# Patient Record
Sex: FEMALE | Race: WHITE | NOT HISPANIC OR LATINO
[De-identification: names, ages, dates, MRNs, and addresses within clinical notes are randomized per-mention and may not be internally consistent; named-entity substitution may affect disease eponyms.]

---

## 2018-02-28 ENCOUNTER — APPOINTMENT (OUTPATIENT)
Dept: CARDIOLOGY | Facility: CLINIC | Age: 53
End: 2018-02-28
Payer: COMMERCIAL

## 2018-02-28 ENCOUNTER — NON-APPOINTMENT (OUTPATIENT)
Age: 53
End: 2018-02-28

## 2018-02-28 VITALS
HEIGHT: 63 IN | OXYGEN SATURATION: 94 % | SYSTOLIC BLOOD PRESSURE: 131 MMHG | HEART RATE: 89 BPM | BODY MASS INDEX: 30.3 KG/M2 | WEIGHT: 171 LBS | DIASTOLIC BLOOD PRESSURE: 85 MMHG

## 2018-02-28 DIAGNOSIS — Z82.49 FAMILY HISTORY OF ISCHEMIC HEART DISEASE AND OTHER DISEASES OF THE CIRCULATORY SYSTEM: ICD-10-CM

## 2018-02-28 DIAGNOSIS — Z87.891 PERSONAL HISTORY OF NICOTINE DEPENDENCE: ICD-10-CM

## 2018-02-28 PROCEDURE — 99205 OFFICE O/P NEW HI 60 MIN: CPT

## 2018-02-28 PROCEDURE — 93000 ELECTROCARDIOGRAM COMPLETE: CPT

## 2018-03-19 ENCOUNTER — APPOINTMENT (OUTPATIENT)
Dept: CARDIOLOGY | Facility: CLINIC | Age: 53
End: 2018-03-19
Payer: COMMERCIAL

## 2018-03-19 PROCEDURE — 93306 TTE W/DOPPLER COMPLETE: CPT

## 2018-05-10 ENCOUNTER — APPOINTMENT (OUTPATIENT)
Dept: CARDIOLOGY | Facility: CLINIC | Age: 53
End: 2018-05-10
Payer: COMMERCIAL

## 2018-05-10 PROCEDURE — 93015 CV STRESS TEST SUPVJ I&R: CPT

## 2019-01-25 ENCOUNTER — APPOINTMENT (OUTPATIENT)
Dept: FAMILY MEDICINE | Facility: CLINIC | Age: 54
End: 2019-01-25
Payer: COMMERCIAL

## 2019-01-25 VITALS
DIASTOLIC BLOOD PRESSURE: 90 MMHG | OXYGEN SATURATION: 97 % | SYSTOLIC BLOOD PRESSURE: 140 MMHG | WEIGHT: 163 LBS | RESPIRATION RATE: 16 BRPM | TEMPERATURE: 97.7 F | HEIGHT: 63 IN | BODY MASS INDEX: 28.88 KG/M2 | HEART RATE: 79 BPM

## 2019-01-25 DIAGNOSIS — R07.89 OTHER CHEST PAIN: ICD-10-CM

## 2019-01-25 DIAGNOSIS — R09.89 OTHER SPECIFIED SYMPTOMS AND SIGNS INVOLVING THE CIRCULATORY AND RESPIRATORY SYSTEMS: ICD-10-CM

## 2019-01-25 DIAGNOSIS — Z80.0 FAMILY HISTORY OF MALIGNANT NEOPLASM OF DIGESTIVE ORGANS: ICD-10-CM

## 2019-01-25 DIAGNOSIS — Z82.3 FAMILY HISTORY OF STROKE: ICD-10-CM

## 2019-01-25 DIAGNOSIS — K21.9 GASTRO-ESOPHAGEAL REFLUX DISEASE W/OUT ESOPHAGITIS: ICD-10-CM

## 2019-01-25 DIAGNOSIS — Z80.3 FAMILY HISTORY OF MALIGNANT NEOPLASM OF BREAST: ICD-10-CM

## 2019-01-25 DIAGNOSIS — Z12.31 ENCOUNTER FOR SCREENING MAMMOGRAM FOR MALIGNANT NEOPLASM OF BREAST: ICD-10-CM

## 2019-01-25 PROCEDURE — 99204 OFFICE O/P NEW MOD 45 MIN: CPT

## 2019-01-25 RX ORDER — ASPIRIN 81 MG
81 TABLET, DELAYED RELEASE (ENTERIC COATED) ORAL
Refills: 0 | Status: DISCONTINUED | COMMUNITY
End: 2019-01-25

## 2019-01-27 PROBLEM — R09.89 OTH SYMPTOMS AND SIGNS INVOLVING THE CIRC AND RESP SYSTEMS: Status: RESOLVED | Noted: 2018-05-14 | Resolved: 2019-01-27

## 2019-01-27 PROBLEM — R07.89 CHEST PRESSURE: Status: RESOLVED | Noted: 2018-02-28 | Resolved: 2019-01-27

## 2019-01-27 PROBLEM — Z12.31 OTHER SCREENING MAMMOGRAM: Status: ACTIVE | Noted: 2019-01-25

## 2019-01-27 PROBLEM — K21.9 ACID REFLUX: Status: ACTIVE | Noted: 2019-01-25

## 2019-01-27 LAB — CYTOLOGY CVX/VAG DOC THIN PREP: NORMAL

## 2019-01-27 NOTE — HISTORY OF PRESENT ILLNESS
[FreeTextEntry1] : establish care [de-identified] : Patient is here today to establish care.  She has multiple issues to discuss and has brought some imaging records with her for review.\par \par About one year ago she had lab testing done which showed she had elevated liver function testing.  She states at the time she had hepatitis screening which was negative. She had an abdominal US that showed fatty liver with lesions likely consistent with hemangiomas and liver enlargement.  She had a CT scan done which showed evidence of cirrhosis and portal hypertension with mild spleen enlargement.  These scans were done in April.  She did see a GI doctor but was not happy there and never followed up.  She made an appointment with a new doctor which is in 2 weeks.  She will be seeing Dr. López.  She knows that this is a real problem and that she has been neglecting it.  She doesn't have any symptoms other than abdominal bloating.  She also admits that she does drink daily and is abusing alcohol.  She admits to drinking about 3 drinks per day of rum and coke, but does admit that she makes these drinks stronger than the average drink so likely more like 4-6 drinks daily.  She plans to stop her drinking.  \par \par She also mentions that she has very high cholesterol on her last labs which she is worried about but she can't take statin medication due to her liver function.  \par \par The third thing she wanted to mention was that she also has a very large lipoma on her upper back which bothers her greatly.  She was seeing a plastic surgeon who was willing to remove it but she never went through with having the presurgical testing done because she had too much going on in her life to do the surgery.  She is interested in getting that removed.

## 2019-01-27 NOTE — REVIEW OF SYSTEMS
[Heartburn] : heartburn [Anxiety] : anxiety [Negative] : Neurological [Abdominal Pain] : no abdominal pain [Nausea] : no nausea [Vomiting] : no vomiting [Suicidal] : not suicidal [FreeTextEntry7] : bloating [de-identified] : stress

## 2019-01-27 NOTE — HEALTH RISK ASSESSMENT
[Fair] :  ~his/her~ mood as fair [No falls in past year] : Patient reported no falls in the past year [0] : 1) Little interest or pleasure doing things: Not at all (0) [2] : 2) Feeling down, depressed, or hopeless for more than half of the days (2) [Patient reported mammogram was normal] : Patient reported mammogram was normal [Patient reported PAP Smear was normal] : Patient reported PAP Smear was normal [Patient reported colonoscopy was normal] : Patient reported colonoscopy was normal [HIV Test offered] : HIV Test offered [Hepatitis C test offered] : Hepatitis C test offered [None] : None [With Family] : lives with family [] :  [Sexually Active] : sexually active [Feels Safe at Home] : Feels safe at home [Fully functional (bathing, dressing, toileting, transferring, walking, feeding)] : Fully functional (bathing, dressing, toileting, transferring, walking, feeding) [Fully functional (using the telephone, shopping, preparing meals, housekeeping, doing laundry, using] : Fully functional and needs no help or supervision to perform IADLs (using the telephone, shopping, preparing meals, housekeeping, doing laundry, using transportation, managing medications and managing finances) [Smoke Detector] : smoke detector [Carbon Monoxide Detector] : carbon monoxide detector [Seat Belt] :  uses seat belt [Patient declined discussion] : Patient declined discussion [] : No [de-identified] : 3-5 drinks of rum daily  [[id number]] : 2 [Change in mental status noted] : No change in mental status noted [Language] : denies difficulty with language [Behavior] : denies difficulty with behavior [Reasoning] : denies difficulty with reasoning [High Risk Behavior] : no high risk behavior [Reports changes in hearing] : Reports no changes in hearing [Reports changes in vision] : Reports no changes in vision [Reports changes in dental health] : Reports no changes in dental health [MammogramDate] : 1/2016 [PapSmearDate] : 1/2016 [ColonoscopyDate] : 1/2015

## 2019-01-27 NOTE — ASSESSMENT
[FreeTextEntry1] : Elevated LFTS/ cirrhosis \par - will repeat labs today\par - imaging studies from April reviewed indicating signs of cirrhosis and portal hypertension\par - patient has an appointment for evaluation with GI in 2 weeks\par - discussed at length regarding alcohol use and need to quit\par - patient plans to wean off alcohol starting today\par - follow up in 2-4 weeks\par \par Alcohol abuse\par - patient drinking daily - 4-6 drinks\par - advised patient that she needs to stop drinking \par - alcohol services offered \par - follow up after GI visit \par \par Hyperlipidemia\par - last year cholesterol total of 300\par - unable to start statins due to liver disease\par - will check labs today\par - consider repatha \par \par Acid reflux\par - work on diet modification\par - needs to stop using alcohol\par - antacids as needed\par \par Lipoma of upper back\par - very large lipoma center of upper back just below neck\par - plans to go back to plastic surgeon\par - advised patient that we need to take care of her liver before pursuing any surgeries\par \par Screening\par - EKG and cardiac testing up to date\par - check labs \par - due for pap\par - script given for mammo\par - due for colonoscopy this year, last one 2015\par

## 2019-01-27 NOTE — PHYSICAL EXAM
[No Acute Distress] : no acute distress [Well Nourished] : well nourished [Well Developed] : well developed [PERRL] : pupils equal round and reactive to light [EOMI] : extraocular movements intact [Normal Outer Ear/Nose] : the outer ears and nose were normal in appearance [Normal Oropharynx] : the oropharynx was normal [Normal TMs] : both tympanic membranes were normal [Supple] : supple [No Lymphadenopathy] : no lymphadenopathy [No Respiratory Distress] : no respiratory distress  [Clear to Auscultation] : lungs were clear to auscultation bilaterally [No Accessory Muscle Use] : no accessory muscle use [Normal Rate] : normal rate  [Regular Rhythm] : with a regular rhythm [Normal S1, S2] : normal S1 and S2 [No Murmur] : no murmur heard [Pedal Pulses Present] : the pedal pulses are present [No Edema] : there was no peripheral edema [Soft] : abdomen soft [Non Tender] : non-tender [Non-distended] : non-distended [Normal Bowel Sounds] : normal bowel sounds [Normal Anterior Cervical Nodes] : no anterior cervical lymphadenopathy [No Spinal Tenderness] : no spinal tenderness [Grossly Normal Strength/Tone] : grossly normal strength/tone [No Rash] : no rash [Normal Gait] : normal gait [No Focal Deficits] : no focal deficits [Normal Affect] : the affect was normal [Alert and Oriented x3] : oriented to person, place, and time [Normal Insight/Judgement] : insight and judgment were intact [de-identified] : mild yellowing of sclera  [de-identified] : enlarged liver

## 2019-01-27 NOTE — COUNSELING
[Healthy eating counseling provided] : healthy eating [Activity counseling provided] : activity [Needs reinforcement, provided] : Patient needs reinforcement on understanding lifestyle changes and  the steps needed to achieve self management goals and reinforcement was provided 13-Nov-2018

## 2019-03-01 ENCOUNTER — LABORATORY RESULT (OUTPATIENT)
Age: 54
End: 2019-03-01

## 2019-03-24 LAB
APPEARANCE: ABNORMAL
BACTERIA UR CULT: ABNORMAL
BILIRUBIN URINE: NEGATIVE
BLOOD URINE: NORMAL
COLOR: YELLOW
GLUCOSE QUALITATIVE U: NEGATIVE
KETONES URINE: NEGATIVE
LEUKOCYTE ESTERASE URINE: ABNORMAL
NITRITE URINE: POSITIVE
PH URINE: 6
PROTEIN URINE: ABNORMAL
SPECIFIC GRAVITY URINE: 1.03
UROBILINOGEN URINE: NORMAL

## 2019-03-26 ENCOUNTER — OUTPATIENT (OUTPATIENT)
Dept: OUTPATIENT SERVICES | Facility: HOSPITAL | Age: 54
LOS: 1 days | Discharge: HOME | End: 2019-03-26
Payer: COMMERCIAL

## 2019-03-26 VITALS
DIASTOLIC BLOOD PRESSURE: 74 MMHG | WEIGHT: 167.99 LBS | RESPIRATION RATE: 16 BRPM | SYSTOLIC BLOOD PRESSURE: 145 MMHG | OXYGEN SATURATION: 99 % | HEART RATE: 92 BPM | TEMPERATURE: 99 F | HEIGHT: 63 IN

## 2019-03-26 DIAGNOSIS — K74.60 UNSPECIFIED CIRRHOSIS OF LIVER: ICD-10-CM

## 2019-03-26 DIAGNOSIS — Z98.890 OTHER SPECIFIED POSTPROCEDURAL STATES: Chronic | ICD-10-CM

## 2019-03-26 DIAGNOSIS — D12.2 BENIGN NEOPLASM OF ASCENDING COLON: ICD-10-CM

## 2019-03-26 DIAGNOSIS — K57.30 DIVERTICULOSIS OF LARGE INTESTINE WITHOUT PERFORATION OR ABSCESS WITHOUT BLEEDING: ICD-10-CM

## 2019-03-26 DIAGNOSIS — K29.50 UNSPECIFIED CHRONIC GASTRITIS WITHOUT BLEEDING: ICD-10-CM

## 2019-03-26 DIAGNOSIS — Z12.11 ENCOUNTER FOR SCREENING FOR MALIGNANT NEOPLASM OF COLON: ICD-10-CM

## 2019-03-26 DIAGNOSIS — K64.8 OTHER HEMORRHOIDS: ICD-10-CM

## 2019-03-26 DIAGNOSIS — K21.9 GASTRO-ESOPHAGEAL REFLUX DISEASE WITHOUT ESOPHAGITIS: ICD-10-CM

## 2019-03-26 DIAGNOSIS — Z01.818 ENCOUNTER FOR OTHER PREPROCEDURAL EXAMINATION: ICD-10-CM

## 2019-03-26 LAB
ALBUMIN SERPL ELPH-MCNC: 3.6 G/DL — SIGNIFICANT CHANGE UP (ref 3.3–5)
ALP SERPL-CCNC: 128 U/L — HIGH (ref 40–120)
ALT FLD-CCNC: 57 U/L — SIGNIFICANT CHANGE UP (ref 12–78)
ANION GAP SERPL CALC-SCNC: 11 MMOL/L — SIGNIFICANT CHANGE UP (ref 5–17)
AST SERPL-CCNC: 73 U/L — HIGH (ref 15–37)
BASOPHILS # BLD AUTO: 0.05 K/UL — SIGNIFICANT CHANGE UP (ref 0–0.2)
BASOPHILS NFR BLD AUTO: 0.6 % — SIGNIFICANT CHANGE UP (ref 0–2)
BILIRUB SERPL-MCNC: 1.8 MG/DL — HIGH (ref 0.2–1.2)
BUN SERPL-MCNC: 7 MG/DL — SIGNIFICANT CHANGE UP (ref 7–23)
CALCIUM SERPL-MCNC: 9 MG/DL — SIGNIFICANT CHANGE UP (ref 8.5–10.1)
CHLORIDE SERPL-SCNC: 101 MMOL/L — SIGNIFICANT CHANGE UP (ref 96–108)
CO2 SERPL-SCNC: 28 MMOL/L — SIGNIFICANT CHANGE UP (ref 22–31)
CREAT SERPL-MCNC: 0.86 MG/DL — SIGNIFICANT CHANGE UP (ref 0.5–1.3)
EOSINOPHIL # BLD AUTO: 0.05 K/UL — SIGNIFICANT CHANGE UP (ref 0–0.5)
EOSINOPHIL NFR BLD AUTO: 0.6 % — SIGNIFICANT CHANGE UP (ref 0–6)
GLUCOSE SERPL-MCNC: 122 MG/DL — HIGH (ref 70–99)
HCT VFR BLD CALC: 38.5 % — SIGNIFICANT CHANGE UP (ref 34.5–45)
HGB BLD-MCNC: 13.3 G/DL — SIGNIFICANT CHANGE UP (ref 11.5–15.5)
IMM GRANULOCYTES NFR BLD AUTO: 0.1 % — SIGNIFICANT CHANGE UP (ref 0–1.5)
LYMPHOCYTES # BLD AUTO: 2.09 K/UL — SIGNIFICANT CHANGE UP (ref 1–3.3)
LYMPHOCYTES # BLD AUTO: 25.3 % — SIGNIFICANT CHANGE UP (ref 13–44)
MCHC RBC-ENTMCNC: 34.5 GM/DL — SIGNIFICANT CHANGE UP (ref 32–36)
MCHC RBC-ENTMCNC: 35.7 PG — HIGH (ref 27–34)
MCV RBC AUTO: 103.2 FL — HIGH (ref 80–100)
MONOCYTES # BLD AUTO: 0.63 K/UL — SIGNIFICANT CHANGE UP (ref 0–0.9)
MONOCYTES NFR BLD AUTO: 7.6 % — SIGNIFICANT CHANGE UP (ref 2–14)
NEUTROPHILS # BLD AUTO: 5.42 K/UL — SIGNIFICANT CHANGE UP (ref 1.8–7.4)
NEUTROPHILS NFR BLD AUTO: 65.8 % — SIGNIFICANT CHANGE UP (ref 43–77)
NRBC # BLD: 0 /100 WBCS — SIGNIFICANT CHANGE UP (ref 0–0)
PLATELET # BLD AUTO: 132 K/UL — LOW (ref 150–400)
POTASSIUM SERPL-MCNC: 3.9 MMOL/L — SIGNIFICANT CHANGE UP (ref 3.5–5.3)
POTASSIUM SERPL-SCNC: 3.9 MMOL/L — SIGNIFICANT CHANGE UP (ref 3.5–5.3)
PROT SERPL-MCNC: 8.4 GM/DL — HIGH (ref 6–8.3)
RBC # BLD: 3.73 M/UL — LOW (ref 3.8–5.2)
RBC # FLD: 12.6 % — SIGNIFICANT CHANGE UP (ref 10.3–14.5)
SODIUM SERPL-SCNC: 140 MMOL/L — SIGNIFICANT CHANGE UP (ref 135–145)
WBC # BLD: 8.25 K/UL — SIGNIFICANT CHANGE UP (ref 3.8–10.5)
WBC # FLD AUTO: 8.25 K/UL — SIGNIFICANT CHANGE UP (ref 3.8–10.5)

## 2019-03-26 PROCEDURE — 93010 ELECTROCARDIOGRAM REPORT: CPT

## 2019-03-26 NOTE — H&P PST ADULT - NSICDXPASTMEDICALHX_GEN_ALL_CORE_FT
PAST MEDICAL HISTORY:  Anxiety     Claustrophobia     Constipation     ETOH abuse     Fatty liver     GERD (gastroesophageal reflux disease)     HLD (hyperlipidemia)     IBS (irritable bowel syndrome)     Liver cirrhosis PAST MEDICAL HISTORY:  Anxiety     Claustrophobia     Constipation     ETOH abuse     Fatty liver     GERD (gastroesophageal reflux disease)     HLD (hyperlipidemia)     IBS (irritable bowel syndrome)     Lipoma     Liver cirrhosis

## 2019-03-26 NOTE — H&P PST ADULT - ASSESSMENT
53 year old female presents to [facility] for upper endoscopy and colonoscopy    1.  Dr López  office  will instruct patient regarding bowel prep and  medication regimen on day of procedure.  2. Endoscopy booking will call patient the day before surgery for arrival instructions

## 2019-03-26 NOTE — H&P PST ADULT - NSICDXFAMILYHX_GEN_ALL_CORE_FT
FAMILY HISTORY:  Family history of Parkinsonism, father  Family history of stroke, father  Family hx of colon cancer, father  FH: CAD (coronary artery disease), father  FHx: breast cancer, mother

## 2019-03-26 NOTE — H&P PST ADULT - HISTORY OF PRESENT ILLNESS
53 year old female with GERD, liver cirrhosis and family history of colon cancer c/o bloating after meals occasional vomiting ; she presents to [facility] for upper endoscopy and routine colonoscopy

## 2019-04-03 RX ORDER — SULFAMETHOXAZOLE AND TRIMETHOPRIM 800; 160 MG/1; MG/1
800-160 TABLET ORAL TWICE DAILY
Qty: 6 | Refills: 0 | Status: COMPLETED | COMMUNITY
Start: 2019-03-11 | End: 2019-04-03

## 2019-04-04 VITALS — HEIGHT: 63 IN | WEIGHT: 169.98 LBS

## 2019-04-05 ENCOUNTER — OUTPATIENT (OUTPATIENT)
Dept: OUTPATIENT SERVICES | Facility: HOSPITAL | Age: 54
LOS: 1 days | Discharge: HOME | End: 2019-04-05
Payer: COMMERCIAL

## 2019-04-05 ENCOUNTER — RESULT REVIEW (OUTPATIENT)
Age: 54
End: 2019-04-05

## 2019-04-05 VITALS
RESPIRATION RATE: 18 BRPM | TEMPERATURE: 99 F | HEIGHT: 63 IN | OXYGEN SATURATION: 97 % | SYSTOLIC BLOOD PRESSURE: 143 MMHG | HEART RATE: 78 BPM | DIASTOLIC BLOOD PRESSURE: 87 MMHG | WEIGHT: 166.89 LBS

## 2019-04-05 DIAGNOSIS — Z98.890 OTHER SPECIFIED POSTPROCEDURAL STATES: Chronic | ICD-10-CM

## 2019-04-05 PROCEDURE — 88312 SPECIAL STAINS GROUP 1: CPT | Mod: 26

## 2019-04-05 PROCEDURE — 88305 TISSUE EXAM BY PATHOLOGIST: CPT | Mod: 26

## 2019-04-05 NOTE — ASU PATIENT PROFILE, ADULT - PMH
Anxiety    Claustrophobia    Constipation    ETOH abuse    Fatty liver    GERD (gastroesophageal reflux disease)    HLD (hyperlipidemia)    IBS (irritable bowel syndrome)    Lipoma    Liver cirrhosis

## 2019-04-09 LAB — SURGICAL PATHOLOGY STUDY: SIGNIFICANT CHANGE UP

## 2019-04-11 DIAGNOSIS — Z80.0 FAMILY HISTORY OF MALIGNANT NEOPLASM OF DIGESTIVE ORGANS: ICD-10-CM

## 2019-04-11 DIAGNOSIS — E78.5 HYPERLIPIDEMIA, UNSPECIFIED: ICD-10-CM

## 2019-04-11 DIAGNOSIS — K57.30 DIVERTICULOSIS OF LARGE INTESTINE WITHOUT PERFORATION OR ABSCESS WITHOUT BLEEDING: ICD-10-CM

## 2019-04-11 DIAGNOSIS — K74.60 UNSPECIFIED CIRRHOSIS OF LIVER: ICD-10-CM

## 2019-04-11 DIAGNOSIS — D12.2 BENIGN NEOPLASM OF ASCENDING COLON: ICD-10-CM

## 2019-04-11 DIAGNOSIS — K64.8 OTHER HEMORRHOIDS: ICD-10-CM

## 2019-04-11 DIAGNOSIS — K58.9 IRRITABLE BOWEL SYNDROME WITHOUT DIARRHEA: ICD-10-CM

## 2019-04-11 DIAGNOSIS — K21.9 GASTRO-ESOPHAGEAL REFLUX DISEASE WITHOUT ESOPHAGITIS: ICD-10-CM

## 2019-04-11 DIAGNOSIS — K29.50 UNSPECIFIED CHRONIC GASTRITIS WITHOUT BLEEDING: ICD-10-CM

## 2019-04-11 DIAGNOSIS — Z12.11 ENCOUNTER FOR SCREENING FOR MALIGNANT NEOPLASM OF COLON: ICD-10-CM

## 2019-08-05 ENCOUNTER — APPOINTMENT (OUTPATIENT)
Dept: FAMILY MEDICINE | Facility: CLINIC | Age: 54
End: 2019-08-05
Payer: COMMERCIAL

## 2019-08-05 VITALS
BODY MASS INDEX: 29.59 KG/M2 | HEART RATE: 94 BPM | SYSTOLIC BLOOD PRESSURE: 140 MMHG | RESPIRATION RATE: 16 BRPM | DIASTOLIC BLOOD PRESSURE: 90 MMHG | OXYGEN SATURATION: 97 % | HEIGHT: 63 IN | WEIGHT: 167 LBS

## 2019-08-05 DIAGNOSIS — N95.0 POSTMENOPAUSAL BLEEDING: ICD-10-CM

## 2019-08-05 DIAGNOSIS — N39.0 URINARY TRACT INFECTION, SITE NOT SPECIFIED: ICD-10-CM

## 2019-08-05 DIAGNOSIS — I10 ESSENTIAL (PRIMARY) HYPERTENSION: ICD-10-CM

## 2019-08-05 PROBLEM — K58.9 IRRITABLE BOWEL SYNDROME WITHOUT DIARRHEA: Chronic | Status: ACTIVE | Noted: 2019-03-26

## 2019-08-05 PROBLEM — K76.0 FATTY (CHANGE OF) LIVER, NOT ELSEWHERE CLASSIFIED: Chronic | Status: ACTIVE | Noted: 2019-03-26

## 2019-08-05 PROBLEM — F10.10 ALCOHOL ABUSE, UNCOMPLICATED: Chronic | Status: ACTIVE | Noted: 2019-03-26

## 2019-08-05 PROBLEM — F40.240 CLAUSTROPHOBIA: Chronic | Status: ACTIVE | Noted: 2019-03-26

## 2019-08-05 PROBLEM — F41.9 ANXIETY DISORDER, UNSPECIFIED: Chronic | Status: ACTIVE | Noted: 2019-03-26

## 2019-08-05 PROBLEM — K59.00 CONSTIPATION, UNSPECIFIED: Chronic | Status: ACTIVE | Noted: 2019-03-26

## 2019-08-05 PROBLEM — D17.9 BENIGN LIPOMATOUS NEOPLASM, UNSPECIFIED: Chronic | Status: ACTIVE | Noted: 2019-03-26

## 2019-08-05 PROBLEM — K21.9 GASTRO-ESOPHAGEAL REFLUX DISEASE WITHOUT ESOPHAGITIS: Chronic | Status: ACTIVE | Noted: 2019-03-26

## 2019-08-05 PROBLEM — K74.60 UNSPECIFIED CIRRHOSIS OF LIVER: Chronic | Status: ACTIVE | Noted: 2019-03-26

## 2019-08-05 PROBLEM — E78.5 HYPERLIPIDEMIA, UNSPECIFIED: Chronic | Status: ACTIVE | Noted: 2019-03-26

## 2019-08-05 PROCEDURE — 99214 OFFICE O/P EST MOD 30 MIN: CPT

## 2019-08-05 RX ORDER — CIPROFLOXACIN HYDROCHLORIDE 500 MG/1
500 TABLET, FILM COATED ORAL
Qty: 3 | Refills: 0 | Status: DISCONTINUED | COMMUNITY
Start: 2019-04-03 | End: 2019-08-05

## 2019-08-05 RX ORDER — NITROFURANTOIN (MONOHYDRATE/MACROCRYSTALS) 25; 75 MG/1; MG/1
100 CAPSULE ORAL
Qty: 14 | Refills: 0 | Status: COMPLETED | COMMUNITY
Start: 2019-05-06

## 2019-08-05 RX ORDER — ERGOCALCIFEROL 1.25 MG/1
1.25 MG CAPSULE, LIQUID FILLED ORAL
Qty: 12 | Refills: 1 | Status: COMPLETED | COMMUNITY
Start: 2019-03-01 | End: 2019-08-05

## 2019-08-05 RX ORDER — ATORVASTATIN CALCIUM 20 MG/1
20 TABLET, FILM COATED ORAL
Qty: 90 | Refills: 0 | Status: COMPLETED | COMMUNITY
Start: 2019-03-01 | End: 2019-08-05

## 2019-08-05 RX ORDER — SODIUM SULFATE, POTASSIUM SULFATE, MAGNESIUM SULFATE 17.5; 3.13; 1.6 G/ML; G/ML; G/ML
17.5-3.13-1.6 SOLUTION, CONCENTRATE ORAL
Qty: 354 | Refills: 0 | Status: COMPLETED | COMMUNITY
Start: 2019-02-14

## 2019-08-05 RX ORDER — ALPRAZOLAM 0.25 MG/1
0.25 TABLET ORAL
Qty: 15 | Refills: 0 | Status: COMPLETED | COMMUNITY
Start: 2019-02-28

## 2019-08-05 RX ORDER — POLYETHYLENE GLYCOL-3350, SODIUM CHLORIDE, POTASSIUM CHLORIDE AND SODIUM BICARBONATE 420; 11.2; 5.72; 1.48 G/438.4G; G/438.4G; G/438.4G; G/438.4G
420 POWDER, FOR SOLUTION ORAL
Qty: 4000 | Refills: 0 | Status: COMPLETED | COMMUNITY
Start: 2019-02-22 | End: 2019-08-05

## 2019-08-05 RX ORDER — SODIUM FLUORIDE 0.1 MG/ML
RINSE ORAL
Refills: 0 | Status: ACTIVE | COMMUNITY

## 2019-08-05 RX ORDER — ALPRAZOLAM 0.5 MG/1
0.5 TABLET ORAL
Qty: 1 | Refills: 0 | Status: COMPLETED | COMMUNITY
Start: 2019-02-18

## 2019-08-05 NOTE — REVIEW OF SYSTEMS
[Abdominal Pain] : abdominal pain [Negative] : Integumentary [FreeTextEntry7] : pain around belly button, hardened area/ protrusion [FreeTextEntry8] : vaginal bleeding

## 2019-08-05 NOTE — ASSESSMENT
[FreeTextEntry1] : Periumbilical pain\par - check abdominal US\par - rule out hernia\par - follow up with GI\par \par Postmenopausal bleeding\par - follow up with GYN\par - has script for US and has not gone\par - advised she should go right away for transvaginal US\par - will likely need endometrial biopsy\par \par Hypertension\par - BP elevated\par - start HCTZ\par - follow up in 3 months\par \par Hyperlipidemia\par - stopped taking statin\par - will restart on simvastatin as patient said she did not feel well when taking atorvastatin\par - check labs in 3 months\par \par

## 2019-08-05 NOTE — HISTORY OF PRESENT ILLNESS
[FreeTextEntry8] : Patient is here today for an acute visit. \par She has been having pain in her upper abdomen, intermittently for a long time. \par She has been having nausea and bloating. \par She feels like there is a protrusion around her belly button that started the weekend. \par When she was touching the area if felt hard underneath. \par She is not sure if it might be a hernia.  \par \par She also mentions that she has been having some vaginal bleeding over the past month. \par She has not talked to her GYN regarding this situation yet.  \par She did have a script for an internal sonogram but has not gone yet.

## 2019-08-05 NOTE — PHYSICAL EXAM
[No Acute Distress] : no acute distress [Well Nourished] : well nourished [Well Developed] : well developed [PERRL] : pupils equal round and reactive to light [Normal Sclera/Conjunctiva] : normal sclera/conjunctiva [Normal Outer Ear/Nose] : the outer ears and nose were normal in appearance [Normal Oropharynx] : the oropharynx was normal [Normal TMs] : both tympanic membranes were normal [No Lymphadenopathy] : no lymphadenopathy [Supple] : supple [No Respiratory Distress] : no respiratory distress  [No Accessory Muscle Use] : no accessory muscle use [Clear to Auscultation] : lungs were clear to auscultation bilaterally [Normal Rate] : normal rate  [Regular Rhythm] : with a regular rhythm [Normal S1, S2] : normal S1 and S2 [No Edema] : there was no peripheral edema [Non-distended] : non-distended [Soft] : abdomen soft [Normal Bowel Sounds] : normal bowel sounds [Normal Posterior Cervical Nodes] : no posterior cervical lymphadenopathy [Normal Anterior Cervical Nodes] : no anterior cervical lymphadenopathy [No Spinal Tenderness] : no spinal tenderness [Grossly Normal Strength/Tone] : grossly normal strength/tone [No Rash] : no rash [No Focal Deficits] : no focal deficits [Normal Gait] : normal gait [Normal Affect] : the affect was normal [Normal Insight/Judgement] : insight and judgment were intact [de-identified] : hardened area around belly button, pain with palpation

## 2019-08-12 ENCOUNTER — CLINICAL ADVICE (OUTPATIENT)
Age: 54
End: 2019-08-12

## 2019-10-07 ENCOUNTER — APPOINTMENT (OUTPATIENT)
Dept: SURGERY | Facility: CLINIC | Age: 54
End: 2019-10-07
Payer: COMMERCIAL

## 2019-10-07 VITALS
DIASTOLIC BLOOD PRESSURE: 76 MMHG | WEIGHT: 169 LBS | BODY MASS INDEX: 29.95 KG/M2 | TEMPERATURE: 98.3 F | OXYGEN SATURATION: 95 % | SYSTOLIC BLOOD PRESSURE: 116 MMHG | HEIGHT: 63 IN | HEART RATE: 99 BPM

## 2019-10-07 DIAGNOSIS — E65 LOCALIZED ADIPOSITY: ICD-10-CM

## 2019-10-07 DIAGNOSIS — L72.0 EPIDERMAL CYST: ICD-10-CM

## 2019-10-07 PROCEDURE — 99204 OFFICE O/P NEW MOD 45 MIN: CPT

## 2019-10-07 NOTE — PHYSICAL EXAM
[JVD] : no jugular venous distention  [No Rash or Lesion] : No rash or lesion [Purpura] : no purpura  [Petechiae] : no petechiae [Skin Ulcer] : no ulcer [Skin Induration] : no induration [Alert] : alert [Oriented to Person] : oriented to person [Oriented to Place] : oriented to place [Oriented to Time] : oriented to time [Anxious] : anxious [de-identified] : non toxic, in no acute distress  [de-identified] : NC/AT PERRL EOMI no scleral icterus  [de-identified] : trachea midline, no gross mass  [de-identified] : no audible wheezing or stridor  [de-identified] : obese soft, non tender, no guarding, no rebound, no masses  [de-identified] : FROM of all extremities with no gross deformity or angulation, there is no abdominal wall hernias  [de-identified] : there is a large buffalo hump of the upper back, no clear lipoma is palpated, there is  a small cyst palpable above the umbilicus as seen on ultrasound [de-identified] : mood is mildly anxious

## 2019-10-07 NOTE — ASSESSMENT
[FreeTextEntry1] : The patient is a 54 year old female with a history of alcoholic cirrhosis who comes with a buffalo hump of the upper back and a cyst of the anterior abdominal wall. She has been advised that there is no clear discernible lipoma of the upper back, it feels more compatible with lipodystrophy of the upper back.  I have recommended she seek consultation with a plastic surgeon for possible liposuction as this may afford her a better result with a least invasive approach.  Regarding the anterior abdominal wall cyst we will take a conservative approach. She will follow up as needed from this point forward.

## 2019-10-07 NOTE — DATA REVIEWED
[FreeTextEntry1] : Independent review of the ultrasound reveals a 7 mm fluid filled cyst in the periumbilical area.  There is cirrhotic change to the liver parenchyma

## 2019-10-07 NOTE — CONSULT LETTER
[Dear  ___] : Dear  [unfilled], [Consult Letter:] : I had the pleasure of evaluating your patient, [unfilled]. [( Thank you for referring [unfilled] for consultation for _____ )] : Thank you for referring [unfilled] for consultation for [unfilled] [Please see my note below.] : Please see my note below. [Consult Closing:] : Thank you very much for allowing me to participate in the care of this patient.  If you have any questions, please do not hesitate to contact me. [Sincerely,] : Sincerely, [FreeTextEntry3] : Chalino Ellis MD, FACS\par  of Surgery\par [facility]\par

## 2019-10-07 NOTE — HISTORY OF PRESENT ILLNESS
[de-identified] : The patient comes to the office in consultation by Dr. Yamilet Sutton for evaluation of large lump of the upper back and a smaller cyst of the abdominal wall.  The patient is quite distressed by the presence of a large fatty lump of the upper back and lower neck.  She reports that she has been experiencing upper back pain that she attributes to the presence of the mass. She states has seen a surgeon in the past that recommended liposuction, but she declined at that time.  The patient also reports that she has a smaller lump near her belly button that on imaging is consistent with a cyst.

## 2019-11-05 ENCOUNTER — APPOINTMENT (OUTPATIENT)
Dept: PLASTIC SURGERY | Facility: CLINIC | Age: 54
End: 2019-11-05

## 2019-11-06 ENCOUNTER — RX RENEWAL (OUTPATIENT)
Age: 54
End: 2019-11-06

## 2019-11-20 ENCOUNTER — MEDICATION RENEWAL (OUTPATIENT)
Age: 54
End: 2019-11-20

## 2019-11-26 ENCOUNTER — APPOINTMENT (OUTPATIENT)
Dept: FAMILY MEDICINE | Facility: CLINIC | Age: 54
End: 2019-11-26
Payer: COMMERCIAL

## 2019-11-26 VITALS
WEIGHT: 172 LBS | DIASTOLIC BLOOD PRESSURE: 70 MMHG | HEIGHT: 63 IN | RESPIRATION RATE: 16 BRPM | HEART RATE: 90 BPM | BODY MASS INDEX: 30.48 KG/M2 | SYSTOLIC BLOOD PRESSURE: 140 MMHG | OXYGEN SATURATION: 97 %

## 2019-11-26 LAB — CYTOLOGY CVX/VAG DOC THIN PREP: NORMAL

## 2019-11-26 PROCEDURE — 99214 OFFICE O/P EST MOD 30 MIN: CPT | Mod: 25

## 2019-11-26 PROCEDURE — 36415 COLL VENOUS BLD VENIPUNCTURE: CPT

## 2019-11-27 LAB — POTASSIUM SERPL-SCNC: 5.1 MMOL/L

## 2019-11-27 RX ORDER — POTASSIUM CHLORIDE 600 MG/1
8 CAPSULE, EXTENDED RELEASE ORAL
Qty: 90 | Refills: 0 | Status: COMPLETED | COMMUNITY
Start: 2019-11-20 | End: 2019-11-27

## 2019-11-27 NOTE — ASSESSMENT
[As per surgery] : as per surgery [Patient Optimized for Surgery] : Patient optimized for surgery [FreeTextEntry4] : Patient is here today for clearance for lipoma removal.\par Presurgical labs - LFTS stable, patient has cirrhosis, Critically low potassium - taking supplement and repeat shows normal at 5.1\par EKG - sinus rhythm, no acute changes from previous, had recent stress and ECHO in 2018 and cleared by cardiology\par No contraindication to planned procedure

## 2019-11-27 NOTE — PHYSICAL EXAM
[Well Nourished] : well nourished [Well Developed] : well developed [No Acute Distress] : no acute distress [PERRL] : pupils equal round and reactive to light [Normal Oropharynx] : the oropharynx was normal [Normal Sclera/Conjunctiva] : normal sclera/conjunctiva [No Lymphadenopathy] : no lymphadenopathy [Supple] : supple [Normal TMs] : both tympanic membranes were normal [Clear to Auscultation] : lungs were clear to auscultation bilaterally [No Accessory Muscle Use] : no accessory muscle use [No Respiratory Distress] : no respiratory distress  [Normal Rate] : normal rate  [Regular Rhythm] : with a regular rhythm [Normal S1, S2] : normal S1 and S2 [No Edema] : there was no peripheral edema [Soft] : abdomen soft [Non Tender] : non-tender [Normal Bowel Sounds] : normal bowel sounds [Non-distended] : non-distended [Normal Anterior Cervical Nodes] : no anterior cervical lymphadenopathy [Normal Posterior Cervical Nodes] : no posterior cervical lymphadenopathy [Grossly Normal Strength/Tone] : grossly normal strength/tone [No Spinal Tenderness] : no spinal tenderness [No Rash] : no rash [No Focal Deficits] : no focal deficits [Normal Gait] : normal gait [Normal Insight/Judgement] : insight and judgment were intact [Normal Affect] : the affect was normal [de-identified] : large lipoma

## 2019-11-27 NOTE — PLAN
[FreeTextEntry1] : Hypokalemia\par - took potassium supplement\par - repeat labs - \par \par Cirrhosis\par - LFTs stable\par - following with GI\par - has follow up in December\par \par Anxiety\par - abut procedure\par - small amount of xanax prescribed to use as needed prior to and after procedure

## 2019-11-27 NOTE — HISTORY OF PRESENT ILLNESS
[No Pertinent Cardiac History] : no history of aortic stenosis, atrial fibrillation, coronary artery disease, recent myocardial infarction, or implantable device/pacemaker [No Adverse Anesthesia Reaction] : no adverse anesthesia reaction in self or family member [Good (7-10 METs)] : Good (7-10 METs) [(Patient denies any chest pain, claudication, dyspnea on exertion, orthopnea, palpitations or syncope)] : Patient denies any chest pain, claudication, dyspnea on exertion, orthopnea, palpitations or syncope [Aortic Stenosis] : no aortic stenosis [Atrial Fibrillation] : no atrial fibrillation [Coronary Artery Disease] : no coronary artery disease [Recent Myocardial Infarction] : no recent myocardial infarction [Implantable Device/Pacemaker] : no implantable device/pacemaker [Asthma] : no asthma [COPD] : no COPD [Sleep Apnea] : no sleep apnea [Smoker] : not a smoker [Chronic Anticoagulation] : no chronic anticoagulation [Chronic Kidney Disease] : no chronic kidney disease [Diabetes] : no diabetes [FreeTextEntry1] : lipoma removal  [FreeTextEntry2] : 12/4/2019 [FreeTextEntry3] : Dr. Belkys Buenrostro [FreeTextEntry4] : She will be having lipoma removal at [facility]

## 2019-12-16 ENCOUNTER — RX RENEWAL (OUTPATIENT)
Age: 54
End: 2019-12-16

## 2019-12-19 ENCOUNTER — APPOINTMENT (OUTPATIENT)
Dept: FAMILY MEDICINE | Facility: CLINIC | Age: 54
End: 2019-12-19
Payer: COMMERCIAL

## 2019-12-19 VITALS
RESPIRATION RATE: 15 BRPM | WEIGHT: 162 LBS | BODY MASS INDEX: 28.7 KG/M2 | HEART RATE: 92 BPM | DIASTOLIC BLOOD PRESSURE: 76 MMHG | HEIGHT: 63 IN | OXYGEN SATURATION: 98 % | SYSTOLIC BLOOD PRESSURE: 120 MMHG

## 2019-12-19 LAB
FLUAV SPEC QL CULT: NEGATIVE
FLUBV AG SPEC QL IA: NEGATIVE

## 2019-12-19 PROCEDURE — 99214 OFFICE O/P EST MOD 30 MIN: CPT | Mod: 25

## 2019-12-19 PROCEDURE — 87804 INFLUENZA ASSAY W/OPTIC: CPT | Mod: QW

## 2019-12-19 NOTE — PHYSICAL EXAM
[No Acute Distress] : no acute distress [Well Nourished] : well nourished [Well Developed] : well developed [Normal Sclera/Conjunctiva] : normal sclera/conjunctiva [PERRL] : pupils equal round and reactive to light [Normal Outer Ear/Nose] : the outer ears and nose were normal in appearance [Normal Oropharynx] : the oropharynx was normal [Normal TMs] : both tympanic membranes were normal [No Lymphadenopathy] : no lymphadenopathy [No Respiratory Distress] : no respiratory distress  [Clear to Auscultation] : lungs were clear to auscultation bilaterally [No Accessory Muscle Use] : no accessory muscle use [Normal Rate] : normal rate  [Normal S1, S2] : normal S1 and S2 [Regular Rhythm] : with a regular rhythm [Soft] : abdomen soft [Non-distended] : non-distended [Normal Bowel Sounds] : normal bowel sounds [Grossly Normal Strength/Tone] : grossly normal strength/tone [No Rash] : no rash [Normal Gait] : normal gait [No Focal Deficits] : no focal deficits [Normal Insight/Judgement] : insight and judgment were intact [Normal Affect] : the affect was normal [de-identified] : diffuse tenderness

## 2019-12-19 NOTE — HISTORY OF PRESENT ILLNESS
[FreeTextEntry8] : Patient is here today for an acute visit. \par She has been feeling unwell for the past 4-5 days. \par She has shakes, muscle pain. \par She has no appetite. \par She has nausea and diarrhea.  She notices her stool is very dark color, floating.  She has diarrhea constantly and today is the first day that she is starting to turn the corner. \par She tried immodium once but did not take it again. \par She does not have fevers. \par She can't sleep and she has palpitations. \par \par

## 2019-12-19 NOTE — ASSESSMENT
[FreeTextEntry1] : Viral illness\par Nausea\par Diarrhea\par - flu negative\par - start zofran for nausea\par - increase fluids\par - BRAT diet\par - call GI regarding dark stools - has follow up 1/6\par - if not feeling better in next 3-5 days call office, consider stool studies\par - AVOID ALCOHOL\par \par Anxiety\par - continue with xanax as needed\par - will consider starting maintenance medication once viral illness resolved

## 2019-12-19 NOTE — REVIEW OF SYSTEMS
[Abdominal Pain] : abdominal pain [Nausea] : nausea [Diarrhea] : diarrhea [Melena] : mellolita [Muscle Pain] : muscle pain [Negative] : Integumentary [Anxiety] : anxiety

## 2019-12-23 ENCOUNTER — RX RENEWAL (OUTPATIENT)
Age: 54
End: 2019-12-23

## 2020-01-01 ENCOUNTER — OUTPATIENT (OUTPATIENT)
Dept: OUTPATIENT SERVICES | Facility: HOSPITAL | Age: 55
LOS: 1 days | End: 2020-01-01
Payer: COMMERCIAL

## 2020-01-01 ENCOUNTER — APPOINTMENT (OUTPATIENT)
Dept: FAMILY MEDICINE | Facility: CLINIC | Age: 55
End: 2020-01-01
Payer: COMMERCIAL

## 2020-01-01 ENCOUNTER — RX RENEWAL (OUTPATIENT)
Age: 55
End: 2020-01-01

## 2020-01-01 ENCOUNTER — RESULT REVIEW (OUTPATIENT)
Age: 55
End: 2020-01-01

## 2020-01-01 ENCOUNTER — OUTPATIENT (OUTPATIENT)
Dept: INPATIENT UNIT | Facility: HOSPITAL | Age: 55
LOS: 1 days | Discharge: HOME | End: 2020-01-01
Payer: COMMERCIAL

## 2020-01-01 VITALS
SYSTOLIC BLOOD PRESSURE: 126 MMHG | BODY MASS INDEX: 31.01 KG/M2 | WEIGHT: 175 LBS | DIASTOLIC BLOOD PRESSURE: 72 MMHG | TEMPERATURE: 97.4 F | HEIGHT: 63 IN | RESPIRATION RATE: 16 BRPM | HEART RATE: 79 BPM | OXYGEN SATURATION: 97 %

## 2020-01-01 VITALS
RESPIRATION RATE: 18 BRPM | SYSTOLIC BLOOD PRESSURE: 114 MMHG | DIASTOLIC BLOOD PRESSURE: 57 MMHG | OXYGEN SATURATION: 99 % | HEART RATE: 79 BPM | TEMPERATURE: 98 F

## 2020-01-01 VITALS
WEIGHT: 169.09 LBS | OXYGEN SATURATION: 100 % | RESPIRATION RATE: 18 BRPM | HEART RATE: 72 BPM | SYSTOLIC BLOOD PRESSURE: 117 MMHG | DIASTOLIC BLOOD PRESSURE: 62 MMHG | HEIGHT: 63 IN | TEMPERATURE: 98 F

## 2020-01-01 DIAGNOSIS — I10 ESSENTIAL (PRIMARY) HYPERTENSION: ICD-10-CM

## 2020-01-01 DIAGNOSIS — K70.31 ALCOHOLIC CIRRHOSIS OF LIVER WITH ASCITES: ICD-10-CM

## 2020-01-01 DIAGNOSIS — F10.10 ALCOHOL ABUSE, UNCOMPLICATED: ICD-10-CM

## 2020-01-01 DIAGNOSIS — D17.1 BENIGN LIPOMATOUS NEOPLASM OF SKIN AND SUBCUTANEOUS TISSUE OF TRUNK: Chronic | ICD-10-CM

## 2020-01-01 DIAGNOSIS — Z11.59 ENCOUNTER FOR SCREENING FOR OTHER VIRAL DISEASES: ICD-10-CM

## 2020-01-01 DIAGNOSIS — F10.21 ALCOHOL DEPENDENCE, IN REMISSION: ICD-10-CM

## 2020-01-01 DIAGNOSIS — Z98.890 OTHER SPECIFIED POSTPROCEDURAL STATES: Chronic | ICD-10-CM

## 2020-01-01 DIAGNOSIS — K21.9 GASTRO-ESOPHAGEAL REFLUX DISEASE WITHOUT ESOPHAGITIS: ICD-10-CM

## 2020-01-01 DIAGNOSIS — F41.9 ANXIETY DISORDER, UNSPECIFIED: ICD-10-CM

## 2020-01-01 DIAGNOSIS — D64.9 ANEMIA, UNSPECIFIED: ICD-10-CM

## 2020-01-01 DIAGNOSIS — E78.5 HYPERLIPIDEMIA, UNSPECIFIED: ICD-10-CM

## 2020-01-01 DIAGNOSIS — Z79.1 LONG TERM (CURRENT) USE OF NON-STEROIDAL ANTI-INFLAMMATORIES (NSAID): ICD-10-CM

## 2020-01-01 LAB
ALBUMIN FLD-MCNC: 1.1 G/DL — SIGNIFICANT CHANGE UP
B PERT IGG+IGM PNL SER: ABNORMAL
COLOR FLD: YELLOW — SIGNIFICANT CHANGE UP
CULTURE RESULTS: SIGNIFICANT CHANGE UP
CULTURE RESULTS: SIGNIFICANT CHANGE UP
EOSINOPHIL # FLD: 0 % — SIGNIFICANT CHANGE UP
FLUID INTAKE SUBSTANCE CLASS: SIGNIFICANT CHANGE UP
FLUID SEGMENTED GRANULOCYTES: 3 % — SIGNIFICANT CHANGE UP
FOLATE+VIT B12 SERBLD-IMP: 0 % — SIGNIFICANT CHANGE UP
GLUCOSE FLD-MCNC: 117 MG/DL — SIGNIFICANT CHANGE UP
GRAM STN FLD: SIGNIFICANT CHANGE UP
LDH SERPL L TO P-CCNC: 54 U/L — SIGNIFICANT CHANGE UP
LYMPHOCYTES # FLD: 11 % — SIGNIFICANT CHANGE UP
MESOTHL CELL # FLD: 4 % — SIGNIFICANT CHANGE UP
MONOS+MACROS # FLD: 82 % — SIGNIFICANT CHANGE UP
NRBC # FLD: 0 — SIGNIFICANT CHANGE UP
OTHER CELLS FLD MANUAL: 0 % — SIGNIFICANT CHANGE UP
PROT FLD-MCNC: 2.5 G/DL — SIGNIFICANT CHANGE UP
RCV VOL RI: 1000 /UL — HIGH (ref 0–0)
SARS-COV-2 RNA SPEC QL NAA+PROBE: SIGNIFICANT CHANGE UP
SPECIMEN SOURCE: SIGNIFICANT CHANGE UP
TOTAL NUCLEATED CELL COUNT, BODY FLUID: 319 /UL — SIGNIFICANT CHANGE UP
TUBE TYPE: SIGNIFICANT CHANGE UP

## 2020-01-01 PROCEDURE — 49083 ABD PARACENTESIS W/IMAGING: CPT

## 2020-01-01 PROCEDURE — 83615 LACTATE (LD) (LDH) ENZYME: CPT

## 2020-01-01 PROCEDURE — 84157 ASSAY OF PROTEIN OTHER: CPT

## 2020-01-01 PROCEDURE — 99072 ADDL SUPL MATRL&STAF TM PHE: CPT

## 2020-01-01 PROCEDURE — 88108 CYTOPATH CONCENTRATE TECH: CPT | Mod: 26

## 2020-01-01 PROCEDURE — 87102 FUNGUS ISOLATION CULTURE: CPT

## 2020-01-01 PROCEDURE — U0003: CPT

## 2020-01-01 PROCEDURE — 88108 CYTOPATH CONCENTRATE TECH: CPT

## 2020-01-01 PROCEDURE — 87075 CULTR BACTERIA EXCEPT BLOOD: CPT

## 2020-01-01 PROCEDURE — 99214 OFFICE O/P EST MOD 30 MIN: CPT | Mod: 25

## 2020-01-01 PROCEDURE — 82042 OTHER SOURCE ALBUMIN QUAN EA: CPT

## 2020-01-01 PROCEDURE — 89051 BODY FLUID CELL COUNT: CPT

## 2020-01-01 PROCEDURE — 82945 GLUCOSE OTHER FLUID: CPT

## 2020-01-01 PROCEDURE — 87070 CULTURE OTHR SPECIMN AEROBIC: CPT

## 2020-01-01 RX ORDER — ALPRAZOLAM 0.25 MG
0 TABLET ORAL
Qty: 0 | Refills: 0 | DISCHARGE

## 2020-01-01 RX ORDER — SIMVASTATIN 10 MG/1
10 TABLET, FILM COATED ORAL
Qty: 90 | Refills: 0 | Status: DISCONTINUED | COMMUNITY
Start: 2019-08-05 | End: 2020-01-01

## 2020-01-03 ENCOUNTER — APPOINTMENT (OUTPATIENT)
Dept: FAMILY MEDICINE | Facility: CLINIC | Age: 55
End: 2020-01-03
Payer: COMMERCIAL

## 2020-01-03 VITALS
SYSTOLIC BLOOD PRESSURE: 126 MMHG | HEART RATE: 93 BPM | WEIGHT: 162 LBS | BODY MASS INDEX: 28.7 KG/M2 | HEIGHT: 63 IN | DIASTOLIC BLOOD PRESSURE: 84 MMHG | OXYGEN SATURATION: 98 % | RESPIRATION RATE: 15 BRPM | TEMPERATURE: 98.2 F

## 2020-01-03 PROCEDURE — 99214 OFFICE O/P EST MOD 30 MIN: CPT

## 2020-01-03 NOTE — REVIEW OF SYSTEMS
[Hoarseness] : hoarseness [Nasal Discharge] : nasal discharge [Sore Throat] : sore throat [Cough] : cough [Headache] : headache [Negative] : Integumentary

## 2020-01-03 NOTE — HISTORY OF PRESENT ILLNESS
[FreeTextEntry8] : Patient is here today for an acute visit.\par She started feeling unwell yesterday. \par She developed a headache and cough. \par She has nasal congestion, yellow mucus. \par She lost her voice. \par She also had an elevated temperature yesterday 99.8.  \par She took advil yesterday which helped. \par She used afrin nasal spray today which helped.

## 2020-01-03 NOTE — PHYSICAL EXAM
[No Acute Distress] : no acute distress [Well Nourished] : well nourished [Well Developed] : well developed [Normal Sclera/Conjunctiva] : normal sclera/conjunctiva [PERRL] : pupils equal round and reactive to light [Normal Outer Ear/Nose] : the outer ears and nose were normal in appearance [No Lymphadenopathy] : no lymphadenopathy [Normal TMs] : both tympanic membranes were normal [No Accessory Muscle Use] : no accessory muscle use [No Respiratory Distress] : no respiratory distress  [Supple] : supple [Regular Rhythm] : with a regular rhythm [Normal Rate] : normal rate  [No Edema] : there was no peripheral edema [Normal S1, S2] : normal S1 and S2 [Non-distended] : non-distended [Non Tender] : non-tender [Soft] : abdomen soft [Grossly Normal Strength/Tone] : grossly normal strength/tone [Normal Bowel Sounds] : normal bowel sounds [No Rash] : no rash [Normal Affect] : the affect was normal [Normal Gait] : normal gait [No Focal Deficits] : no focal deficits [de-identified] : wheeze bilateral [Normal Insight/Judgement] : insight and judgment were intact [de-identified] : erythema of pharynx, hoarse voice

## 2020-01-03 NOTE — ASSESSMENT
[FreeTextEntry1] : Laryngitis\par Bronchitis\par - recommend OTC treatment\par - gargle with warm salt water\par - cough suppressant/ mucinex\par - start medrol pack \par - if not improving start z pack

## 2020-01-06 ENCOUNTER — RX RENEWAL (OUTPATIENT)
Age: 55
End: 2020-01-06

## 2020-01-06 ENCOUNTER — RX CHANGE (OUTPATIENT)
Age: 55
End: 2020-01-06

## 2020-01-28 ENCOUNTER — RX RENEWAL (OUTPATIENT)
Age: 55
End: 2020-01-28

## 2020-02-07 ENCOUNTER — APPOINTMENT (OUTPATIENT)
Dept: FAMILY MEDICINE | Facility: CLINIC | Age: 55
End: 2020-02-07
Payer: COMMERCIAL

## 2020-02-07 VITALS
HEART RATE: 75 BPM | RESPIRATION RATE: 15 BRPM | DIASTOLIC BLOOD PRESSURE: 86 MMHG | OXYGEN SATURATION: 98 % | WEIGHT: 162 LBS | HEIGHT: 63 IN | BODY MASS INDEX: 28.7 KG/M2 | SYSTOLIC BLOOD PRESSURE: 140 MMHG

## 2020-02-07 DIAGNOSIS — R10.33 PERIUMBILICAL PAIN: ICD-10-CM

## 2020-02-07 DIAGNOSIS — Z87.898 PERSONAL HISTORY OF OTHER SPECIFIED CONDITIONS: ICD-10-CM

## 2020-02-07 DIAGNOSIS — Z87.09 PERSONAL HISTORY OF OTHER DISEASES OF THE RESPIRATORY SYSTEM: ICD-10-CM

## 2020-02-07 DIAGNOSIS — R82.90 UNSPECIFIED ABNORMAL FINDINGS IN URINE: ICD-10-CM

## 2020-02-07 DIAGNOSIS — Z86.39 PERSONAL HISTORY OF OTHER ENDOCRINE, NUTRITIONAL AND METABOLIC DISEASE: ICD-10-CM

## 2020-02-07 DIAGNOSIS — Z86.19 PERSONAL HISTORY OF OTHER INFECTIOUS AND PARASITIC DISEASES: ICD-10-CM

## 2020-02-07 DIAGNOSIS — M25.512 PAIN IN LEFT SHOULDER: ICD-10-CM

## 2020-02-07 PROCEDURE — 99214 OFFICE O/P EST MOD 30 MIN: CPT

## 2020-02-07 RX ORDER — ONDANSETRON 4 MG/1
4 TABLET, ORALLY DISINTEGRATING ORAL EVERY 6 HOURS
Qty: 28 | Refills: 0 | Status: COMPLETED | COMMUNITY
Start: 2019-12-19 | End: 2020-02-07

## 2020-02-07 RX ORDER — AZITHROMYCIN 250 MG/1
250 TABLET, FILM COATED ORAL
Qty: 1 | Refills: 0 | Status: COMPLETED | COMMUNITY
Start: 2020-01-03 | End: 2020-02-07

## 2020-02-07 RX ORDER — METHYLPREDNISOLONE 4 MG/1
4 TABLET ORAL
Qty: 1 | Refills: 0 | Status: COMPLETED | COMMUNITY
Start: 2020-01-03 | End: 2020-02-07

## 2020-02-07 NOTE — HISTORY OF PRESENT ILLNESS
[FreeTextEntry8] : Patient is here today for an acute visit. \par About one month ago she slipped down the stairs and she banged her knees on the stairs. \par She put her left arm out to stop her fall. \par She feels pain in her left neck and shoulder. \par She has difficulty moving her arm due to pain. \par She is limited in all range of motion directions. \par She gets shooting pain down her arm. \par She has trouble sleeping because she usually sleeps on the left side. \par \par She also has been having consistent anxiety. \par She was using the xanax as needed but finds she needs to take two tablets for it to be effective. \par \par \par

## 2020-02-07 NOTE — REVIEW OF SYSTEMS
[Muscle Pain] : muscle pain [Joint Pain] : joint pain [Anxiety] : anxiety [Negative] : Gastrointestinal [FreeTextEntry9] : neck and left shoulder pain radiating down arm

## 2020-02-07 NOTE — ASSESSMENT
[FreeTextEntry1] : Neck pain\par Left shoulder pain\par - s/p fall on stairs\par - s/p large lipoma removal in area\par - heating pad and meloxicam for inflammation\par - check cervical and shoulder xray\par \par Anxiety\par - will increase dose of xanax to 0.5mg daily as needed \par - will start on maintenance medication - start lexapro 10mg\par - can increase to 20mg after two weeks if not feeling less anxious

## 2020-02-07 NOTE — PHYSICAL EXAM
[No Acute Distress] : no acute distress [Well Nourished] : well nourished [Well Developed] : well developed [Normal Sclera/Conjunctiva] : normal sclera/conjunctiva [PERRL] : pupils equal round and reactive to light [EOMI] : extraocular movements intact [Normal Oropharynx] : the oropharynx was normal [No Lymphadenopathy] : no lymphadenopathy [Normal TMs] : both tympanic membranes were normal [Supple] : supple [No Respiratory Distress] : no respiratory distress  [No Accessory Muscle Use] : no accessory muscle use [Clear to Auscultation] : lungs were clear to auscultation bilaterally [Normal Rate] : normal rate  [Normal S1, S2] : normal S1 and S2 [Regular Rhythm] : with a regular rhythm [Soft] : abdomen soft [Non Tender] : non-tender [Normal Bowel Sounds] : normal bowel sounds [No Rash] : no rash [Non-distended] : non-distended [Normal Affect] : the affect was normal [No Focal Deficits] : no focal deficits [Normal Gait] : normal gait [de-identified] : hypertonic neck and trap muscles, decreased ROM in all planes for left shoulder [Normal Insight/Judgement] : insight and judgment were intact

## 2020-02-10 DIAGNOSIS — R20.2 ANESTHESIA OF SKIN: ICD-10-CM

## 2020-02-10 DIAGNOSIS — R20.0 ANESTHESIA OF SKIN: ICD-10-CM

## 2020-02-12 ENCOUNTER — RX RENEWAL (OUTPATIENT)
Age: 55
End: 2020-02-12

## 2020-02-18 ENCOUNTER — RX RENEWAL (OUTPATIENT)
Age: 55
End: 2020-02-18

## 2020-02-28 ENCOUNTER — APPOINTMENT (OUTPATIENT)
Dept: FAMILY MEDICINE | Facility: CLINIC | Age: 55
End: 2020-02-28

## 2020-04-29 ENCOUNTER — RX RENEWAL (OUTPATIENT)
Age: 55
End: 2020-04-29

## 2020-04-30 ENCOUNTER — APPOINTMENT (OUTPATIENT)
Dept: FAMILY MEDICINE | Facility: CLINIC | Age: 55
End: 2020-04-30

## 2020-05-14 ENCOUNTER — APPOINTMENT (OUTPATIENT)
Dept: FAMILY MEDICINE | Facility: CLINIC | Age: 55
End: 2020-05-14
Payer: COMMERCIAL

## 2020-05-14 DIAGNOSIS — D17.1 BENIGN LIPOMATOUS NEOPLASM OF SKIN AND SUBCUTANEOUS TISSUE OF TRUNK: ICD-10-CM

## 2020-05-14 DIAGNOSIS — M54.9 DORSALGIA, UNSPECIFIED: ICD-10-CM

## 2020-05-14 DIAGNOSIS — F41.8 OTHER SPECIFIED ANXIETY DISORDERS: ICD-10-CM

## 2020-05-14 DIAGNOSIS — G89.29 DORSALGIA, UNSPECIFIED: ICD-10-CM

## 2020-05-14 PROCEDURE — 99213 OFFICE O/P EST LOW 20 MIN: CPT | Mod: 95

## 2020-05-14 NOTE — PLAN
[FreeTextEntry1] : Anxiety: advised to take Lexapro daily\par Xanax prn, advised infreq use\par BAck pain/ Neuropathy: renewed gabapentin\par reports has to use in daytime sometimes taking 3 a day instead of 2.\par

## 2020-05-14 NOTE — PHYSICAL EXAM
[Normal] : no acute distress, well nourished, well developed and well-appearing [Normal Sclera/Conjunctiva] : normal sclera/conjunctiva

## 2020-08-04 ENCOUNTER — RX RENEWAL (OUTPATIENT)
Age: 55
End: 2020-08-04

## 2020-08-06 ENCOUNTER — RX RENEWAL (OUTPATIENT)
Age: 55
End: 2020-08-06

## 2020-10-28 PROBLEM — F10.10 ALCOHOL ABUSE: Status: RESOLVED | Noted: 2019-01-27 | Resolved: 2020-01-01

## 2020-10-29 NOTE — ASSESSMENT
[FreeTextEntry1] : Cirrhosis/ Ascites\par Called Dr López to get consult and labs\par CT waiting for auth. quit drinking 3 months ago.\par Getting Ascites drained today.\par Had labs Anemia.\par \par Anxiety, stopped taking Lexapro because of nausea. Willing to try again\par \par Flu vaccine 9/20

## 2020-10-29 NOTE — HISTORY OF PRESENT ILLNESS
[FreeTextEntry1] : follow up [de-identified] : Ms. HIEN DANIEL is a 55 year old female presenting for a follow up\par Seen by Dr López recently. CT waiting for auth. quit drinking 3 months ago.\par Getting Ascites drained today.\par Had labs Anemia.\par Anxiety, stopped taking Lexapro because of nausea. Willing to try again

## 2020-10-29 NOTE — PHYSICAL EXAM
[Regular Rhythm] : with a regular rhythm [Normal S1, S2] : normal S1 and S2 [Soft] : abdomen soft [Non Tender] : non-tender [Normal Bowel Sounds] : normal bowel sounds [Normal Gait] : normal gait [Normal] : affect was normal and insight and judgment were intact [de-identified] : yellow sclera [de-identified] : ascites, distended

## 2021-01-01 ENCOUNTER — NON-APPOINTMENT (OUTPATIENT)
Age: 56
End: 2021-01-01

## 2021-01-01 ENCOUNTER — RX RENEWAL (OUTPATIENT)
Age: 56
End: 2021-01-01

## 2021-01-01 ENCOUNTER — INPATIENT (INPATIENT)
Facility: HOSPITAL | Age: 56
LOS: 0 days | Discharge: OTHER INSTITUTION | DRG: 441 | End: 2021-08-29
Attending: INTERNAL MEDICINE | Admitting: INTERNAL MEDICINE
Payer: COMMERCIAL

## 2021-01-01 ENCOUNTER — APPOINTMENT (OUTPATIENT)
Dept: FAMILY MEDICINE | Facility: CLINIC | Age: 56
End: 2021-01-01
Payer: COMMERCIAL

## 2021-01-01 ENCOUNTER — TRANSCRIPTION ENCOUNTER (OUTPATIENT)
Age: 56
End: 2021-01-01

## 2021-01-01 ENCOUNTER — APPOINTMENT (OUTPATIENT)
Dept: FAMILY MEDICINE | Facility: CLINIC | Age: 56
End: 2021-01-01

## 2021-01-01 ENCOUNTER — INPATIENT (INPATIENT)
Facility: HOSPITAL | Age: 56
LOS: 9 days | DRG: 441 | End: 2021-09-08
Attending: INTERNAL MEDICINE | Admitting: INTERNAL MEDICINE
Payer: COMMERCIAL

## 2021-01-01 VITALS
BODY MASS INDEX: 29.95 KG/M2 | HEIGHT: 63 IN | TEMPERATURE: 97.5 F | SYSTOLIC BLOOD PRESSURE: 112 MMHG | WEIGHT: 169 LBS | HEART RATE: 87 BPM | DIASTOLIC BLOOD PRESSURE: 74 MMHG | OXYGEN SATURATION: 97 %

## 2021-01-01 VITALS
HEIGHT: 63 IN | TEMPERATURE: 99 F | RESPIRATION RATE: 19 BRPM | DIASTOLIC BLOOD PRESSURE: 55 MMHG | WEIGHT: 178.57 LBS | OXYGEN SATURATION: 99 % | SYSTOLIC BLOOD PRESSURE: 104 MMHG | HEART RATE: 70 BPM

## 2021-01-01 VITALS
OXYGEN SATURATION: 99 % | DIASTOLIC BLOOD PRESSURE: 72 MMHG | HEIGHT: 63 IN | BODY MASS INDEX: 28.7 KG/M2 | SYSTOLIC BLOOD PRESSURE: 119 MMHG | WEIGHT: 162 LBS | HEART RATE: 82 BPM | TEMPERATURE: 97.4 F

## 2021-01-01 VITALS — WEIGHT: 175.05 LBS | HEIGHT: 63 IN

## 2021-01-01 VITALS — RESPIRATION RATE: 25 BRPM | HEART RATE: 91 BPM | OXYGEN SATURATION: 95 %

## 2021-01-01 VITALS
OXYGEN SATURATION: 99 % | HEIGHT: 63 IN | DIASTOLIC BLOOD PRESSURE: 72 MMHG | BODY MASS INDEX: 26.58 KG/M2 | HEART RATE: 59 BPM | SYSTOLIC BLOOD PRESSURE: 112 MMHG | TEMPERATURE: 96.7 F | WEIGHT: 150 LBS

## 2021-01-01 VITALS
RESPIRATION RATE: 15 BRPM | HEART RATE: 72 BPM | DIASTOLIC BLOOD PRESSURE: 84 MMHG | OXYGEN SATURATION: 98 % | SYSTOLIC BLOOD PRESSURE: 142 MMHG

## 2021-01-01 DIAGNOSIS — K74.60 UNSPECIFIED CIRRHOSIS OF LIVER: ICD-10-CM

## 2021-01-01 DIAGNOSIS — M25.561 PAIN IN RIGHT KNEE: ICD-10-CM

## 2021-01-01 DIAGNOSIS — Z01.818 ENCOUNTER FOR OTHER PREPROCEDURAL EXAMINATION: ICD-10-CM

## 2021-01-01 DIAGNOSIS — Z00.00 ENCOUNTER FOR GENERAL ADULT MEDICAL EXAMINATION W/OUT ABNORMAL FINDINGS: ICD-10-CM

## 2021-01-01 DIAGNOSIS — F41.9 ANXIETY DISORDER, UNSPECIFIED: ICD-10-CM

## 2021-01-01 DIAGNOSIS — Z13.31 ENCOUNTER FOR SCREENING FOR DEPRESSION: ICD-10-CM

## 2021-01-01 DIAGNOSIS — N17.9 ACUTE KIDNEY FAILURE, UNSPECIFIED: ICD-10-CM

## 2021-01-01 DIAGNOSIS — M25.562 PAIN IN RIGHT KNEE: ICD-10-CM

## 2021-01-01 DIAGNOSIS — D64.9 ANEMIA, UNSPECIFIED: ICD-10-CM

## 2021-01-01 DIAGNOSIS — Z12.39 ENCOUNTER FOR OTHER SCREENING FOR MALIGNANT NEOPLASM OF BREAST: ICD-10-CM

## 2021-01-01 DIAGNOSIS — D17.1 BENIGN LIPOMATOUS NEOPLASM OF SKIN AND SUBCUTANEOUS TISSUE OF TRUNK: Chronic | ICD-10-CM

## 2021-01-01 DIAGNOSIS — Z98.890 OTHER SPECIFIED POSTPROCEDURAL STATES: Chronic | ICD-10-CM

## 2021-01-01 DIAGNOSIS — H26.9 UNSPECIFIED CATARACT: ICD-10-CM

## 2021-01-01 DIAGNOSIS — E87.5 HYPERKALEMIA: ICD-10-CM

## 2021-01-01 DIAGNOSIS — E87.1 HYPO-OSMOLALITY AND HYPONATREMIA: ICD-10-CM

## 2021-01-01 DIAGNOSIS — K21.9 GASTRO-ESOPHAGEAL REFLUX DISEASE WITHOUT ESOPHAGITIS: ICD-10-CM

## 2021-01-01 DIAGNOSIS — M54.2 CERVICALGIA: ICD-10-CM

## 2021-01-01 DIAGNOSIS — N17.0 ACUTE KIDNEY FAILURE WITH TUBULAR NECROSIS: ICD-10-CM

## 2021-01-01 DIAGNOSIS — K70.30 ALCOHOLIC CIRRHOSIS OF LIVER WITHOUT ASCITES: ICD-10-CM

## 2021-01-01 DIAGNOSIS — F10.10 ALCOHOL ABUSE, UNCOMPLICATED: ICD-10-CM

## 2021-01-01 DIAGNOSIS — K72.00 ACUTE AND SUBACUTE HEPATIC FAILURE WITHOUT COMA: ICD-10-CM

## 2021-01-01 DIAGNOSIS — E78.5 HYPERLIPIDEMIA, UNSPECIFIED: ICD-10-CM

## 2021-01-01 DIAGNOSIS — E80.6 OTHER DISORDERS OF BILIRUBIN METABOLISM: ICD-10-CM

## 2021-01-01 DIAGNOSIS — R29.898 OTHER SYMPTOMS AND SIGNS INVOLVING THE MUSCULOSKELETAL SYSTEM: ICD-10-CM

## 2021-01-01 DIAGNOSIS — Z13.820 ENCOUNTER FOR SCREENING FOR OSTEOPOROSIS: ICD-10-CM

## 2021-01-01 DIAGNOSIS — K76.0 FATTY (CHANGE OF) LIVER, NOT ELSEWHERE CLASSIFIED: ICD-10-CM

## 2021-01-01 DIAGNOSIS — E87.2 ACIDOSIS: ICD-10-CM

## 2021-01-01 DIAGNOSIS — R79.89 OTHER SPECIFIED ABNORMAL FINDINGS OF BLOOD CHEMISTRY: ICD-10-CM

## 2021-01-01 LAB
-  AMIKACIN: SIGNIFICANT CHANGE UP
-  AMOXICILLIN/CLAVULANIC ACID: SIGNIFICANT CHANGE UP
-  AMOXICILLIN/CLAVULANIC ACID: SIGNIFICANT CHANGE UP
-  AMPICILLIN/SULBACTAM: SIGNIFICANT CHANGE UP
-  AMPICILLIN: SIGNIFICANT CHANGE UP
-  AZTREONAM: SIGNIFICANT CHANGE UP
-  CEFAZOLIN: SIGNIFICANT CHANGE UP
-  CEFEPIME: SIGNIFICANT CHANGE UP
-  CEFOXITIN: SIGNIFICANT CHANGE UP
-  CEFTRIAXONE: SIGNIFICANT CHANGE UP
-  CIPROFLOXACIN: SIGNIFICANT CHANGE UP
-  ERTAPENEM: SIGNIFICANT CHANGE UP
-  GENTAMICIN: SIGNIFICANT CHANGE UP
-  IMIPENEM: SIGNIFICANT CHANGE UP
-  LEVOFLOXACIN: SIGNIFICANT CHANGE UP
-  MEROPENEM: SIGNIFICANT CHANGE UP
-  NITROFURANTOIN: SIGNIFICANT CHANGE UP
-  NITROFURANTOIN: SIGNIFICANT CHANGE UP
-  PIPERACILLIN/TAZOBACTAM: SIGNIFICANT CHANGE UP
-  TIGECYCLINE: SIGNIFICANT CHANGE UP
-  TIGECYCLINE: SIGNIFICANT CHANGE UP
-  TOBRAMYCIN: SIGNIFICANT CHANGE UP
-  TRIMETHOPRIM/SULFAMETHOXAZOLE: SIGNIFICANT CHANGE UP
A1AT SERPL-MCNC: 159 MG/DL — SIGNIFICANT CHANGE UP (ref 90–200)
A1C WITH ESTIMATED AVERAGE GLUCOSE RESULT: 5 % — SIGNIFICANT CHANGE UP (ref 4–5.6)
ALBUMIN SERPL ELPH-MCNC: 2.4 G/DL — LOW (ref 3.3–5)
ALBUMIN SERPL ELPH-MCNC: 2.5 G/DL — LOW (ref 3.3–5)
ALBUMIN SERPL ELPH-MCNC: 3 G/DL — LOW (ref 3.3–5)
ALBUMIN SERPL ELPH-MCNC: 3 G/DL — LOW (ref 3.3–5)
ALBUMIN SERPL ELPH-MCNC: 3.2 G/DL — LOW (ref 3.3–5)
ALBUMIN SERPL ELPH-MCNC: 3.3 G/DL — SIGNIFICANT CHANGE UP (ref 3.3–5)
ALBUMIN SERPL ELPH-MCNC: 3.4 G/DL — SIGNIFICANT CHANGE UP (ref 3.3–5)
ALBUMIN SERPL ELPH-MCNC: 3.5 G/DL — SIGNIFICANT CHANGE UP (ref 3.3–5)
ALBUMIN SERPL ELPH-MCNC: 3.5 G/DL — SIGNIFICANT CHANGE UP (ref 3.3–5)
ALBUMIN SERPL ELPH-MCNC: 3.6 G/DL — SIGNIFICANT CHANGE UP (ref 3.3–5)
ALBUMIN SERPL ELPH-MCNC: 3.7 G/DL — SIGNIFICANT CHANGE UP (ref 3.3–5)
ALBUMIN SERPL ELPH-MCNC: 3.9 G/DL — SIGNIFICANT CHANGE UP (ref 3.3–5)
ALP SERPL-CCNC: 173 U/L — HIGH (ref 40–120)
ALP SERPL-CCNC: 184 U/L — HIGH (ref 40–120)
ALP SERPL-CCNC: 185 U/L — HIGH (ref 40–120)
ALP SERPL-CCNC: 189 U/L — HIGH (ref 40–120)
ALP SERPL-CCNC: 193 U/L — HIGH (ref 40–120)
ALP SERPL-CCNC: 200 U/L — HIGH (ref 40–120)
ALP SERPL-CCNC: 201 U/L — HIGH (ref 40–120)
ALP SERPL-CCNC: 204 U/L — HIGH (ref 40–120)
ALP SERPL-CCNC: 209 U/L — HIGH (ref 40–120)
ALP SERPL-CCNC: 217 U/L — HIGH (ref 40–120)
ALP SERPL-CCNC: 248 U/L — HIGH (ref 40–120)
ALP SERPL-CCNC: 251 U/L — HIGH (ref 40–120)
ALP SERPL-CCNC: 290 U/L — HIGH (ref 40–120)
ALP SERPL-CCNC: 306 U/L — HIGH (ref 40–120)
ALT FLD-CCNC: 110 U/L — HIGH (ref 12–78)
ALT FLD-CCNC: 113 U/L — HIGH (ref 12–78)
ALT FLD-CCNC: 61 U/L — HIGH (ref 10–45)
ALT FLD-CCNC: 63 U/L — HIGH (ref 10–45)
ALT FLD-CCNC: 64 U/L — HIGH (ref 10–45)
ALT FLD-CCNC: 65 U/L — HIGH (ref 10–45)
ALT FLD-CCNC: 72 U/L — HIGH (ref 10–45)
ALT FLD-CCNC: 74 U/L — HIGH (ref 10–45)
ALT FLD-CCNC: 81 U/L — HIGH (ref 10–45)
ALT FLD-CCNC: 81 U/L — HIGH (ref 10–45)
ALT FLD-CCNC: 84 U/L — HIGH (ref 10–45)
ALT FLD-CCNC: 85 U/L — HIGH (ref 10–45)
ALT FLD-CCNC: 86 U/L — HIGH (ref 10–45)
ALT FLD-CCNC: 87 U/L — HIGH (ref 10–45)
AMMONIA BLD-MCNC: 46 UMOL/L — SIGNIFICANT CHANGE UP (ref 11–55)
AMMONIA BLD-MCNC: 48 UMOL/L — SIGNIFICANT CHANGE UP (ref 11–55)
AMMONIA BLD-MCNC: 65 UMOL/L — HIGH (ref 11–55)
AMMONIA BLD-MCNC: 75 UMOL/L — HIGH (ref 11–32)
AMMONIA BLD-MCNC: 81 UMOL/L — HIGH (ref 11–32)
AMMONIA BLD-MCNC: 82 UMOL/L — HIGH (ref 11–55)
ANA TITR SER: NEGATIVE — SIGNIFICANT CHANGE UP
ANION GAP SERPL CALC-SCNC: 14 MMOL/L — SIGNIFICANT CHANGE UP (ref 5–17)
ANION GAP SERPL CALC-SCNC: 15 MMOL/L — SIGNIFICANT CHANGE UP (ref 5–17)
ANION GAP SERPL CALC-SCNC: 16 MMOL/L — SIGNIFICANT CHANGE UP (ref 5–17)
ANION GAP SERPL CALC-SCNC: 17 MMOL/L — SIGNIFICANT CHANGE UP (ref 5–17)
ANION GAP SERPL CALC-SCNC: 18 MMOL/L — HIGH (ref 5–17)
ANION GAP SERPL CALC-SCNC: 19 MMOL/L — HIGH (ref 5–17)
ANION GAP SERPL CALC-SCNC: 20 MMOL/L — HIGH (ref 5–17)
ANION GAP SERPL CALC-SCNC: 20 MMOL/L — HIGH (ref 5–17)
ANION GAP SERPL CALC-SCNC: 21 MMOL/L — HIGH (ref 5–17)
ANION GAP SERPL CALC-SCNC: 22 MMOL/L — HIGH (ref 5–17)
ANION GAP SERPL CALC-SCNC: 23 MMOL/L — HIGH (ref 5–17)
ANION GAP SERPL CALC-SCNC: 24 MMOL/L — HIGH (ref 5–17)
ANION GAP SERPL CALC-SCNC: 24 MMOL/L — HIGH (ref 5–17)
ANION GAP SERPL CALC-SCNC: 25 MMOL/L — HIGH (ref 5–17)
ANION GAP SERPL CALC-SCNC: 26 MMOL/L — HIGH (ref 5–17)
ANION GAP SERPL CALC-SCNC: 27 MMOL/L — HIGH (ref 5–17)
ANION GAP SERPL CALC-SCNC: 30 MMOL/L — HIGH (ref 5–17)
ANION GAP SERPL CALC-SCNC: 35 MMOL/L — HIGH (ref 5–17)
ANISOCYTOSIS BLD QL: SIGNIFICANT CHANGE UP
APPEARANCE UR: ABNORMAL
APPEARANCE UR: ABNORMAL
APTT BLD: 30.5 SEC — SIGNIFICANT CHANGE UP (ref 27.5–35.5)
APTT BLD: 33.5 SEC — SIGNIFICANT CHANGE UP (ref 27.5–35.5)
APTT BLD: 40.1 SEC — HIGH (ref 27.5–35.5)
APTT BLD: 40.2 SEC — HIGH (ref 27.5–35.5)
APTT BLD: 41.4 SEC — HIGH (ref 27.5–35.5)
APTT BLD: 58.5 SEC — HIGH (ref 27.5–35.5)
APTT BLD: 94.8 SEC — HIGH (ref 27.5–35.5)
APTT BLD: >200 SEC — CRITICAL HIGH (ref 27.5–35.5)
APTT BLD: >200 SEC — CRITICAL HIGH (ref 27.5–35.5)
AST SERPL-CCNC: 114 U/L — HIGH (ref 10–40)
AST SERPL-CCNC: 134 U/L — HIGH (ref 10–40)
AST SERPL-CCNC: 144 U/L — HIGH (ref 10–40)
AST SERPL-CCNC: 146 U/L — HIGH (ref 10–40)
AST SERPL-CCNC: 151 U/L — HIGH (ref 10–40)
AST SERPL-CCNC: 158 U/L — HIGH (ref 10–40)
AST SERPL-CCNC: 173 U/L — HIGH (ref 10–40)
AST SERPL-CCNC: 183 U/L — HIGH (ref 15–37)
AST SERPL-CCNC: 205 U/L — HIGH (ref 15–37)
AST SERPL-CCNC: 84 U/L — HIGH (ref 10–40)
AST SERPL-CCNC: 87 U/L — HIGH (ref 10–40)
AST SERPL-CCNC: 90 U/L — HIGH (ref 10–40)
AST SERPL-CCNC: 95 U/L — HIGH (ref 10–40)
AST SERPL-CCNC: 98 U/L — HIGH (ref 10–40)
BACTERIA # UR AUTO: ABNORMAL
BACTERIA # UR AUTO: NEGATIVE — SIGNIFICANT CHANGE UP
BASE EXCESS BLDV CALC-SCNC: -10 MMOL/L — LOW (ref -2–2)
BASE EXCESS BLDV CALC-SCNC: -10.7 MMOL/L — LOW (ref -2–2)
BASE EXCESS BLDV CALC-SCNC: -3.7 MMOL/L — LOW (ref -2–2)
BASE EXCESS BLDV CALC-SCNC: -7.2 MMOL/L — LOW (ref -2–2)
BASE EXCESS BLDV CALC-SCNC: -7.8 MMOL/L — LOW (ref -2–2)
BASE EXCESS BLDV CALC-SCNC: -8.3 MMOL/L — LOW (ref -2–2)
BASE EXCESS BLDV CALC-SCNC: -8.7 MMOL/L — LOW (ref -2–2)
BASE EXCESS BLDV CALC-SCNC: -9.1 MMOL/L — LOW (ref -2–2)
BASE EXCESS BLDV CALC-SCNC: -9.4 MMOL/L — LOW (ref -2–2)
BASE EXCESS BLDV CALC-SCNC: -9.5 MMOL/L — LOW (ref -2–2)
BASOPHILS # BLD AUTO: 0 K/UL — SIGNIFICANT CHANGE UP (ref 0–0.2)
BASOPHILS # BLD AUTO: 0.02 K/UL — SIGNIFICANT CHANGE UP (ref 0–0.2)
BASOPHILS # BLD AUTO: 0.02 K/UL — SIGNIFICANT CHANGE UP (ref 0–0.2)
BASOPHILS # BLD AUTO: 0.03 K/UL — SIGNIFICANT CHANGE UP (ref 0–0.2)
BASOPHILS NFR BLD AUTO: 0 % — SIGNIFICANT CHANGE UP (ref 0–2)
BASOPHILS NFR BLD AUTO: 0.1 % — SIGNIFICANT CHANGE UP (ref 0–2)
BASOPHILS NFR BLD AUTO: 0.1 % — SIGNIFICANT CHANGE UP (ref 0–2)
BASOPHILS NFR BLD AUTO: 0.2 % — SIGNIFICANT CHANGE UP (ref 0–2)
BILIRUB DIRECT SERPL-MCNC: 23 MG/DL — HIGH (ref 0–0.2)
BILIRUB DIRECT SERPL-MCNC: 23.8 MG/DL — HIGH (ref 0–0.2)
BILIRUB INDIRECT FLD-MCNC: 2 MG/DL — HIGH (ref 0.2–1)
BILIRUB INDIRECT FLD-MCNC: 4.1 MG/DL — HIGH (ref 0.2–1)
BILIRUB SERPL-MCNC: 21 MG/DL — HIGH (ref 0.2–1.2)
BILIRUB SERPL-MCNC: 21.3 MG/DL — HIGH (ref 0.2–1.2)
BILIRUB SERPL-MCNC: 21.4 MG/DL — HIGH (ref 0.2–1.2)
BILIRUB SERPL-MCNC: 22.3 MG/DL — HIGH (ref 0.2–1.2)
BILIRUB SERPL-MCNC: 22.4 MG/DL — HIGH (ref 0.2–1.2)
BILIRUB SERPL-MCNC: 22.8 MG/DL — HIGH (ref 0.2–1.2)
BILIRUB SERPL-MCNC: 23 MG/DL — HIGH (ref 0.2–1.2)
BILIRUB SERPL-MCNC: 23.6 MG/DL — HIGH (ref 0.2–1.2)
BILIRUB SERPL-MCNC: 25.8 MG/DL — HIGH (ref 0.2–1.2)
BILIRUB SERPL-MCNC: 25.8 MG/DL — HIGH (ref 0.2–1.2)
BILIRUB SERPL-MCNC: 26.3 MG/DL — HIGH (ref 0.2–1.2)
BILIRUB SERPL-MCNC: 26.3 MG/DL — HIGH (ref 0.2–1.2)
BILIRUB SERPL-MCNC: 27 MG/DL — HIGH (ref 0.2–1.2)
BILIRUB SERPL-MCNC: 27.1 MG/DL — HIGH (ref 0.2–1.2)
BILIRUB SERPL-MCNC: 27.2 MG/DL — HIGH (ref 0.2–1.2)
BILIRUB UR-MCNC: ABNORMAL
BILIRUB UR-MCNC: ABNORMAL
BLD GP AB SCN SERPL QL: NEGATIVE — SIGNIFICANT CHANGE UP
BUN SERPL-MCNC: 101 MG/DL — HIGH (ref 7–23)
BUN SERPL-MCNC: 102 MG/DL — HIGH (ref 7–23)
BUN SERPL-MCNC: 114 MG/DL — HIGH (ref 7–23)
BUN SERPL-MCNC: 137 MG/DL — HIGH (ref 7–23)
BUN SERPL-MCNC: 14 MG/DL — SIGNIFICANT CHANGE UP (ref 7–23)
BUN SERPL-MCNC: 146 MG/DL — HIGH (ref 7–23)
BUN SERPL-MCNC: 15 MG/DL — SIGNIFICANT CHANGE UP (ref 7–23)
BUN SERPL-MCNC: 150 MG/DL — HIGH (ref 7–23)
BUN SERPL-MCNC: 152 MG/DL — HIGH (ref 7–23)
BUN SERPL-MCNC: 156 MG/DL — HIGH (ref 7–23)
BUN SERPL-MCNC: 157 MG/DL — HIGH (ref 7–23)
BUN SERPL-MCNC: 16 MG/DL — SIGNIFICANT CHANGE UP (ref 7–23)
BUN SERPL-MCNC: 161 MG/DL — HIGH (ref 7–23)
BUN SERPL-MCNC: 17 MG/DL — SIGNIFICANT CHANGE UP (ref 7–23)
BUN SERPL-MCNC: 18 MG/DL — SIGNIFICANT CHANGE UP (ref 7–23)
BUN SERPL-MCNC: 18 MG/DL — SIGNIFICANT CHANGE UP (ref 7–23)
BUN SERPL-MCNC: 20 MG/DL — SIGNIFICANT CHANGE UP (ref 7–23)
BUN SERPL-MCNC: 21 MG/DL — SIGNIFICANT CHANGE UP (ref 7–23)
BUN SERPL-MCNC: 22 MG/DL — SIGNIFICANT CHANGE UP (ref 7–23)
BUN SERPL-MCNC: 22 MG/DL — SIGNIFICANT CHANGE UP (ref 7–23)
BUN SERPL-MCNC: 24 MG/DL — HIGH (ref 7–23)
BUN SERPL-MCNC: 28 MG/DL — HIGH (ref 7–23)
BUN SERPL-MCNC: 33 MG/DL — HIGH (ref 7–23)
BUN SERPL-MCNC: 34 MG/DL — HIGH (ref 7–23)
BUN SERPL-MCNC: 40 MG/DL — HIGH (ref 7–23)
BUN SERPL-MCNC: 41 MG/DL — HIGH (ref 7–23)
BUN SERPL-MCNC: 41 MG/DL — HIGH (ref 7–23)
BUN SERPL-MCNC: 42 MG/DL — HIGH (ref 7–23)
BUN SERPL-MCNC: 47 MG/DL — HIGH (ref 7–23)
BUN SERPL-MCNC: 57 MG/DL — HIGH (ref 7–23)
BUN SERPL-MCNC: 58 MG/DL — HIGH (ref 7–23)
BUN SERPL-MCNC: 71 MG/DL — HIGH (ref 7–23)
BUN SERPL-MCNC: 76 MG/DL — HIGH (ref 7–23)
BUN SERPL-MCNC: 90 MG/DL — HIGH (ref 7–23)
BUN SERPL-MCNC: 91 MG/DL — HIGH (ref 7–23)
BUN SERPL-MCNC: 94 MG/DL — HIGH (ref 7–23)
BUN SERPL-MCNC: 97 MG/DL — HIGH (ref 7–23)
CA-I SERPL-SCNC: 0.67 MMOL/L — CRITICAL LOW (ref 1.15–1.33)
CA-I SERPL-SCNC: 0.67 MMOL/L — CRITICAL LOW (ref 1.15–1.33)
CA-I SERPL-SCNC: 0.81 MMOL/L — LOW (ref 1.15–1.33)
CA-I SERPL-SCNC: 1.03 MMOL/L — LOW (ref 1.15–1.33)
CA-I SERPL-SCNC: 1.16 MMOL/L — SIGNIFICANT CHANGE UP (ref 1.15–1.33)
CA-I SERPL-SCNC: 1.16 MMOL/L — SIGNIFICANT CHANGE UP (ref 1.15–1.33)
CA-I SERPL-SCNC: 1.2 MMOL/L — SIGNIFICANT CHANGE UP (ref 1.15–1.33)
CA-I SERPL-SCNC: 1.22 MMOL/L — SIGNIFICANT CHANGE UP (ref 1.15–1.33)
CALCIUM SERPL-MCNC: 10 MG/DL — SIGNIFICANT CHANGE UP (ref 8.4–10.5)
CALCIUM SERPL-MCNC: 6.1 MG/DL — CRITICAL LOW (ref 8.4–10.5)
CALCIUM SERPL-MCNC: 6.4 MG/DL — CRITICAL LOW (ref 8.5–10.1)
CALCIUM SERPL-MCNC: 6.5 MG/DL — CRITICAL LOW (ref 8.5–10.1)
CALCIUM SERPL-MCNC: 6.5 MG/DL — CRITICAL LOW (ref 8.5–10.1)
CALCIUM SERPL-MCNC: 6.6 MG/DL — LOW (ref 8.5–10.1)
CALCIUM SERPL-MCNC: 6.8 MG/DL — LOW (ref 8.5–10.1)
CALCIUM SERPL-MCNC: 6.9 MG/DL — LOW (ref 8.5–10.1)
CALCIUM SERPL-MCNC: 7.2 MG/DL — LOW (ref 8.4–10.5)
CALCIUM SERPL-MCNC: 7.3 MG/DL — LOW (ref 8.4–10.5)
CALCIUM SERPL-MCNC: 7.3 MG/DL — LOW (ref 8.4–10.5)
CALCIUM SERPL-MCNC: 7.6 MG/DL — LOW (ref 8.4–10.5)
CALCIUM SERPL-MCNC: 7.9 MG/DL — LOW (ref 8.4–10.5)
CALCIUM SERPL-MCNC: 8.2 MG/DL — LOW (ref 8.4–10.5)
CALCIUM SERPL-MCNC: 8.3 MG/DL — LOW (ref 8.4–10.5)
CALCIUM SERPL-MCNC: 8.4 MG/DL — SIGNIFICANT CHANGE UP (ref 8.4–10.5)
CALCIUM SERPL-MCNC: 8.7 MG/DL — SIGNIFICANT CHANGE UP (ref 8.4–10.5)
CALCIUM SERPL-MCNC: 8.8 MG/DL — SIGNIFICANT CHANGE UP (ref 8.4–10.5)
CALCIUM SERPL-MCNC: 8.9 MG/DL — SIGNIFICANT CHANGE UP (ref 8.4–10.5)
CALCIUM SERPL-MCNC: 8.9 MG/DL — SIGNIFICANT CHANGE UP (ref 8.4–10.5)
CALCIUM SERPL-MCNC: 9 MG/DL — SIGNIFICANT CHANGE UP (ref 8.4–10.5)
CALCIUM SERPL-MCNC: 9.1 MG/DL — SIGNIFICANT CHANGE UP (ref 8.4–10.5)
CALCIUM SERPL-MCNC: 9.2 MG/DL — SIGNIFICANT CHANGE UP (ref 8.4–10.5)
CALCIUM SERPL-MCNC: 9.2 MG/DL — SIGNIFICANT CHANGE UP (ref 8.4–10.5)
CALCIUM SERPL-MCNC: 9.3 MG/DL — SIGNIFICANT CHANGE UP (ref 8.4–10.5)
CALCIUM SERPL-MCNC: 9.4 MG/DL — SIGNIFICANT CHANGE UP (ref 8.4–10.5)
CALCIUM SERPL-MCNC: 9.5 MG/DL — SIGNIFICANT CHANGE UP (ref 8.4–10.5)
CALCIUM SERPL-MCNC: 9.6 MG/DL — SIGNIFICANT CHANGE UP (ref 8.4–10.5)
CALCIUM SERPL-MCNC: 9.7 MG/DL — SIGNIFICANT CHANGE UP (ref 8.4–10.5)
CALCIUM SERPL-MCNC: 9.9 MG/DL — SIGNIFICANT CHANGE UP (ref 8.4–10.5)
CERULOPLASMIN SERPL-MCNC: 23 MG/DL — SIGNIFICANT CHANGE UP (ref 16–45)
CHLORIDE BLDV-SCNC: 103 MMOL/L — SIGNIFICANT CHANGE UP (ref 96–108)
CHLORIDE BLDV-SCNC: 103 MMOL/L — SIGNIFICANT CHANGE UP (ref 96–108)
CHLORIDE BLDV-SCNC: 104 MMOL/L — SIGNIFICANT CHANGE UP (ref 96–108)
CHLORIDE BLDV-SCNC: 106 MMOL/L — SIGNIFICANT CHANGE UP (ref 96–108)
CHLORIDE BLDV-SCNC: 78 MMOL/L — LOW (ref 96–108)
CHLORIDE BLDV-SCNC: 78 MMOL/L — LOW (ref 96–108)
CHLORIDE BLDV-SCNC: 90 MMOL/L — LOW (ref 96–108)
CHLORIDE BLDV-SCNC: 99 MMOL/L — SIGNIFICANT CHANGE UP (ref 96–108)
CHLORIDE SERPL-SCNC: 100 MMOL/L — SIGNIFICANT CHANGE UP (ref 96–108)
CHLORIDE SERPL-SCNC: 101 MMOL/L — SIGNIFICANT CHANGE UP (ref 96–108)
CHLORIDE SERPL-SCNC: 102 MMOL/L — SIGNIFICANT CHANGE UP (ref 96–108)
CHLORIDE SERPL-SCNC: 103 MMOL/L — SIGNIFICANT CHANGE UP (ref 96–108)
CHLORIDE SERPL-SCNC: 73 MMOL/L — LOW (ref 96–108)
CHLORIDE SERPL-SCNC: 75 MMOL/L — LOW (ref 96–108)
CHLORIDE SERPL-SCNC: 75 MMOL/L — LOW (ref 96–108)
CHLORIDE SERPL-SCNC: 76 MMOL/L — LOW (ref 96–108)
CHLORIDE SERPL-SCNC: 77 MMOL/L — LOW (ref 96–108)
CHLORIDE SERPL-SCNC: 78 MMOL/L — LOW (ref 96–108)
CHLORIDE SERPL-SCNC: 79 MMOL/L — LOW (ref 96–108)
CHLORIDE SERPL-SCNC: 81 MMOL/L — LOW (ref 96–108)
CHLORIDE SERPL-SCNC: 85 MMOL/L — LOW (ref 96–108)
CHLORIDE SERPL-SCNC: 86 MMOL/L — LOW (ref 96–108)
CHLORIDE SERPL-SCNC: 87 MMOL/L — LOW (ref 96–108)
CHLORIDE SERPL-SCNC: 88 MMOL/L — LOW (ref 96–108)
CHLORIDE SERPL-SCNC: 88 MMOL/L — LOW (ref 96–108)
CHLORIDE SERPL-SCNC: 90 MMOL/L — LOW (ref 96–108)
CHLORIDE SERPL-SCNC: 93 MMOL/L — LOW (ref 96–108)
CHLORIDE SERPL-SCNC: 94 MMOL/L — LOW (ref 96–108)
CHLORIDE SERPL-SCNC: 97 MMOL/L — SIGNIFICANT CHANGE UP (ref 96–108)
CHLORIDE SERPL-SCNC: 97 MMOL/L — SIGNIFICANT CHANGE UP (ref 96–108)
CHLORIDE SERPL-SCNC: 98 MMOL/L — SIGNIFICANT CHANGE UP (ref 96–108)
CHLORIDE SERPL-SCNC: 98 MMOL/L — SIGNIFICANT CHANGE UP (ref 96–108)
CHLORIDE SERPL-SCNC: 99 MMOL/L — SIGNIFICANT CHANGE UP (ref 96–108)
CO2 BLDV-SCNC: 16 MMOL/L — LOW (ref 22–26)
CO2 BLDV-SCNC: 17 MMOL/L — LOW (ref 22–26)
CO2 BLDV-SCNC: 18 MMOL/L — LOW (ref 22–26)
CO2 BLDV-SCNC: 18 MMOL/L — LOW (ref 22–26)
CO2 BLDV-SCNC: 19 MMOL/L — LOW (ref 22–26)
CO2 BLDV-SCNC: 22 MMOL/L — SIGNIFICANT CHANGE UP (ref 22–26)
CO2 SERPL-SCNC: 11 MMOL/L — LOW (ref 22–31)
CO2 SERPL-SCNC: 12 MMOL/L — LOW (ref 22–31)
CO2 SERPL-SCNC: 12 MMOL/L — LOW (ref 22–31)
CO2 SERPL-SCNC: 13 MMOL/L — LOW (ref 22–31)
CO2 SERPL-SCNC: 14 MMOL/L — LOW (ref 22–31)
CO2 SERPL-SCNC: 15 MMOL/L — LOW (ref 22–31)
CO2 SERPL-SCNC: 16 MMOL/L — LOW (ref 22–31)
CO2 SERPL-SCNC: 17 MMOL/L — LOW (ref 22–31)
CO2 SERPL-SCNC: 18 MMOL/L — LOW (ref 22–31)
CO2 SERPL-SCNC: 19 MMOL/L — LOW (ref 22–31)
CO2 SERPL-SCNC: 20 MMOL/L — LOW (ref 22–31)
CO2 SERPL-SCNC: 20 MMOL/L — LOW (ref 22–31)
CO2 SERPL-SCNC: 21 MMOL/L — LOW (ref 22–31)
CO2 SERPL-SCNC: 21 MMOL/L — LOW (ref 22–31)
CO2 SERPL-SCNC: <10 MMOL/L — CRITICAL LOW (ref 22–31)
COLOR SPEC: ABNORMAL
COLOR SPEC: ABNORMAL
COMMENT - URINE: SIGNIFICANT CHANGE UP
COMMENT - URINE: SIGNIFICANT CHANGE UP
COVID-19 SPIKE DOMAIN AB INTERP: POSITIVE
COVID-19 SPIKE DOMAIN ANTIBODY RESULT: 125 U/ML — HIGH
CREAT SERPL-MCNC: 1.03 MG/DL — SIGNIFICANT CHANGE UP (ref 0.5–1.3)
CREAT SERPL-MCNC: 1.04 MG/DL — SIGNIFICANT CHANGE UP (ref 0.5–1.3)
CREAT SERPL-MCNC: 1.07 MG/DL — SIGNIFICANT CHANGE UP (ref 0.5–1.3)
CREAT SERPL-MCNC: 1.14 MG/DL — SIGNIFICANT CHANGE UP (ref 0.5–1.3)
CREAT SERPL-MCNC: 1.17 MG/DL — SIGNIFICANT CHANGE UP (ref 0.5–1.3)
CREAT SERPL-MCNC: 1.18 MG/DL — SIGNIFICANT CHANGE UP (ref 0.5–1.3)
CREAT SERPL-MCNC: 1.2 MG/DL — SIGNIFICANT CHANGE UP (ref 0.5–1.3)
CREAT SERPL-MCNC: 1.22 MG/DL — SIGNIFICANT CHANGE UP (ref 0.5–1.3)
CREAT SERPL-MCNC: 1.25 MG/DL — SIGNIFICANT CHANGE UP (ref 0.5–1.3)
CREAT SERPL-MCNC: 1.26 MG/DL — SIGNIFICANT CHANGE UP (ref 0.5–1.3)
CREAT SERPL-MCNC: 1.35 MG/DL — HIGH (ref 0.5–1.3)
CREAT SERPL-MCNC: 1.37 MG/DL — HIGH (ref 0.5–1.3)
CREAT SERPL-MCNC: 1.39 MG/DL — HIGH (ref 0.5–1.3)
CREAT SERPL-MCNC: 1.42 MG/DL — HIGH (ref 0.5–1.3)
CREAT SERPL-MCNC: 1.49 MG/DL — HIGH (ref 0.5–1.3)
CREAT SERPL-MCNC: 1.55 MG/DL — HIGH (ref 0.5–1.3)
CREAT SERPL-MCNC: 1.58 MG/DL — HIGH (ref 0.5–1.3)
CREAT SERPL-MCNC: 1.76 MG/DL — HIGH (ref 0.5–1.3)
CREAT SERPL-MCNC: 1.82 MG/DL — HIGH (ref 0.5–1.3)
CREAT SERPL-MCNC: 13.95 MG/DL — HIGH (ref 0.5–1.3)
CREAT SERPL-MCNC: 15.7 MG/DL — HIGH (ref 0.5–1.3)
CREAT SERPL-MCNC: 16.2 MG/DL — HIGH (ref 0.5–1.3)
CREAT SERPL-MCNC: 16.2 MG/DL — HIGH (ref 0.5–1.3)
CREAT SERPL-MCNC: 16.3 MG/DL — HIGH (ref 0.5–1.3)
CREAT SERPL-MCNC: 16.5 MG/DL — HIGH (ref 0.5–1.3)
CREAT SERPL-MCNC: 16.6 MG/DL — HIGH (ref 0.5–1.3)
CREAT SERPL-MCNC: 2.22 MG/DL — HIGH (ref 0.5–1.3)
CREAT SERPL-MCNC: 2.37 MG/DL — HIGH (ref 0.5–1.3)
CREAT SERPL-MCNC: 2.52 MG/DL — HIGH (ref 0.5–1.3)
CREAT SERPL-MCNC: 2.8 MG/DL — HIGH (ref 0.5–1.3)
CREAT SERPL-MCNC: 2.8 MG/DL — HIGH (ref 0.5–1.3)
CREAT SERPL-MCNC: 2.83 MG/DL — HIGH (ref 0.5–1.3)
CREAT SERPL-MCNC: 2.86 MG/DL — HIGH (ref 0.5–1.3)
CREAT SERPL-MCNC: 2.94 MG/DL — HIGH (ref 0.5–1.3)
CREAT SERPL-MCNC: 2.95 MG/DL — HIGH (ref 0.5–1.3)
CREAT SERPL-MCNC: 3.33 MG/DL — HIGH (ref 0.5–1.3)
CREAT SERPL-MCNC: 3.36 MG/DL — HIGH (ref 0.5–1.3)
CREAT SERPL-MCNC: 3.9 MG/DL — HIGH (ref 0.5–1.3)
CREAT SERPL-MCNC: 4.74 MG/DL — HIGH (ref 0.5–1.3)
CREAT SERPL-MCNC: 5.46 MG/DL — HIGH (ref 0.5–1.3)
CREAT SERPL-MCNC: 7.38 MG/DL — HIGH (ref 0.5–1.3)
CREAT SERPL-MCNC: 7.77 MG/DL — HIGH (ref 0.5–1.3)
CREAT SERPL-MCNC: 8.21 MG/DL — HIGH (ref 0.5–1.3)
CREAT SERPL-MCNC: 9.87 MG/DL — HIGH (ref 0.5–1.3)
CULTURE RESULTS: SIGNIFICANT CHANGE UP
DIFF PNL FLD: ABNORMAL
DIFF PNL FLD: ABNORMAL
E COLI DNA BLD POS QL NAA+NON-PROBE: SIGNIFICANT CHANGE UP
EOSINOPHIL # BLD AUTO: 0 K/UL — SIGNIFICANT CHANGE UP (ref 0–0.5)
EOSINOPHIL # BLD AUTO: 0.03 K/UL — SIGNIFICANT CHANGE UP (ref 0–0.5)
EOSINOPHIL # BLD AUTO: 0.03 K/UL — SIGNIFICANT CHANGE UP (ref 0–0.5)
EOSINOPHIL # BLD AUTO: 0.38 K/UL — SIGNIFICANT CHANGE UP (ref 0–0.5)
EOSINOPHIL NFR BLD AUTO: 0 % — SIGNIFICANT CHANGE UP (ref 0–6)
EOSINOPHIL NFR BLD AUTO: 0.1 % — SIGNIFICANT CHANGE UP (ref 0–6)
EOSINOPHIL NFR BLD AUTO: 0.3 % — SIGNIFICANT CHANGE UP (ref 0–6)
EOSINOPHIL NFR BLD AUTO: 1.9 % — SIGNIFICANT CHANGE UP (ref 0–6)
EPI CELLS # UR: 1 /HPF — SIGNIFICANT CHANGE UP
EPI CELLS # UR: 1 /HPF — SIGNIFICANT CHANGE UP
ESTIMATED AVERAGE GLUCOSE: 97 MG/DL — SIGNIFICANT CHANGE UP (ref 68–114)
ETHANOL SERPL-MCNC: SIGNIFICANT CHANGE UP MG/DL (ref 0–10)
FERRITIN SERPL-MCNC: 1032 NG/ML — HIGH (ref 15–150)
FERRITIN SERPL-MCNC: 1586 NG/ML — HIGH (ref 15–150)
FIBRINOGEN PPP-MCNC: 282 MG/DL — LOW (ref 290–520)
FOLATE SERPL-MCNC: >20 NG/ML — SIGNIFICANT CHANGE UP
GAS PNL BLDA: SIGNIFICANT CHANGE UP
GAS PNL BLDV: 118 MMOL/L — CRITICAL LOW (ref 136–145)
GAS PNL BLDV: 120 MMOL/L — CRITICAL LOW (ref 136–145)
GAS PNL BLDV: 125 MMOL/L — LOW (ref 136–145)
GAS PNL BLDV: 130 MMOL/L — LOW (ref 136–145)
GAS PNL BLDV: 133 MMOL/L — LOW (ref 136–145)
GAS PNL BLDV: 135 MMOL/L — LOW (ref 136–145)
GAS PNL BLDV: SIGNIFICANT CHANGE UP
GLUCOSE BLDC GLUCOMTR-MCNC: 142 MG/DL — HIGH (ref 70–99)
GLUCOSE BLDC GLUCOMTR-MCNC: 155 MG/DL — HIGH (ref 70–99)
GLUCOSE BLDC GLUCOMTR-MCNC: 164 MG/DL — HIGH (ref 70–99)
GLUCOSE BLDC GLUCOMTR-MCNC: 165 MG/DL — HIGH (ref 70–99)
GLUCOSE BLDC GLUCOMTR-MCNC: 183 MG/DL — HIGH (ref 70–99)
GLUCOSE BLDC GLUCOMTR-MCNC: 199 MG/DL — HIGH (ref 70–99)
GLUCOSE BLDC GLUCOMTR-MCNC: 200 MG/DL — HIGH (ref 70–99)
GLUCOSE BLDC GLUCOMTR-MCNC: 203 MG/DL — HIGH (ref 70–99)
GLUCOSE BLDC GLUCOMTR-MCNC: 207 MG/DL — HIGH (ref 70–99)
GLUCOSE BLDC GLUCOMTR-MCNC: 216 MG/DL — HIGH (ref 70–99)
GLUCOSE BLDC GLUCOMTR-MCNC: 217 MG/DL — HIGH (ref 70–99)
GLUCOSE BLDC GLUCOMTR-MCNC: 257 MG/DL — HIGH (ref 70–99)
GLUCOSE BLDV-MCNC: 186 MG/DL — HIGH (ref 70–99)
GLUCOSE BLDV-MCNC: 191 MG/DL — HIGH (ref 70–99)
GLUCOSE BLDV-MCNC: 211 MG/DL — HIGH (ref 70–99)
GLUCOSE BLDV-MCNC: 211 MG/DL — HIGH (ref 70–99)
GLUCOSE BLDV-MCNC: 214 MG/DL — HIGH (ref 70–99)
GLUCOSE BLDV-MCNC: 234 MG/DL — HIGH (ref 70–99)
GLUCOSE BLDV-MCNC: 236 MG/DL — HIGH (ref 70–99)
GLUCOSE BLDV-MCNC: 267 MG/DL — HIGH (ref 70–99)
GLUCOSE SERPL-MCNC: 124 MG/DL — HIGH (ref 70–99)
GLUCOSE SERPL-MCNC: 126 MG/DL — HIGH (ref 70–99)
GLUCOSE SERPL-MCNC: 135 MG/DL — HIGH (ref 70–99)
GLUCOSE SERPL-MCNC: 137 MG/DL — HIGH (ref 70–99)
GLUCOSE SERPL-MCNC: 144 MG/DL — HIGH (ref 70–99)
GLUCOSE SERPL-MCNC: 148 MG/DL — HIGH (ref 70–99)
GLUCOSE SERPL-MCNC: 148 MG/DL — HIGH (ref 70–99)
GLUCOSE SERPL-MCNC: 154 MG/DL — HIGH (ref 70–99)
GLUCOSE SERPL-MCNC: 156 MG/DL — HIGH (ref 70–99)
GLUCOSE SERPL-MCNC: 161 MG/DL — HIGH (ref 70–99)
GLUCOSE SERPL-MCNC: 162 MG/DL — HIGH (ref 70–99)
GLUCOSE SERPL-MCNC: 175 MG/DL — HIGH (ref 70–99)
GLUCOSE SERPL-MCNC: 178 MG/DL — HIGH (ref 70–99)
GLUCOSE SERPL-MCNC: 181 MG/DL — HIGH (ref 70–99)
GLUCOSE SERPL-MCNC: 181 MG/DL — HIGH (ref 70–99)
GLUCOSE SERPL-MCNC: 183 MG/DL — HIGH (ref 70–99)
GLUCOSE SERPL-MCNC: 184 MG/DL — HIGH (ref 70–99)
GLUCOSE SERPL-MCNC: 185 MG/DL — HIGH (ref 70–99)
GLUCOSE SERPL-MCNC: 186 MG/DL — HIGH (ref 70–99)
GLUCOSE SERPL-MCNC: 187 MG/DL — HIGH (ref 70–99)
GLUCOSE SERPL-MCNC: 189 MG/DL — HIGH (ref 70–99)
GLUCOSE SERPL-MCNC: 193 MG/DL — HIGH (ref 70–99)
GLUCOSE SERPL-MCNC: 196 MG/DL — HIGH (ref 70–99)
GLUCOSE SERPL-MCNC: 197 MG/DL — HIGH (ref 70–99)
GLUCOSE SERPL-MCNC: 198 MG/DL — HIGH (ref 70–99)
GLUCOSE SERPL-MCNC: 199 MG/DL — HIGH (ref 70–99)
GLUCOSE SERPL-MCNC: 201 MG/DL — HIGH (ref 70–99)
GLUCOSE SERPL-MCNC: 202 MG/DL — HIGH (ref 70–99)
GLUCOSE SERPL-MCNC: 204 MG/DL — HIGH (ref 70–99)
GLUCOSE SERPL-MCNC: 207 MG/DL — HIGH (ref 70–99)
GLUCOSE SERPL-MCNC: 208 MG/DL — HIGH (ref 70–99)
GLUCOSE SERPL-MCNC: 213 MG/DL — HIGH (ref 70–99)
GLUCOSE SERPL-MCNC: 214 MG/DL — HIGH (ref 70–99)
GLUCOSE SERPL-MCNC: 215 MG/DL — HIGH (ref 70–99)
GLUCOSE SERPL-MCNC: 215 MG/DL — HIGH (ref 70–99)
GLUCOSE SERPL-MCNC: 216 MG/DL — HIGH (ref 70–99)
GLUCOSE SERPL-MCNC: 216 MG/DL — HIGH (ref 70–99)
GLUCOSE SERPL-MCNC: 227 MG/DL — HIGH (ref 70–99)
GLUCOSE SERPL-MCNC: 233 MG/DL — HIGH (ref 70–99)
GLUCOSE SERPL-MCNC: 235 MG/DL — HIGH (ref 70–99)
GLUCOSE SERPL-MCNC: 238 MG/DL — HIGH (ref 70–99)
GLUCOSE SERPL-MCNC: 259 MG/DL — HIGH (ref 70–99)
GLUCOSE SERPL-MCNC: 262 MG/DL — HIGH (ref 70–99)
GLUCOSE SERPL-MCNC: 268 MG/DL — HIGH (ref 70–99)
GLUCOSE UR QL: ABNORMAL
GLUCOSE UR QL: NEGATIVE — SIGNIFICANT CHANGE UP
GRAM STN FLD: SIGNIFICANT CHANGE UP
HADV DNA SPEC QL NAA+PROBE: DETECTED
HAPTOGLOB SERPL-MCNC: 74 MG/DL — SIGNIFICANT CHANGE UP (ref 34–200)
HAPTOGLOB SERPL-MCNC: 81 MG/DL — SIGNIFICANT CHANGE UP (ref 34–200)
HAV IGM SER-ACNC: SIGNIFICANT CHANGE UP
HBV CORE IGM SER-ACNC: SIGNIFICANT CHANGE UP
HBV SURFACE AG SER-ACNC: SIGNIFICANT CHANGE UP
HCO3 BLDV-SCNC: 15 MMOL/L — LOW (ref 22–29)
HCO3 BLDV-SCNC: 15 MMOL/L — LOW (ref 22–29)
HCO3 BLDV-SCNC: 16 MMOL/L — LOW (ref 22–29)
HCO3 BLDV-SCNC: 18 MMOL/L — LOW (ref 22–29)
HCO3 BLDV-SCNC: 18 MMOL/L — LOW (ref 22–29)
HCO3 BLDV-SCNC: 21 MMOL/L — LOW (ref 22–29)
HCT VFR BLD CALC: 14.4 % — CRITICAL LOW (ref 34.5–45)
HCT VFR BLD CALC: 20.2 % — CRITICAL LOW (ref 34.5–45)
HCT VFR BLD CALC: 20.5 % — CRITICAL LOW (ref 34.5–45)
HCT VFR BLD CALC: 20.5 % — CRITICAL LOW (ref 34.5–45)
HCT VFR BLD CALC: 21.1 % — LOW (ref 34.5–45)
HCT VFR BLD CALC: 21.3 % — LOW (ref 34.5–45)
HCT VFR BLD CALC: 21.4 % — LOW (ref 34.5–45)
HCT VFR BLD CALC: 21.4 % — LOW (ref 34.5–45)
HCT VFR BLD CALC: 22.2 % — LOW (ref 34.5–45)
HCT VFR BLD CALC: 22.5 % — LOW (ref 34.5–45)
HCT VFR BLD CALC: 22.6 % — LOW (ref 34.5–45)
HCT VFR BLD CALC: 22.7 % — LOW (ref 34.5–45)
HCT VFR BLD CALC: 22.8 % — LOW (ref 34.5–45)
HCT VFR BLD CALC: 22.9 % — LOW (ref 34.5–45)
HCT VFR BLD CALC: 25.1 % — LOW (ref 34.5–45)
HCT VFR BLD CALC: 25.1 % — LOW (ref 34.5–45)
HCT VFR BLD CALC: 26 % — LOW (ref 34.5–45)
HCT VFR BLD CALC: 27.7 % — LOW (ref 34.5–45)
HCT VFR BLDA CALC: 19 % — CRITICAL LOW (ref 34.5–46.5)
HCT VFR BLDA CALC: 20 % — CRITICAL LOW (ref 34.5–46.5)
HCT VFR BLDA CALC: 23 % — LOW (ref 34.5–46.5)
HCT VFR BLDA CALC: 24 % — LOW (ref 34.5–46.5)
HCT VFR BLDA CALC: 24 % — LOW (ref 34.5–46.5)
HCT VFR BLDA CALC: 26 % — LOW (ref 34.5–46.5)
HCT VFR BLDA CALC: 28 % — LOW (ref 34.5–46.5)
HCT VFR BLDA CALC: 34 % — LOW (ref 34.5–46.5)
HCV AB S/CO SERPL IA: 1.08 S/CO — HIGH (ref 0–0.99)
HCV AB S/CO SERPL IA: 1.21 S/CO — HIGH (ref 0–0.99)
HCV AB SERPL-IMP: ABNORMAL
HCV AB SERPL-IMP: ABNORMAL
HCV RNA FLD QL NAA+PROBE: SIGNIFICANT CHANGE UP
HCV RNA FLD QL NAA+PROBE: SIGNIFICANT CHANGE UP
HEPARIN-PF4 AB RESULT: SIGNIFICANT CHANGE UP (ref 0–0.9)
HGB BLD CALC-MCNC: 11.2 G/DL — LOW (ref 11.7–16.1)
HGB BLD CALC-MCNC: 6.3 G/DL — CRITICAL LOW (ref 11.7–16.1)
HGB BLD CALC-MCNC: 6.6 G/DL — CRITICAL LOW (ref 11.7–16.1)
HGB BLD CALC-MCNC: 7.8 G/DL — LOW (ref 11.7–16.1)
HGB BLD CALC-MCNC: 7.9 G/DL — LOW (ref 11.7–16.1)
HGB BLD CALC-MCNC: 8.1 G/DL — LOW (ref 11.7–16.1)
HGB BLD CALC-MCNC: 8.8 G/DL — LOW (ref 11.7–16.1)
HGB BLD CALC-MCNC: 9.2 G/DL — LOW (ref 11.7–16.1)
HGB BLD-MCNC: 10.1 G/DL — LOW (ref 11.5–15.5)
HGB BLD-MCNC: 5.3 G/DL — CRITICAL LOW (ref 11.5–15.5)
HGB BLD-MCNC: 6.7 G/DL — CRITICAL LOW (ref 11.5–15.5)
HGB BLD-MCNC: 6.8 G/DL — CRITICAL LOW (ref 11.5–15.5)
HGB BLD-MCNC: 6.9 G/DL — CRITICAL LOW (ref 11.5–15.5)
HGB BLD-MCNC: 7.1 G/DL — LOW (ref 11.5–15.5)
HGB BLD-MCNC: 7.2 G/DL — LOW (ref 11.5–15.5)
HGB BLD-MCNC: 7.3 G/DL — LOW (ref 11.5–15.5)
HGB BLD-MCNC: 7.4 G/DL — LOW (ref 11.5–15.5)
HGB BLD-MCNC: 7.5 G/DL — LOW (ref 11.5–15.5)
HGB BLD-MCNC: 7.7 G/DL — LOW (ref 11.5–15.5)
HGB BLD-MCNC: 8.2 G/DL — LOW (ref 11.5–15.5)
HGB BLD-MCNC: 8.3 G/DL — LOW (ref 11.5–15.5)
HGB BLD-MCNC: 8.5 G/DL — LOW (ref 11.5–15.5)
HGB BLD-MCNC: 9.3 G/DL — LOW (ref 11.5–15.5)
HOROWITZ INDEX BLDV+IHG-RTO: 21 — SIGNIFICANT CHANGE UP
HOWELL-JOLLY BOD BLD QL SMEAR: PRESENT — SIGNIFICANT CHANGE UP
HYALINE CASTS # UR AUTO: 3 /LPF — HIGH (ref 0–2)
HYALINE CASTS # UR AUTO: 9 /LPF — HIGH (ref 0–2)
IGG SERPL-MCNC: 1072 MG/DL — SIGNIFICANT CHANGE UP (ref 586–1602)
IGG1 SER-MCNC: 508 MG/DL — SIGNIFICANT CHANGE UP (ref 248–810)
IGG2 SER-MCNC: 387 MG/DL — SIGNIFICANT CHANGE UP (ref 130–555)
IGG3 SER-MCNC: 32 MG/DL — SIGNIFICANT CHANGE UP (ref 15–102)
IGG4 SER-MCNC: 42 MG/DL — SIGNIFICANT CHANGE UP (ref 2–96)
IMM GRANULOCYTES NFR BLD AUTO: 0.8 % — SIGNIFICANT CHANGE UP (ref 0–1.5)
IMM GRANULOCYTES NFR BLD AUTO: 1 % — SIGNIFICANT CHANGE UP (ref 0–1.5)
IMM GRANULOCYTES NFR BLD AUTO: 1 % — SIGNIFICANT CHANGE UP (ref 0–1.5)
INR BLD: 1.42 RATIO — HIGH (ref 0.88–1.16)
INR BLD: 1.45 RATIO — HIGH (ref 0.88–1.16)
INR BLD: 1.69 RATIO — HIGH (ref 0.88–1.16)
INR BLD: 1.93 RATIO — HIGH (ref 0.88–1.16)
INR BLD: 2.18 RATIO — HIGH (ref 0.88–1.16)
INR BLD: 2.25 RATIO — HIGH (ref 0.88–1.16)
INR BLD: 2.37 RATIO — HIGH (ref 0.88–1.16)
INR BLD: 2.77 RATIO — HIGH (ref 0.88–1.16)
IRON SATN MFR SERPL: 119 UG/DL — SIGNIFICANT CHANGE UP (ref 30–160)
IRON SATN MFR SERPL: 75 % — HIGH (ref 14–50)
KETONES UR-MCNC: ABNORMAL
KETONES UR-MCNC: NEGATIVE — SIGNIFICANT CHANGE UP
LACTATE BLDV-MCNC: 1.8 MMOL/L — SIGNIFICANT CHANGE UP (ref 0.7–2)
LACTATE BLDV-MCNC: 2.1 MMOL/L — HIGH (ref 0.7–2)
LACTATE BLDV-MCNC: 2.1 MMOL/L — HIGH (ref 0.7–2)
LACTATE BLDV-MCNC: 3.3 MMOL/L — HIGH (ref 0.7–2)
LACTATE BLDV-MCNC: 3.5 MMOL/L — HIGH (ref 0.7–2)
LACTATE BLDV-MCNC: 3.5 MMOL/L — HIGH (ref 0.7–2)
LACTATE BLDV-MCNC: 3.7 MMOL/L — HIGH (ref 0.7–2)
LACTATE BLDV-MCNC: 4.2 MMOL/L — CRITICAL HIGH (ref 0.7–2)
LACTATE BLDV-MCNC: 4.4 MMOL/L — CRITICAL HIGH (ref 0.7–2)
LACTATE SERPL-SCNC: 3.7 MMOL/L — HIGH (ref 0.7–2)
LACTATE SERPL-SCNC: 8.1 MMOL/L — CRITICAL HIGH (ref 0.7–2)
LDH SERPL L TO P-CCNC: 286 U/L — HIGH (ref 50–242)
LDH SERPL L TO P-CCNC: 320 U/L — HIGH (ref 50–242)
LEUKOCYTE ESTERASE UR-ACNC: ABNORMAL
LEUKOCYTE ESTERASE UR-ACNC: ABNORMAL
LKM AB SER-ACNC: <20.1 UNITS — SIGNIFICANT CHANGE UP (ref 0–20)
LYMPHOCYTES # BLD AUTO: 0.73 K/UL — LOW (ref 1–3.3)
LYMPHOCYTES # BLD AUTO: 1.02 K/UL — SIGNIFICANT CHANGE UP (ref 1–3.3)
LYMPHOCYTES # BLD AUTO: 1.07 K/UL — SIGNIFICANT CHANGE UP (ref 1–3.3)
LYMPHOCYTES # BLD AUTO: 1.2 K/UL — SIGNIFICANT CHANGE UP (ref 1–3.3)
LYMPHOCYTES # BLD AUTO: 4.3 % — LOW (ref 13–44)
LYMPHOCYTES # BLD AUTO: 4.9 % — LOW (ref 13–44)
LYMPHOCYTES # BLD AUTO: 5.2 % — LOW (ref 13–44)
LYMPHOCYTES # BLD AUTO: 6.1 % — LOW (ref 13–44)
MACROCYTES BLD QL: SIGNIFICANT CHANGE UP
MAGNESIUM SERPL-MCNC: 2.1 MG/DL — SIGNIFICANT CHANGE UP (ref 1.6–2.6)
MAGNESIUM SERPL-MCNC: 2.2 MG/DL — SIGNIFICANT CHANGE UP (ref 1.6–2.6)
MAGNESIUM SERPL-MCNC: 2.3 MG/DL — SIGNIFICANT CHANGE UP (ref 1.6–2.6)
MAGNESIUM SERPL-MCNC: 2.4 MG/DL — SIGNIFICANT CHANGE UP (ref 1.6–2.6)
MAGNESIUM SERPL-MCNC: 2.5 MG/DL — SIGNIFICANT CHANGE UP (ref 1.6–2.6)
MAGNESIUM SERPL-MCNC: 2.5 MG/DL — SIGNIFICANT CHANGE UP (ref 1.6–2.6)
MAGNESIUM SERPL-MCNC: 2.6 MG/DL — SIGNIFICANT CHANGE UP (ref 1.6–2.6)
MAGNESIUM SERPL-MCNC: 2.7 MG/DL — HIGH (ref 1.6–2.6)
MAGNESIUM SERPL-MCNC: 2.8 MG/DL — HIGH (ref 1.6–2.6)
MAGNESIUM SERPL-MCNC: 2.9 MG/DL — HIGH (ref 1.6–2.6)
MAGNESIUM SERPL-MCNC: 3 MG/DL — HIGH (ref 1.6–2.6)
MAGNESIUM SERPL-MCNC: 3 MG/DL — HIGH (ref 1.6–2.6)
MAGNESIUM UR-MCNC: 4.3 MG/DL — SIGNIFICANT CHANGE UP
MANUAL SMEAR VERIFICATION: SIGNIFICANT CHANGE UP
MCHC RBC-ENTMCNC: 32.2 GM/DL — SIGNIFICANT CHANGE UP (ref 32–36)
MCHC RBC-ENTMCNC: 32.2 GM/DL — SIGNIFICANT CHANGE UP (ref 32–36)
MCHC RBC-ENTMCNC: 32.3 GM/DL — SIGNIFICANT CHANGE UP (ref 32–36)
MCHC RBC-ENTMCNC: 32.9 GM/DL — SIGNIFICANT CHANGE UP (ref 32–36)
MCHC RBC-ENTMCNC: 32.9 GM/DL — SIGNIFICANT CHANGE UP (ref 32–36)
MCHC RBC-ENTMCNC: 33.1 GM/DL — SIGNIFICANT CHANGE UP (ref 32–36)
MCHC RBC-ENTMCNC: 33.2 GM/DL — SIGNIFICANT CHANGE UP (ref 32–36)
MCHC RBC-ENTMCNC: 33.3 GM/DL — SIGNIFICANT CHANGE UP (ref 32–36)
MCHC RBC-ENTMCNC: 33.6 GM/DL — SIGNIFICANT CHANGE UP (ref 32–36)
MCHC RBC-ENTMCNC: 33.8 GM/DL — SIGNIFICANT CHANGE UP (ref 32–36)
MCHC RBC-ENTMCNC: 33.8 GM/DL — SIGNIFICANT CHANGE UP (ref 32–36)
MCHC RBC-ENTMCNC: 33.9 GM/DL — SIGNIFICANT CHANGE UP (ref 32–36)
MCHC RBC-ENTMCNC: 34.3 GM/DL — SIGNIFICANT CHANGE UP (ref 32–36)
MCHC RBC-ENTMCNC: 34.4 PG — HIGH (ref 27–34)
MCHC RBC-ENTMCNC: 34.6 PG — HIGH (ref 27–34)
MCHC RBC-ENTMCNC: 34.6 PG — HIGH (ref 27–34)
MCHC RBC-ENTMCNC: 34.8 PG — HIGH (ref 27–34)
MCHC RBC-ENTMCNC: 35 PG — HIGH (ref 27–34)
MCHC RBC-ENTMCNC: 35.1 GM/DL — SIGNIFICANT CHANGE UP (ref 32–36)
MCHC RBC-ENTMCNC: 35.1 GM/DL — SIGNIFICANT CHANGE UP (ref 32–36)
MCHC RBC-ENTMCNC: 35.3 PG — HIGH (ref 27–34)
MCHC RBC-ENTMCNC: 35.8 GM/DL — SIGNIFICANT CHANGE UP (ref 32–36)
MCHC RBC-ENTMCNC: 36 PG — HIGH (ref 27–34)
MCHC RBC-ENTMCNC: 36 PG — HIGH (ref 27–34)
MCHC RBC-ENTMCNC: 36.1 PG — HIGH (ref 27–34)
MCHC RBC-ENTMCNC: 36.2 PG — HIGH (ref 27–34)
MCHC RBC-ENTMCNC: 36.2 PG — HIGH (ref 27–34)
MCHC RBC-ENTMCNC: 36.4 PG — HIGH (ref 27–34)
MCHC RBC-ENTMCNC: 36.5 GM/DL — HIGH (ref 32–36)
MCHC RBC-ENTMCNC: 36.5 PG — HIGH (ref 27–34)
MCHC RBC-ENTMCNC: 36.5 PG — HIGH (ref 27–34)
MCHC RBC-ENTMCNC: 36.6 PG — HIGH (ref 27–34)
MCHC RBC-ENTMCNC: 36.8 GM/DL — HIGH (ref 32–36)
MCHC RBC-ENTMCNC: 36.8 PG — HIGH (ref 27–34)
MCHC RBC-ENTMCNC: 36.9 GM/DL — HIGH (ref 32–36)
MCHC RBC-ENTMCNC: 36.9 PG — HIGH (ref 27–34)
MCHC RBC-ENTMCNC: 37.1 PG — HIGH (ref 27–34)
MCHC RBC-ENTMCNC: 37.1 PG — HIGH (ref 27–34)
MCHC RBC-ENTMCNC: 37.3 PG — HIGH (ref 27–34)
MCHC RBC-ENTMCNC: 37.9 PG — HIGH (ref 27–34)
MCV RBC AUTO: 100.5 FL — HIGH (ref 80–100)
MCV RBC AUTO: 101.6 FL — HIGH (ref 80–100)
MCV RBC AUTO: 101.8 FL — HIGH (ref 80–100)
MCV RBC AUTO: 102.4 FL — HIGH (ref 80–100)
MCV RBC AUTO: 102.9 FL — HIGH (ref 80–100)
MCV RBC AUTO: 104.4 FL — HIGH (ref 80–100)
MCV RBC AUTO: 105 FL — HIGH (ref 80–100)
MCV RBC AUTO: 105.1 FL — HIGH (ref 80–100)
MCV RBC AUTO: 105.7 FL — HIGH (ref 80–100)
MCV RBC AUTO: 106.2 FL — HIGH (ref 80–100)
MCV RBC AUTO: 106.5 FL — HIGH (ref 80–100)
MCV RBC AUTO: 106.8 FL — HIGH (ref 80–100)
MCV RBC AUTO: 107.1 FL — HIGH (ref 80–100)
MCV RBC AUTO: 107.2 FL — HIGH (ref 80–100)
MCV RBC AUTO: 108.5 FL — HIGH (ref 80–100)
MCV RBC AUTO: 108.5 FL — HIGH (ref 80–100)
MCV RBC AUTO: 109.4 FL — HIGH (ref 80–100)
MCV RBC AUTO: 109.8 FL — HIGH (ref 80–100)
MCV RBC AUTO: 110.1 FL — HIGH (ref 80–100)
MCV RBC AUTO: 111.8 FL — HIGH (ref 80–100)
MCV RBC AUTO: 112 FL — HIGH (ref 80–100)
MELD SCORE WITH DIALYSIS: >40 POINTS — SIGNIFICANT CHANGE UP
MELD SCORE WITHOUT DIALYSIS: >40 POINTS — SIGNIFICANT CHANGE UP
METHOD TYPE: SIGNIFICANT CHANGE UP
MONOCYTES # BLD AUTO: 0.6 K/UL — SIGNIFICANT CHANGE UP (ref 0–0.9)
MONOCYTES # BLD AUTO: 0.66 K/UL — SIGNIFICANT CHANGE UP (ref 0–0.9)
MONOCYTES # BLD AUTO: 0.87 K/UL — SIGNIFICANT CHANGE UP (ref 0–0.9)
MONOCYTES # BLD AUTO: 1.48 K/UL — HIGH (ref 0–0.9)
MONOCYTES NFR BLD AUTO: 3.3 % — SIGNIFICANT CHANGE UP (ref 2–14)
MONOCYTES NFR BLD AUTO: 3.5 % — SIGNIFICANT CHANGE UP (ref 2–14)
MONOCYTES NFR BLD AUTO: 5 % — SIGNIFICANT CHANGE UP (ref 2–14)
MONOCYTES NFR BLD AUTO: 6.1 % — SIGNIFICANT CHANGE UP (ref 2–14)
MRSA PCR RESULT.: SIGNIFICANT CHANGE UP
NEUTROPHILS # BLD AUTO: 10.41 K/UL — HIGH (ref 1.8–7.4)
NEUTROPHILS # BLD AUTO: 17.5 K/UL — HIGH (ref 1.8–7.4)
NEUTROPHILS # BLD AUTO: 21.32 K/UL — HIGH (ref 1.8–7.4)
NEUTROPHILS # BLD AUTO: 22.97 K/UL — HIGH (ref 1.8–7.4)
NEUTROPHILS NFR BLD AUTO: 87.6 % — HIGH (ref 43–77)
NEUTROPHILS NFR BLD AUTO: 87.8 % — HIGH (ref 43–77)
NEUTROPHILS NFR BLD AUTO: 88.5 % — HIGH (ref 43–77)
NEUTROPHILS NFR BLD AUTO: 92.2 % — HIGH (ref 43–77)
NITRITE UR-MCNC: NEGATIVE — SIGNIFICANT CHANGE UP
NITRITE UR-MCNC: NEGATIVE — SIGNIFICANT CHANGE UP
NRBC # BLD: 0 /100 WBCS — SIGNIFICANT CHANGE UP (ref 0–0)
OB PNL STL: POSITIVE
ORGANISM # SPEC MICROSCOPIC CNT: SIGNIFICANT CHANGE UP
OSMOLALITY SERPL: 317 MOSMOL/KG — HIGH (ref 275–300)
OSMOLALITY UR: 340 MOS/KG — SIGNIFICANT CHANGE UP (ref 300–900)
OTHER CELLS CSF MANUAL: 8.1 ML/DL — LOW (ref 18–22)
PCO2 BLDV: 29 MMHG — LOW (ref 39–42)
PCO2 BLDV: 31 MMHG — LOW (ref 39–42)
PCO2 BLDV: 31 MMHG — LOW (ref 39–42)
PCO2 BLDV: 32 MMHG — LOW (ref 39–42)
PCO2 BLDV: 33 MMHG — LOW (ref 39–42)
PCO2 BLDV: 34 MMHG — LOW (ref 39–42)
PCO2 BLDV: 34 MMHG — LOW (ref 39–42)
PCO2 BLDV: 35 MMHG — LOW (ref 39–42)
PF4 HEPARIN CMPLX AB SER-ACNC: NEGATIVE — SIGNIFICANT CHANGE UP
PH BLDV: 7.28 — LOW (ref 7.32–7.43)
PH BLDV: 7.29 — LOW (ref 7.32–7.43)
PH BLDV: 7.31 — LOW (ref 7.32–7.43)
PH BLDV: 7.31 — LOW (ref 7.32–7.43)
PH BLDV: 7.32 — SIGNIFICANT CHANGE UP (ref 7.32–7.43)
PH BLDV: 7.33 — SIGNIFICANT CHANGE UP (ref 7.32–7.43)
PH BLDV: 7.34 — SIGNIFICANT CHANGE UP (ref 7.32–7.43)
PH BLDV: 7.34 — SIGNIFICANT CHANGE UP (ref 7.32–7.43)
PH BLDV: 7.36 — SIGNIFICANT CHANGE UP (ref 7.32–7.43)
PH BLDV: 7.39 — SIGNIFICANT CHANGE UP (ref 7.32–7.43)
PH UR: 5.5 — SIGNIFICANT CHANGE UP (ref 5–8)
PH UR: 5.5 — SIGNIFICANT CHANGE UP (ref 5–8)
PHOSPHATE SERPL-MCNC: 1.5 MG/DL — LOW (ref 2.5–4.5)
PHOSPHATE SERPL-MCNC: 1.8 MG/DL — LOW (ref 2.5–4.5)
PHOSPHATE SERPL-MCNC: 1.8 MG/DL — LOW (ref 2.5–4.5)
PHOSPHATE SERPL-MCNC: 10.2 MG/DL — HIGH (ref 2.5–4.5)
PHOSPHATE SERPL-MCNC: 11.4 MG/DL — HIGH (ref 2.5–4.5)
PHOSPHATE SERPL-MCNC: 2 MG/DL — LOW (ref 2.5–4.5)
PHOSPHATE SERPL-MCNC: 2 MG/DL — LOW (ref 2.5–4.5)
PHOSPHATE SERPL-MCNC: 2.1 MG/DL — LOW (ref 2.5–4.5)
PHOSPHATE SERPL-MCNC: 2.1 MG/DL — LOW (ref 2.5–4.5)
PHOSPHATE SERPL-MCNC: 2.4 MG/DL — LOW (ref 2.5–4.5)
PHOSPHATE SERPL-MCNC: 2.6 MG/DL — SIGNIFICANT CHANGE UP (ref 2.5–4.5)
PHOSPHATE SERPL-MCNC: 2.6 MG/DL — SIGNIFICANT CHANGE UP (ref 2.5–4.5)
PHOSPHATE SERPL-MCNC: 2.7 MG/DL — SIGNIFICANT CHANGE UP (ref 2.5–4.5)
PHOSPHATE SERPL-MCNC: 2.7 MG/DL — SIGNIFICANT CHANGE UP (ref 2.5–4.5)
PHOSPHATE SERPL-MCNC: 3 MG/DL — SIGNIFICANT CHANGE UP (ref 2.5–4.5)
PHOSPHATE SERPL-MCNC: 3.1 MG/DL — SIGNIFICANT CHANGE UP (ref 2.5–4.5)
PHOSPHATE SERPL-MCNC: 3.2 MG/DL — SIGNIFICANT CHANGE UP (ref 2.5–4.5)
PHOSPHATE SERPL-MCNC: 3.3 MG/DL — SIGNIFICANT CHANGE UP (ref 2.5–4.5)
PHOSPHATE SERPL-MCNC: 3.3 MG/DL — SIGNIFICANT CHANGE UP (ref 2.5–4.5)
PHOSPHATE SERPL-MCNC: 3.4 MG/DL — SIGNIFICANT CHANGE UP (ref 2.5–4.5)
PHOSPHATE SERPL-MCNC: 4.1 MG/DL — SIGNIFICANT CHANGE UP (ref 2.5–4.5)
PHOSPHATE SERPL-MCNC: 4.3 MG/DL — SIGNIFICANT CHANGE UP (ref 2.5–4.5)
PHOSPHATE SERPL-MCNC: 5.1 MG/DL — HIGH (ref 2.5–4.5)
PHOSPHATE SERPL-MCNC: 5.3 MG/DL — HIGH (ref 2.5–4.5)
PHOSPHATE SERPL-MCNC: 5.5 MG/DL — HIGH (ref 2.5–4.5)
PHOSPHATE SERPL-MCNC: 5.7 MG/DL — HIGH (ref 2.5–4.5)
PHOSPHATE SERPL-MCNC: 5.7 MG/DL — HIGH (ref 2.5–4.5)
PHOSPHATE SERPL-MCNC: 6 MG/DL — HIGH (ref 2.5–4.5)
PHOSPHATE SERPL-MCNC: 6.2 MG/DL — HIGH (ref 2.5–4.5)
PHOSPHATE SERPL-MCNC: 6.2 MG/DL — HIGH (ref 2.5–4.5)
PHOSPHATE SERPL-MCNC: 6.4 MG/DL — HIGH (ref 2.5–4.5)
PHOSPHATE SERPL-MCNC: 6.4 MG/DL — HIGH (ref 2.5–4.5)
PHOSPHATE SERPL-MCNC: 7.7 MG/DL — HIGH (ref 2.5–4.5)
PHOSPHATE SERPL-MCNC: 9.1 MG/DL — SIGNIFICANT CHANGE UP (ref 2.5–4.5)
PHOSPHATIDYLETHANOL (PETH) - RESULT: 107 NG/ML — HIGH
PLAT MORPH BLD: NORMAL — SIGNIFICANT CHANGE UP
PLATELET # BLD AUTO: 110 K/UL — LOW (ref 150–400)
PLATELET # BLD AUTO: 117 K/UL — LOW (ref 150–400)
PLATELET # BLD AUTO: 118 K/UL — LOW (ref 150–400)
PLATELET # BLD AUTO: 125 K/UL — LOW (ref 150–400)
PLATELET # BLD AUTO: 129 K/UL — LOW (ref 150–400)
PLATELET # BLD AUTO: 129 K/UL — LOW (ref 150–400)
PLATELET # BLD AUTO: 138 K/UL — LOW (ref 150–400)
PLATELET # BLD AUTO: 144 K/UL — LOW (ref 150–400)
PLATELET # BLD AUTO: 154 K/UL — SIGNIFICANT CHANGE UP (ref 150–400)
PLATELET # BLD AUTO: 35 K/UL — LOW (ref 150–400)
PLATELET # BLD AUTO: 35 K/UL — LOW (ref 150–400)
PLATELET # BLD AUTO: 47 K/UL — LOW (ref 150–400)
PLATELET # BLD AUTO: 49 K/UL — LOW (ref 150–400)
PLATELET # BLD AUTO: 50 K/UL — LOW (ref 150–400)
PLATELET # BLD AUTO: 64 K/UL — LOW (ref 150–400)
PLATELET # BLD AUTO: 67 K/UL — LOW (ref 150–400)
PLATELET # BLD AUTO: 71 K/UL — LOW (ref 150–400)
PLATELET # BLD AUTO: 71 K/UL — LOW (ref 150–400)
PLATELET # BLD AUTO: 86 K/UL — LOW (ref 150–400)
PLATELET # BLD AUTO: 90 K/UL — LOW (ref 150–400)
PLATELET # BLD AUTO: 98 K/UL — LOW (ref 150–400)
PO2 BLDV: 37 MMHG — SIGNIFICANT CHANGE UP (ref 25–45)
PO2 BLDV: 41 MMHG — SIGNIFICANT CHANGE UP (ref 25–45)
PO2 BLDV: 44 MMHG — SIGNIFICANT CHANGE UP (ref 25–45)
PO2 BLDV: 44 MMHG — SIGNIFICANT CHANGE UP (ref 25–45)
PO2 BLDV: 45 MMHG — SIGNIFICANT CHANGE UP (ref 25–45)
PO2 BLDV: 47 MMHG — HIGH (ref 25–45)
PO2 BLDV: 50 MMHG — HIGH (ref 25–45)
PO2 BLDV: 53 MMHG — HIGH (ref 25–45)
PO2 BLDV: 58 MMHG — HIGH (ref 25–45)
PO2 BLDV: 58 MMHG — HIGH (ref 25–45)
POLYCHROMASIA BLD QL SMEAR: SLIGHT — SIGNIFICANT CHANGE UP
POTASSIUM BLDV-SCNC: 2.6 MMOL/L — CRITICAL LOW (ref 3.5–5.1)
POTASSIUM BLDV-SCNC: 3 MMOL/L — LOW (ref 3.5–5.1)
POTASSIUM BLDV-SCNC: 3.3 MMOL/L — LOW (ref 3.5–5.1)
POTASSIUM BLDV-SCNC: 3.9 MMOL/L — SIGNIFICANT CHANGE UP (ref 3.5–5.1)
POTASSIUM BLDV-SCNC: 5.4 MMOL/L — HIGH (ref 3.5–5.1)
POTASSIUM BLDV-SCNC: 5.5 MMOL/L — HIGH (ref 3.5–5.1)
POTASSIUM BLDV-SCNC: 5.7 MMOL/L — HIGH (ref 3.5–5.1)
POTASSIUM BLDV-SCNC: 5.8 MMOL/L — HIGH (ref 3.5–5.1)
POTASSIUM SERPL-MCNC: 3.1 MMOL/L — LOW (ref 3.5–5.3)
POTASSIUM SERPL-MCNC: 3.1 MMOL/L — LOW (ref 3.5–5.3)
POTASSIUM SERPL-MCNC: 3.2 MMOL/L — LOW (ref 3.5–5.3)
POTASSIUM SERPL-MCNC: 3.3 MMOL/L — LOW (ref 3.5–5.3)
POTASSIUM SERPL-MCNC: 3.4 MMOL/L — LOW (ref 3.5–5.3)
POTASSIUM SERPL-MCNC: 3.5 MMOL/L — SIGNIFICANT CHANGE UP (ref 3.5–5.3)
POTASSIUM SERPL-MCNC: 3.6 MMOL/L — SIGNIFICANT CHANGE UP (ref 3.5–5.3)
POTASSIUM SERPL-MCNC: 3.6 MMOL/L — SIGNIFICANT CHANGE UP (ref 3.5–5.3)
POTASSIUM SERPL-MCNC: 3.7 MMOL/L — SIGNIFICANT CHANGE UP (ref 3.5–5.3)
POTASSIUM SERPL-MCNC: 3.8 MMOL/L — SIGNIFICANT CHANGE UP (ref 3.5–5.3)
POTASSIUM SERPL-MCNC: 3.8 MMOL/L — SIGNIFICANT CHANGE UP (ref 3.5–5.3)
POTASSIUM SERPL-MCNC: 4 MMOL/L — SIGNIFICANT CHANGE UP (ref 3.5–5.3)
POTASSIUM SERPL-MCNC: 4 MMOL/L — SIGNIFICANT CHANGE UP (ref 3.5–5.3)
POTASSIUM SERPL-MCNC: 4.1 MMOL/L — SIGNIFICANT CHANGE UP (ref 3.5–5.3)
POTASSIUM SERPL-MCNC: 4.2 MMOL/L — SIGNIFICANT CHANGE UP (ref 3.5–5.3)
POTASSIUM SERPL-MCNC: 4.4 MMOL/L — SIGNIFICANT CHANGE UP (ref 3.5–5.3)
POTASSIUM SERPL-MCNC: 4.5 MMOL/L — SIGNIFICANT CHANGE UP (ref 3.5–5.3)
POTASSIUM SERPL-MCNC: 4.7 MMOL/L — SIGNIFICANT CHANGE UP (ref 3.5–5.3)
POTASSIUM SERPL-MCNC: 4.8 MMOL/L — SIGNIFICANT CHANGE UP (ref 3.5–5.3)
POTASSIUM SERPL-MCNC: 4.8 MMOL/L — SIGNIFICANT CHANGE UP (ref 3.5–5.3)
POTASSIUM SERPL-MCNC: 4.9 MMOL/L — SIGNIFICANT CHANGE UP (ref 3.5–5.3)
POTASSIUM SERPL-MCNC: 5 MMOL/L — SIGNIFICANT CHANGE UP (ref 3.5–5.3)
POTASSIUM SERPL-MCNC: 5.1 MMOL/L — SIGNIFICANT CHANGE UP (ref 3.5–5.3)
POTASSIUM SERPL-MCNC: 5.2 MMOL/L — SIGNIFICANT CHANGE UP (ref 3.5–5.3)
POTASSIUM SERPL-MCNC: 5.3 MMOL/L — SIGNIFICANT CHANGE UP (ref 3.5–5.3)
POTASSIUM SERPL-MCNC: 5.5 MMOL/L — HIGH (ref 3.5–5.3)
POTASSIUM SERPL-MCNC: 5.7 MMOL/L — HIGH (ref 3.5–5.3)
POTASSIUM SERPL-MCNC: 5.7 MMOL/L — HIGH (ref 3.5–5.3)
POTASSIUM SERPL-MCNC: 6 MMOL/L — HIGH (ref 3.5–5.3)
POTASSIUM SERPL-SCNC: 3.1 MMOL/L — LOW (ref 3.5–5.3)
POTASSIUM SERPL-SCNC: 3.1 MMOL/L — LOW (ref 3.5–5.3)
POTASSIUM SERPL-SCNC: 3.2 MMOL/L — LOW (ref 3.5–5.3)
POTASSIUM SERPL-SCNC: 3.3 MMOL/L — LOW (ref 3.5–5.3)
POTASSIUM SERPL-SCNC: 3.4 MMOL/L — LOW (ref 3.5–5.3)
POTASSIUM SERPL-SCNC: 3.5 MMOL/L — SIGNIFICANT CHANGE UP (ref 3.5–5.3)
POTASSIUM SERPL-SCNC: 3.6 MMOL/L — SIGNIFICANT CHANGE UP (ref 3.5–5.3)
POTASSIUM SERPL-SCNC: 3.6 MMOL/L — SIGNIFICANT CHANGE UP (ref 3.5–5.3)
POTASSIUM SERPL-SCNC: 3.7 MMOL/L — SIGNIFICANT CHANGE UP (ref 3.5–5.3)
POTASSIUM SERPL-SCNC: 3.8 MMOL/L — SIGNIFICANT CHANGE UP (ref 3.5–5.3)
POTASSIUM SERPL-SCNC: 3.8 MMOL/L — SIGNIFICANT CHANGE UP (ref 3.5–5.3)
POTASSIUM SERPL-SCNC: 4 MMOL/L — SIGNIFICANT CHANGE UP (ref 3.5–5.3)
POTASSIUM SERPL-SCNC: 4 MMOL/L — SIGNIFICANT CHANGE UP (ref 3.5–5.3)
POTASSIUM SERPL-SCNC: 4.1 MMOL/L — SIGNIFICANT CHANGE UP (ref 3.5–5.3)
POTASSIUM SERPL-SCNC: 4.2 MMOL/L — SIGNIFICANT CHANGE UP (ref 3.5–5.3)
POTASSIUM SERPL-SCNC: 4.4 MMOL/L — SIGNIFICANT CHANGE UP (ref 3.5–5.3)
POTASSIUM SERPL-SCNC: 4.5 MMOL/L — SIGNIFICANT CHANGE UP (ref 3.5–5.3)
POTASSIUM SERPL-SCNC: 4.7 MMOL/L — SIGNIFICANT CHANGE UP (ref 3.5–5.3)
POTASSIUM SERPL-SCNC: 4.8 MMOL/L — SIGNIFICANT CHANGE UP (ref 3.5–5.3)
POTASSIUM SERPL-SCNC: 4.8 MMOL/L — SIGNIFICANT CHANGE UP (ref 3.5–5.3)
POTASSIUM SERPL-SCNC: 4.9 MMOL/L — SIGNIFICANT CHANGE UP (ref 3.5–5.3)
POTASSIUM SERPL-SCNC: 5 MMOL/L — SIGNIFICANT CHANGE UP (ref 3.5–5.3)
POTASSIUM SERPL-SCNC: 5.1 MMOL/L — SIGNIFICANT CHANGE UP (ref 3.5–5.3)
POTASSIUM SERPL-SCNC: 5.2 MMOL/L — SIGNIFICANT CHANGE UP (ref 3.5–5.3)
POTASSIUM SERPL-SCNC: 5.3 MMOL/L — SIGNIFICANT CHANGE UP (ref 3.5–5.3)
POTASSIUM SERPL-SCNC: 5.5 MMOL/L — HIGH (ref 3.5–5.3)
POTASSIUM SERPL-SCNC: 5.7 MMOL/L — HIGH (ref 3.5–5.3)
POTASSIUM SERPL-SCNC: 5.7 MMOL/L — HIGH (ref 3.5–5.3)
POTASSIUM SERPL-SCNC: 6 MMOL/L — HIGH (ref 3.5–5.3)
POTASSIUM UR-SCNC: 23 MMOL/L — SIGNIFICANT CHANGE UP
PROCALCITONIN SERPL-MCNC: 3.75 NG/ML — HIGH (ref 0.02–0.1)
PROT SERPL-MCNC: 5.4 G/DL — LOW (ref 6–8.3)
PROT SERPL-MCNC: 5.7 G/DL — LOW (ref 6–8.3)
PROT SERPL-MCNC: 5.8 G/DL — LOW (ref 6–8.3)
PROT SERPL-MCNC: 5.8 G/DL — LOW (ref 6–8.3)
PROT SERPL-MCNC: 5.9 G/DL — LOW (ref 6–8.3)
PROT SERPL-MCNC: 6.1 G/DL — SIGNIFICANT CHANGE UP (ref 6–8.3)
PROT SERPL-MCNC: 6.4 G/DL — SIGNIFICANT CHANGE UP (ref 6–8.3)
PROT SERPL-MCNC: 6.4 G/DL — SIGNIFICANT CHANGE UP (ref 6–8.3)
PROT SERPL-MCNC: 6.5 G/DL — SIGNIFICANT CHANGE UP (ref 6–8.3)
PROT SERPL-MCNC: 6.8 G/DL — SIGNIFICANT CHANGE UP (ref 6–8.3)
PROT SERPL-MCNC: 7.3 GM/DL — SIGNIFICANT CHANGE UP (ref 6–8.3)
PROT SERPL-MCNC: 7.8 GM/DL — SIGNIFICANT CHANGE UP (ref 6–8.3)
PROT UR-MCNC: 100 — SIGNIFICANT CHANGE UP
PROT UR-MCNC: ABNORMAL
PROTHROM AB SERPL-ACNC: 16.8 SEC — HIGH (ref 10.6–13.6)
PROTHROM AB SERPL-ACNC: 17.1 SEC — HIGH (ref 10.6–13.6)
PROTHROM AB SERPL-ACNC: 19.8 SEC — HIGH (ref 10.6–13.6)
PROTHROM AB SERPL-ACNC: 22.4 SEC — HIGH (ref 10.6–13.6)
PROTHROM AB SERPL-ACNC: 25.2 SEC — HIGH (ref 10.6–13.6)
PROTHROM AB SERPL-ACNC: 26 SEC — HIGH (ref 10.6–13.6)
PROTHROM AB SERPL-ACNC: 27.3 SEC — HIGH (ref 10.6–13.6)
PROTHROM AB SERPL-ACNC: 31.7 SEC — HIGH (ref 10.6–13.6)
RAPID RVP RESULT: DETECTED
RBC # BLD: 1.4 M/UL — LOW (ref 3.8–5.2)
RBC # BLD: 1.84 M/UL — LOW (ref 3.8–5.2)
RBC # BLD: 1.89 M/UL — LOW (ref 3.8–5.2)
RBC # BLD: 1.91 M/UL — LOW (ref 3.8–5.2)
RBC # BLD: 1.93 M/UL — LOW (ref 3.8–5.2)
RBC # BLD: 2 M/UL — LOW (ref 3.8–5.2)
RBC # BLD: 2.01 M/UL — LOW (ref 3.8–5.2)
RBC # BLD: 2.03 M/UL — LOW (ref 3.8–5.2)
RBC # BLD: 2.03 M/UL — LOW (ref 3.8–5.2)
RBC # BLD: 2.05 M/UL — LOW (ref 3.8–5.2)
RBC # BLD: 2.07 M/UL — LOW (ref 3.8–5.2)
RBC # BLD: 2.07 M/UL — LOW (ref 3.8–5.2)
RBC # BLD: 2.1 M/UL — LOW (ref 3.8–5.2)
RBC # BLD: 2.11 M/UL — LOW (ref 3.8–5.2)
RBC # BLD: 2.14 M/UL — LOW (ref 3.8–5.2)
RBC # BLD: 2.21 M/UL — LOW (ref 3.8–5.2)
RBC # BLD: 2.35 M/UL — LOW (ref 3.8–5.2)
RBC # BLD: 2.47 M/UL — LOW (ref 3.8–5.2)
RBC # BLD: 2.54 M/UL — LOW (ref 3.8–5.2)
RBC # BLD: 2.72 M/UL — LOW (ref 3.8–5.2)
RBC # FLD: 15.6 % — HIGH (ref 10.3–14.5)
RBC # FLD: 15.9 % — HIGH (ref 10.3–14.5)
RBC # FLD: 15.9 % — HIGH (ref 10.3–14.5)
RBC # FLD: 16.1 % — HIGH (ref 10.3–14.5)
RBC # FLD: 17 % — HIGH (ref 10.3–14.5)
RBC # FLD: 17 % — HIGH (ref 10.3–14.5)
RBC # FLD: 17.2 % — HIGH (ref 10.3–14.5)
RBC # FLD: 17.7 % — HIGH (ref 10.3–14.5)
RBC # FLD: 18.1 % — HIGH (ref 10.3–14.5)
RBC # FLD: 18.8 % — HIGH (ref 10.3–14.5)
RBC # FLD: 19 % — HIGH (ref 10.3–14.5)
RBC # FLD: 19.6 % — HIGH (ref 10.3–14.5)
RBC # FLD: 19.7 % — HIGH (ref 10.3–14.5)
RBC # FLD: 19.8 % — HIGH (ref 10.3–14.5)
RBC # FLD: 20.1 % — HIGH (ref 10.3–14.5)
RBC # FLD: 20.2 % — HIGH (ref 10.3–14.5)
RBC # FLD: 20.6 % — HIGH (ref 10.3–14.5)
RBC # FLD: 20.6 % — HIGH (ref 10.3–14.5)
RBC # FLD: 20.8 % — HIGH (ref 10.3–14.5)
RBC # FLD: 21 % — HIGH (ref 10.3–14.5)
RBC # FLD: 21 % — HIGH (ref 10.3–14.5)
RBC BLD AUTO: ABNORMAL
RBC CASTS # UR COMP ASSIST: 11 /HPF — HIGH (ref 0–4)
RBC CASTS # UR COMP ASSIST: 5 /HPF — HIGH (ref 0–4)
RETICS #: 76.8 K/UL — SIGNIFICANT CHANGE UP (ref 25–125)
RETICS/RBC NFR: 3.6 % — HIGH (ref 0.5–2.5)
RH IG SCN BLD-IMP: POSITIVE — SIGNIFICANT CHANGE UP
S AUREUS DNA NOSE QL NAA+PROBE: DETECTED
SAO2 % BLDV: 61.3 % — LOW (ref 67–88)
SAO2 % BLDV: 62.7 % — LOW (ref 67–88)
SAO2 % BLDV: 69.1 % — SIGNIFICANT CHANGE UP (ref 67–88)
SAO2 % BLDV: 72.7 % — SIGNIFICANT CHANGE UP (ref 67–88)
SAO2 % BLDV: 73.8 % — SIGNIFICANT CHANGE UP (ref 67–88)
SAO2 % BLDV: 74.4 % — SIGNIFICANT CHANGE UP (ref 67–88)
SAO2 % BLDV: 78.2 % — SIGNIFICANT CHANGE UP (ref 67–88)
SAO2 % BLDV: 80.3 % — SIGNIFICANT CHANGE UP (ref 67–88)
SAO2 % BLDV: 81 % — SIGNIFICANT CHANGE UP (ref 67–88)
SAO2 % BLDV: 86.4 % — SIGNIFICANT CHANGE UP (ref 67–88)
SARS-COV-2 IGG+IGM SERPL QL IA: 125 U/ML — HIGH
SARS-COV-2 IGG+IGM SERPL QL IA: POSITIVE
SARS-COV-2 RNA SPEC QL NAA+PROBE: SIGNIFICANT CHANGE UP
SMOOTH MUSCLE AB SER-ACNC: SIGNIFICANT CHANGE UP
SODIUM SERPL-SCNC: 114 MMOL/L — CRITICAL LOW (ref 135–145)
SODIUM SERPL-SCNC: 115 MMOL/L — CRITICAL LOW (ref 135–145)
SODIUM SERPL-SCNC: 116 MMOL/L — CRITICAL LOW (ref 135–145)
SODIUM SERPL-SCNC: 118 MMOL/L — CRITICAL LOW (ref 135–145)
SODIUM SERPL-SCNC: 121 MMOL/L — LOW (ref 135–145)
SODIUM SERPL-SCNC: 125 MMOL/L — LOW (ref 135–145)
SODIUM SERPL-SCNC: 126 MMOL/L — LOW (ref 135–145)
SODIUM SERPL-SCNC: 126 MMOL/L — LOW (ref 135–145)
SODIUM SERPL-SCNC: 128 MMOL/L — LOW (ref 135–145)
SODIUM SERPL-SCNC: 128 MMOL/L — LOW (ref 135–145)
SODIUM SERPL-SCNC: 129 MMOL/L — LOW (ref 135–145)
SODIUM SERPL-SCNC: 132 MMOL/L — LOW (ref 135–145)
SODIUM SERPL-SCNC: 133 MMOL/L — LOW (ref 135–145)
SODIUM SERPL-SCNC: 134 MMOL/L — LOW (ref 135–145)
SODIUM SERPL-SCNC: 135 MMOL/L — SIGNIFICANT CHANGE UP (ref 135–145)
SODIUM SERPL-SCNC: 136 MMOL/L — SIGNIFICANT CHANGE UP (ref 135–145)
SODIUM SERPL-SCNC: 137 MMOL/L — SIGNIFICANT CHANGE UP (ref 135–145)
SODIUM SERPL-SCNC: 138 MMOL/L — SIGNIFICANT CHANGE UP (ref 135–145)
SODIUM SERPL-SCNC: 138 MMOL/L — SIGNIFICANT CHANGE UP (ref 135–145)
SODIUM SERPL-SCNC: 141 MMOL/L — SIGNIFICANT CHANGE UP (ref 135–145)
SODIUM SERPL-SCNC: 142 MMOL/L — SIGNIFICANT CHANGE UP (ref 135–145)
SODIUM UR-SCNC: 20 MMOL/L — SIGNIFICANT CHANGE UP
SP GR SPEC: 1.02 — SIGNIFICANT CHANGE UP (ref 1.01–1.02)
SP GR SPEC: 1.03 — HIGH (ref 1.01–1.02)
SPECIMEN SOURCE: SIGNIFICANT CHANGE UP
TARGETS BLD QL SMEAR: SLIGHT — SIGNIFICANT CHANGE UP
TIBC SERPL-MCNC: 158 UG/DL — LOW (ref 220–430)
TSH SERPL-MCNC: 1.38 UIU/ML — SIGNIFICANT CHANGE UP (ref 0.27–4.2)
UIBC SERPL-MCNC: 40 UG/DL — LOW (ref 110–370)
UROBILINOGEN FLD QL: ABNORMAL
UROBILINOGEN FLD QL: NEGATIVE — SIGNIFICANT CHANGE UP
VIT B12 SERPL-MCNC: >2000 PG/ML — HIGH (ref 232–1245)
WBC # BLD: 11.89 K/UL — HIGH (ref 3.8–10.5)
WBC # BLD: 15.5 K/UL — HIGH (ref 3.8–10.5)
WBC # BLD: 18.86 K/UL — HIGH (ref 3.8–10.5)
WBC # BLD: 19.64 K/UL — HIGH (ref 3.8–10.5)
WBC # BLD: 19.78 K/UL — HIGH (ref 3.8–10.5)
WBC # BLD: 21.29 K/UL — HIGH (ref 3.8–10.5)
WBC # BLD: 24.32 K/UL — HIGH (ref 3.8–10.5)
WBC # BLD: 24.81 K/UL — HIGH (ref 3.8–10.5)
WBC # BLD: 24.91 K/UL — HIGH (ref 3.8–10.5)
WBC # BLD: 25.07 K/UL — HIGH (ref 3.8–10.5)
WBC # BLD: 25.08 K/UL — HIGH (ref 3.8–10.5)
WBC # BLD: 25.29 K/UL — HIGH (ref 3.8–10.5)
WBC # BLD: 25.78 K/UL — HIGH (ref 3.8–10.5)
WBC # BLD: 27.32 K/UL — HIGH (ref 3.8–10.5)
WBC # BLD: 28.41 K/UL — HIGH (ref 3.8–10.5)
WBC # BLD: 29.8 K/UL — HIGH (ref 3.8–10.5)
WBC # BLD: 31.25 K/UL — HIGH (ref 3.8–10.5)
WBC # BLD: 33.06 K/UL — HIGH (ref 3.8–10.5)
WBC # BLD: 6.57 K/UL — SIGNIFICANT CHANGE UP (ref 3.8–10.5)
WBC # BLD: 6.91 K/UL — SIGNIFICANT CHANGE UP (ref 3.8–10.5)
WBC # BLD: 8.46 K/UL — SIGNIFICANT CHANGE UP (ref 3.8–10.5)
WBC # FLD AUTO: 11.89 K/UL — HIGH (ref 3.8–10.5)
WBC # FLD AUTO: 15.5 K/UL — HIGH (ref 3.8–10.5)
WBC # FLD AUTO: 18.86 K/UL — HIGH (ref 3.8–10.5)
WBC # FLD AUTO: 19.64 K/UL — HIGH (ref 3.8–10.5)
WBC # FLD AUTO: 19.78 K/UL — HIGH (ref 3.8–10.5)
WBC # FLD AUTO: 21.29 K/UL — HIGH (ref 3.8–10.5)
WBC # FLD AUTO: 24.32 K/UL — HIGH (ref 3.8–10.5)
WBC # FLD AUTO: 24.81 K/UL — HIGH (ref 3.8–10.5)
WBC # FLD AUTO: 24.91 K/UL — HIGH (ref 3.8–10.5)
WBC # FLD AUTO: 25.07 K/UL — HIGH (ref 3.8–10.5)
WBC # FLD AUTO: 25.08 K/UL — HIGH (ref 3.8–10.5)
WBC # FLD AUTO: 25.29 K/UL — HIGH (ref 3.8–10.5)
WBC # FLD AUTO: 25.78 K/UL — HIGH (ref 3.8–10.5)
WBC # FLD AUTO: 27.32 K/UL — HIGH (ref 3.8–10.5)
WBC # FLD AUTO: 28.41 K/UL — HIGH (ref 3.8–10.5)
WBC # FLD AUTO: 29.8 K/UL — HIGH (ref 3.8–10.5)
WBC # FLD AUTO: 31.25 K/UL — HIGH (ref 3.8–10.5)
WBC # FLD AUTO: 33.06 K/UL — HIGH (ref 3.8–10.5)
WBC # FLD AUTO: 6.57 K/UL — SIGNIFICANT CHANGE UP (ref 3.8–10.5)
WBC # FLD AUTO: 6.91 K/UL — SIGNIFICANT CHANGE UP (ref 3.8–10.5)
WBC # FLD AUTO: 8.46 K/UL — SIGNIFICANT CHANGE UP (ref 3.8–10.5)
WBC UR QL: 151 /HPF — HIGH (ref 0–5)
WBC UR QL: 2 /HPF — SIGNIFICANT CHANGE UP (ref 0–5)

## 2021-01-01 PROCEDURE — 83735 ASSAY OF MAGNESIUM: CPT

## 2021-01-01 PROCEDURE — 84295 ASSAY OF SERUM SODIUM: CPT

## 2021-01-01 PROCEDURE — 84132 ASSAY OF SERUM POTASSIUM: CPT

## 2021-01-01 PROCEDURE — 36800 INSERTION OF CANNULA: CPT | Mod: GC

## 2021-01-01 PROCEDURE — 85045 AUTOMATED RETICULOCYTE COUNT: CPT

## 2021-01-01 PROCEDURE — 71045 X-RAY EXAM CHEST 1 VIEW: CPT | Mod: 26

## 2021-01-01 PROCEDURE — 86900 BLOOD TYPING SEROLOGIC ABO: CPT

## 2021-01-01 PROCEDURE — 84100 ASSAY OF PHOSPHORUS: CPT

## 2021-01-01 PROCEDURE — G0444 DEPRESSION SCREEN ANNUAL: CPT | Mod: NC,59

## 2021-01-01 PROCEDURE — 36430 TRANSFUSION BLD/BLD COMPNT: CPT

## 2021-01-01 PROCEDURE — 87186 SC STD MICRODIL/AGAR DIL: CPT

## 2021-01-01 PROCEDURE — 85027 COMPLETE CBC AUTOMATED: CPT

## 2021-01-01 PROCEDURE — 85384 FIBRINOGEN ACTIVITY: CPT

## 2021-01-01 PROCEDURE — 99233 SBSQ HOSP IP/OBS HIGH 50: CPT | Mod: GC

## 2021-01-01 PROCEDURE — 99223 1ST HOSP IP/OBS HIGH 75: CPT | Mod: 25,GC

## 2021-01-01 PROCEDURE — 93306 TTE W/DOPPLER COMPLETE: CPT | Mod: 26

## 2021-01-01 PROCEDURE — 87150 DNA/RNA AMPLIFIED PROBE: CPT

## 2021-01-01 PROCEDURE — 99291 CRITICAL CARE FIRST HOUR: CPT | Mod: 25

## 2021-01-01 PROCEDURE — 82728 ASSAY OF FERRITIN: CPT

## 2021-01-01 PROCEDURE — 85610 PROTHROMBIN TIME: CPT

## 2021-01-01 PROCEDURE — 84443 ASSAY THYROID STIM HORMONE: CPT

## 2021-01-01 PROCEDURE — 99291 CRITICAL CARE FIRST HOUR: CPT

## 2021-01-01 PROCEDURE — 83935 ASSAY OF URINE OSMOLALITY: CPT

## 2021-01-01 PROCEDURE — 83010 ASSAY OF HAPTOGLOBIN QUANT: CPT

## 2021-01-01 PROCEDURE — 82787 IGG 1 2 3 OR 4 EACH: CPT

## 2021-01-01 PROCEDURE — 74018 RADEX ABDOMEN 1 VIEW: CPT

## 2021-01-01 PROCEDURE — 99292 CRITICAL CARE ADDL 30 MIN: CPT | Mod: 25

## 2021-01-01 PROCEDURE — 87641 MR-STAPH DNA AMP PROBE: CPT

## 2021-01-01 PROCEDURE — 99232 SBSQ HOSP IP/OBS MODERATE 35: CPT | Mod: GC

## 2021-01-01 PROCEDURE — 85018 HEMOGLOBIN: CPT

## 2021-01-01 PROCEDURE — 86376 MICROSOMAL ANTIBODY EACH: CPT

## 2021-01-01 PROCEDURE — P9047: CPT

## 2021-01-01 PROCEDURE — 83540 ASSAY OF IRON: CPT

## 2021-01-01 PROCEDURE — 85730 THROMBOPLASTIN TIME PARTIAL: CPT

## 2021-01-01 PROCEDURE — 99223 1ST HOSP IP/OBS HIGH 75: CPT | Mod: GC

## 2021-01-01 PROCEDURE — 90945 DIALYSIS ONE EVALUATION: CPT | Mod: GC

## 2021-01-01 PROCEDURE — 82330 ASSAY OF CALCIUM: CPT

## 2021-01-01 PROCEDURE — 36556 INSERT NON-TUNNEL CV CATH: CPT | Mod: GC

## 2021-01-01 PROCEDURE — 84133 ASSAY OF URINE POTASSIUM: CPT

## 2021-01-01 PROCEDURE — U0005: CPT

## 2021-01-01 PROCEDURE — 99292 CRITICAL CARE ADDL 30 MIN: CPT

## 2021-01-01 PROCEDURE — 82962 GLUCOSE BLOOD TEST: CPT

## 2021-01-01 PROCEDURE — 86901 BLOOD TYPING SEROLOGIC RH(D): CPT

## 2021-01-01 PROCEDURE — 82435 ASSAY OF BLOOD CHLORIDE: CPT

## 2021-01-01 PROCEDURE — 82140 ASSAY OF AMMONIA: CPT

## 2021-01-01 PROCEDURE — 93000 ELECTROCARDIOGRAM COMPLETE: CPT

## 2021-01-01 PROCEDURE — 93975 VASCULAR STUDY: CPT | Mod: 26

## 2021-01-01 PROCEDURE — 74018 RADEX ABDOMEN 1 VIEW: CPT | Mod: 26

## 2021-01-01 PROCEDURE — 93005 ELECTROCARDIOGRAM TRACING: CPT

## 2021-01-01 PROCEDURE — 83605 ASSAY OF LACTIC ACID: CPT

## 2021-01-01 PROCEDURE — P9016: CPT

## 2021-01-01 PROCEDURE — 86850 RBC ANTIBODY SCREEN: CPT

## 2021-01-01 PROCEDURE — 99072 ADDL SUPL MATRL&STAF TM PHE: CPT

## 2021-01-01 PROCEDURE — 86022 PLATELET ANTIBODIES: CPT

## 2021-01-01 PROCEDURE — 99214 OFFICE O/P EST MOD 30 MIN: CPT | Mod: 25

## 2021-01-01 PROCEDURE — 87086 URINE CULTURE/COLONY COUNT: CPT

## 2021-01-01 PROCEDURE — 87077 CULTURE AEROBIC IDENTIFY: CPT

## 2021-01-01 PROCEDURE — 87640 STAPH A DNA AMP PROBE: CPT

## 2021-01-01 PROCEDURE — U0003: CPT

## 2021-01-01 PROCEDURE — 74177 CT ABD & PELVIS W/CONTRAST: CPT

## 2021-01-01 PROCEDURE — 87040 BLOOD CULTURE FOR BACTERIA: CPT

## 2021-01-01 PROCEDURE — 82565 ASSAY OF CREATININE: CPT

## 2021-01-01 PROCEDURE — 86790 VIRUS ANTIBODY NOS: CPT

## 2021-01-01 PROCEDURE — 83930 ASSAY OF BLOOD OSMOLALITY: CPT

## 2021-01-01 PROCEDURE — 76604 US EXAM CHEST: CPT | Mod: 26,GC

## 2021-01-01 PROCEDURE — 86923 COMPATIBILITY TEST ELECTRIC: CPT

## 2021-01-01 PROCEDURE — 82103 ALPHA-1-ANTITRYPSIN TOTAL: CPT

## 2021-01-01 PROCEDURE — 99213 OFFICE O/P EST LOW 20 MIN: CPT

## 2021-01-01 PROCEDURE — 80048 BASIC METABOLIC PNL TOTAL CA: CPT

## 2021-01-01 PROCEDURE — 87521 HEPATITIS C PROBE&RVRS TRNSC: CPT

## 2021-01-01 PROCEDURE — 74177 CT ABD & PELVIS W/CONTRAST: CPT | Mod: 26

## 2021-01-01 PROCEDURE — 76700 US EXAM ABDOM COMPLETE: CPT | Mod: 26,59

## 2021-01-01 PROCEDURE — 86038 ANTINUCLEAR ANTIBODIES: CPT

## 2021-01-01 PROCEDURE — 82525 ASSAY OF COPPER: CPT

## 2021-01-01 PROCEDURE — 99396 PREV VISIT EST AGE 40-64: CPT

## 2021-01-01 PROCEDURE — 86769 SARS-COV-2 COVID-19 ANTIBODY: CPT

## 2021-01-01 PROCEDURE — 80053 COMPREHEN METABOLIC PANEL: CPT

## 2021-01-01 PROCEDURE — 84300 ASSAY OF URINE SODIUM: CPT

## 2021-01-01 PROCEDURE — 82390 ASSAY OF CERULOPLASMIN: CPT

## 2021-01-01 PROCEDURE — 82746 ASSAY OF FOLIC ACID SERUM: CPT

## 2021-01-01 PROCEDURE — 80307 DRUG TEST PRSMV CHEM ANLYZR: CPT

## 2021-01-01 PROCEDURE — 84145 PROCALCITONIN (PCT): CPT

## 2021-01-01 PROCEDURE — 93010 ELECTROCARDIOGRAM REPORT: CPT

## 2021-01-01 PROCEDURE — 76705 ECHO EXAM OF ABDOMEN: CPT | Mod: 26,GC

## 2021-01-01 PROCEDURE — 82947 ASSAY GLUCOSE BLOOD QUANT: CPT

## 2021-01-01 PROCEDURE — 82803 BLOOD GASES ANY COMBINATION: CPT

## 2021-01-01 PROCEDURE — P9047: CPT | Mod: JG

## 2021-01-01 PROCEDURE — 86803 HEPATITIS C AB TEST: CPT

## 2021-01-01 PROCEDURE — 71045 X-RAY EXAM CHEST 1 VIEW: CPT | Mod: 26,59

## 2021-01-01 PROCEDURE — 86255 FLUORESCENT ANTIBODY SCREEN: CPT

## 2021-01-01 PROCEDURE — 0225U NFCT DS DNA&RNA 21 SARSCOV2: CPT

## 2021-01-01 PROCEDURE — 93306 TTE W/DOPPLER COMPLETE: CPT

## 2021-01-01 PROCEDURE — P9045: CPT

## 2021-01-01 PROCEDURE — 83550 IRON BINDING TEST: CPT

## 2021-01-01 PROCEDURE — 80076 HEPATIC FUNCTION PANEL: CPT

## 2021-01-01 PROCEDURE — 83615 LACTATE (LD) (LDH) ENZYME: CPT

## 2021-01-01 PROCEDURE — 76705 ECHO EXAM OF ABDOMEN: CPT

## 2021-01-01 PROCEDURE — 82607 VITAMIN B-12: CPT

## 2021-01-01 PROCEDURE — 85014 HEMATOCRIT: CPT

## 2021-01-01 PROCEDURE — 82272 OCCULT BLD FECES 1-3 TESTS: CPT

## 2021-01-01 PROCEDURE — 36620 INSERTION CATHETER ARTERY: CPT | Mod: GC

## 2021-01-01 PROCEDURE — 83036 HEMOGLOBIN GLYCOSYLATED A1C: CPT

## 2021-01-01 PROCEDURE — 71045 X-RAY EXAM CHEST 1 VIEW: CPT

## 2021-01-01 PROCEDURE — G0480: CPT

## 2021-01-01 PROCEDURE — 81001 URINALYSIS AUTO W/SCOPE: CPT

## 2021-01-01 PROCEDURE — 80074 ACUTE HEPATITIS PANEL: CPT

## 2021-01-01 PROCEDURE — 71045 X-RAY EXAM CHEST 1 VIEW: CPT | Mod: 26,76

## 2021-01-01 PROCEDURE — 36415 COLL VENOUS BLD VENIPUNCTURE: CPT

## 2021-01-01 PROCEDURE — 76705 ECHO EXAM OF ABDOMEN: CPT | Mod: 26

## 2021-01-01 RX ORDER — LACTULOSE 10 G/15ML
20 SOLUTION ORAL EVERY 24 HOURS
Refills: 0 | Status: DISCONTINUED | OUTPATIENT
Start: 2021-01-01 | End: 2021-01-01

## 2021-01-01 RX ORDER — POTASSIUM CHLORIDE 20 MEQ
40 PACKET (EA) ORAL ONCE
Refills: 0 | Status: DISCONTINUED | OUTPATIENT
Start: 2021-01-01 | End: 2021-01-01

## 2021-01-01 RX ORDER — POTASSIUM CHLORIDE 20 MEQ
40 PACKET (EA) ORAL ONCE
Refills: 0 | Status: COMPLETED | OUTPATIENT
Start: 2021-01-01 | End: 2021-01-01

## 2021-01-01 RX ORDER — ALBUMIN HUMAN 25 %
25 VIAL (ML) INTRAVENOUS EVERY 8 HOURS
Refills: 0 | Status: DISCONTINUED | OUTPATIENT
Start: 2021-01-01 | End: 2021-01-01

## 2021-01-01 RX ORDER — PANTOPRAZOLE SODIUM 20 MG/1
1 TABLET, DELAYED RELEASE ORAL
Qty: 0 | Refills: 0 | DISCHARGE

## 2021-01-01 RX ORDER — SIMETHICONE 80 MG/1
80 TABLET, CHEWABLE ORAL
Refills: 0 | Status: DISCONTINUED | OUTPATIENT
Start: 2021-01-01 | End: 2021-01-01

## 2021-01-01 RX ORDER — HEPARIN SODIUM 5000 [USP'U]/ML
6500 INJECTION INTRAVENOUS; SUBCUTANEOUS EVERY 6 HOURS
Refills: 0 | Status: DISCONTINUED | OUTPATIENT
Start: 2021-01-01 | End: 2021-01-01

## 2021-01-01 RX ORDER — CHLORHEXIDINE GLUCONATE 213 G/1000ML
1 SOLUTION TOPICAL
Refills: 0 | Status: DISCONTINUED | OUTPATIENT
Start: 2021-01-01 | End: 2021-01-01

## 2021-01-01 RX ORDER — INSULIN HUMAN 100 [IU]/ML
10 INJECTION, SOLUTION SUBCUTANEOUS ONCE
Refills: 0 | Status: COMPLETED | OUTPATIENT
Start: 2021-01-01 | End: 2021-01-01

## 2021-01-01 RX ORDER — PROPRANOLOL HYDROCHLORIDE 20 MG/1
20 TABLET ORAL TWICE DAILY
Qty: 60 | Refills: 2 | Status: ACTIVE | COMMUNITY
Start: 2021-01-01 | End: 1900-01-01

## 2021-01-01 RX ORDER — FOLIC ACID 0.8 MG
1 TABLET ORAL DAILY
Refills: 0 | Status: DISCONTINUED | OUTPATIENT
Start: 2021-01-01 | End: 2021-01-01

## 2021-01-01 RX ORDER — LACTULOSE 10 G/15ML
20 SOLUTION ORAL EVERY 8 HOURS
Refills: 0 | Status: DISCONTINUED | OUTPATIENT
Start: 2021-01-01 | End: 2021-01-01

## 2021-01-01 RX ORDER — CEFTRIAXONE 500 MG/1
INJECTION, POWDER, FOR SOLUTION INTRAMUSCULAR; INTRAVENOUS
Refills: 0 | Status: DISCONTINUED | OUTPATIENT
Start: 2021-01-01 | End: 2021-01-01

## 2021-01-01 RX ORDER — SODIUM BICARBONATE 1 MEQ/ML
50 SYRINGE (ML) INTRAVENOUS ONCE
Refills: 0 | Status: COMPLETED | OUTPATIENT
Start: 2021-01-01 | End: 2021-01-01

## 2021-01-01 RX ORDER — CHROMIUM 200 MCG
TABLET ORAL
Refills: 0 | Status: ACTIVE | COMMUNITY

## 2021-01-01 RX ORDER — VASOPRESSIN 20 [USP'U]/ML
0.04 INJECTION INTRAVENOUS
Qty: 50 | Refills: 0 | Status: DISCONTINUED | OUTPATIENT
Start: 2021-01-01 | End: 2021-01-01

## 2021-01-01 RX ORDER — LACTULOSE 10 G/15ML
200 SOLUTION ORAL ONCE
Refills: 0 | Status: COMPLETED | OUTPATIENT
Start: 2021-01-01 | End: 2021-01-01

## 2021-01-01 RX ORDER — MEROPENEM 1 G/30ML
INJECTION INTRAVENOUS
Refills: 0 | Status: DISCONTINUED | OUTPATIENT
Start: 2021-01-01 | End: 2021-01-01

## 2021-01-01 RX ORDER — CEFTRIAXONE 500 MG/1
1000 INJECTION, POWDER, FOR SOLUTION INTRAMUSCULAR; INTRAVENOUS EVERY 24 HOURS
Refills: 0 | Status: DISCONTINUED | OUTPATIENT
Start: 2021-01-01 | End: 2021-01-01

## 2021-01-01 RX ORDER — CALCIUM GLUCONATE 100 MG/ML
2 VIAL (ML) INTRAVENOUS ONCE
Refills: 0 | Status: COMPLETED | OUTPATIENT
Start: 2021-01-01 | End: 2021-01-01

## 2021-01-01 RX ORDER — ATORVASTATIN CALCIUM 80 MG/1
1 TABLET, FILM COATED ORAL
Qty: 0 | Refills: 0 | DISCHARGE

## 2021-01-01 RX ORDER — PANTOPRAZOLE SODIUM 20 MG/1
40 TABLET, DELAYED RELEASE ORAL ONCE
Refills: 0 | Status: COMPLETED | OUTPATIENT
Start: 2021-01-01 | End: 2021-01-01

## 2021-01-01 RX ORDER — VANCOMYCIN HCL 1 G
1000 VIAL (EA) INTRAVENOUS ONCE
Refills: 0 | Status: COMPLETED | OUTPATIENT
Start: 2021-01-01 | End: 2021-01-01

## 2021-01-01 RX ORDER — POTASSIUM CHLORIDE 20 MEQ
20 PACKET (EA) ORAL ONCE
Refills: 0 | Status: DISCONTINUED | OUTPATIENT
Start: 2021-01-01 | End: 2021-01-01

## 2021-01-01 RX ORDER — CALCIUM GLUCONATE 100 MG/ML
2 VIAL (ML) INTRAVENOUS
Refills: 0 | Status: COMPLETED | OUTPATIENT
Start: 2021-01-01 | End: 2021-01-01

## 2021-01-01 RX ORDER — LACTULOSE 10 G/15ML
20 SOLUTION ORAL THREE TIMES A DAY
Refills: 0 | Status: DISCONTINUED | OUTPATIENT
Start: 2021-01-01 | End: 2021-01-01

## 2021-01-01 RX ORDER — POTASSIUM PHOSPHATE, MONOBASIC POTASSIUM PHOSPHATE, DIBASIC 236; 224 MG/ML; MG/ML
15 INJECTION, SOLUTION INTRAVENOUS ONCE
Refills: 0 | Status: COMPLETED | OUTPATIENT
Start: 2021-01-01 | End: 2021-01-01

## 2021-01-01 RX ORDER — DEXMEDETOMIDINE HYDROCHLORIDE IN 0.9% SODIUM CHLORIDE 4 UG/ML
0.2 INJECTION INTRAVENOUS
Qty: 200 | Refills: 0 | Status: DISCONTINUED | OUTPATIENT
Start: 2021-01-01 | End: 2021-01-01

## 2021-01-01 RX ORDER — POTASSIUM CHLORIDE 20 MEQ
20 PACKET (EA) ORAL ONCE
Refills: 0 | Status: COMPLETED | OUTPATIENT
Start: 2021-01-01 | End: 2021-01-01

## 2021-01-01 RX ORDER — PIPERACILLIN AND TAZOBACTAM 4; .5 G/20ML; G/20ML
3.38 INJECTION, POWDER, LYOPHILIZED, FOR SOLUTION INTRAVENOUS ONCE
Refills: 0 | Status: COMPLETED | OUTPATIENT
Start: 2021-01-01 | End: 2021-01-01

## 2021-01-01 RX ORDER — HEPARIN SODIUM 5000 [USP'U]/ML
5000 INJECTION INTRAVENOUS; SUBCUTANEOUS EVERY 8 HOURS
Refills: 0 | Status: DISCONTINUED | OUTPATIENT
Start: 2021-01-01 | End: 2021-01-01

## 2021-01-01 RX ORDER — LACTULOSE 10 G/15ML
20 SOLUTION ORAL
Refills: 0 | Status: DISCONTINUED | OUTPATIENT
Start: 2021-01-01 | End: 2021-01-01

## 2021-01-01 RX ORDER — GABAPENTIN 300 MG/1
300 CAPSULE ORAL 4 TIMES DAILY
Qty: 360 | Refills: 1 | Status: ACTIVE | COMMUNITY
Start: 2019-12-23 | End: 1900-01-01

## 2021-01-01 RX ORDER — THIAMINE MONONITRATE (VIT B1) 100 MG
100 TABLET ORAL DAILY
Refills: 0 | Status: DISCONTINUED | OUTPATIENT
Start: 2021-01-01 | End: 2021-01-01

## 2021-01-01 RX ORDER — MIDODRINE HYDROCHLORIDE 2.5 MG/1
20 TABLET ORAL EVERY 8 HOURS
Refills: 0 | Status: DISCONTINUED | OUTPATIENT
Start: 2021-01-01 | End: 2021-01-01

## 2021-01-01 RX ORDER — ROBINUL 0.2 MG/ML
0.2 INJECTION INTRAMUSCULAR; INTRAVENOUS EVERY 6 HOURS
Refills: 0 | Status: DISCONTINUED | OUTPATIENT
Start: 2021-01-01 | End: 2021-01-01

## 2021-01-01 RX ORDER — ACETYLCYSTEINE 200 MG/ML
4.1 VIAL (ML) MISCELLANEOUS ONCE
Refills: 0 | Status: COMPLETED | OUTPATIENT
Start: 2021-01-01 | End: 2021-01-01

## 2021-01-01 RX ORDER — SODIUM CHLORIDE 9 MG/ML
1000 INJECTION, SOLUTION INTRAVENOUS
Refills: 0 | Status: DISCONTINUED | OUTPATIENT
Start: 2021-01-01 | End: 2021-01-01

## 2021-01-01 RX ORDER — QUETIAPINE FUMARATE 200 MG/1
25 TABLET, FILM COATED ORAL EVERY 12 HOURS
Refills: 0 | Status: DISCONTINUED | OUTPATIENT
Start: 2021-01-01 | End: 2021-01-01

## 2021-01-01 RX ORDER — MEROPENEM 1 G/30ML
500 INJECTION INTRAVENOUS EVERY 8 HOURS
Refills: 0 | Status: DISCONTINUED | OUTPATIENT
Start: 2021-01-01 | End: 2021-01-01

## 2021-01-01 RX ORDER — DEXTROSE 50 % IN WATER 50 %
50 SYRINGE (ML) INTRAVENOUS ONCE
Refills: 0 | Status: COMPLETED | OUTPATIENT
Start: 2021-01-01 | End: 2021-01-01

## 2021-01-01 RX ORDER — HEPARIN SODIUM 5000 [USP'U]/ML
INJECTION INTRAVENOUS; SUBCUTANEOUS
Qty: 25000 | Refills: 0 | Status: DISCONTINUED | OUTPATIENT
Start: 2021-01-01 | End: 2021-01-01

## 2021-01-01 RX ORDER — MEROPENEM 1 G/30ML
500 INJECTION INTRAVENOUS ONCE
Refills: 0 | Status: COMPLETED | OUTPATIENT
Start: 2021-01-01 | End: 2021-01-01

## 2021-01-01 RX ORDER — PIPERACILLIN AND TAZOBACTAM 4; .5 G/20ML; G/20ML
3.38 INJECTION, POWDER, LYOPHILIZED, FOR SOLUTION INTRAVENOUS EVERY 8 HOURS
Refills: 0 | Status: DISCONTINUED | OUTPATIENT
Start: 2021-01-01 | End: 2021-01-01

## 2021-01-01 RX ORDER — SIMVASTATIN 20 MG/1
1 TABLET, FILM COATED ORAL
Qty: 0 | Refills: 0 | DISCHARGE

## 2021-01-01 RX ORDER — ALPRAZOLAM 0.5 MG/1
0.5 TABLET ORAL
Qty: 60 | Refills: 0 | Status: ACTIVE | COMMUNITY
Start: 2019-11-26 | End: 1900-01-01

## 2021-01-01 RX ORDER — HYDROMORPHONE HYDROCHLORIDE 2 MG/ML
0.25 INJECTION INTRAMUSCULAR; INTRAVENOUS; SUBCUTANEOUS EVERY 6 HOURS
Refills: 0 | Status: DISCONTINUED | OUTPATIENT
Start: 2021-01-01 | End: 2021-01-01

## 2021-01-01 RX ORDER — SODIUM BICARBONATE 1 MEQ/ML
0.18 SYRINGE (ML) INTRAVENOUS
Qty: 150 | Refills: 0 | Status: DISCONTINUED | OUTPATIENT
Start: 2021-01-01 | End: 2021-01-01

## 2021-01-01 RX ORDER — PHENYLEPHRINE HYDROCHLORIDE 10 MG/ML
1 INJECTION INTRAVENOUS
Qty: 160 | Refills: 0 | Status: DISCONTINUED | OUTPATIENT
Start: 2021-01-01 | End: 2021-01-01

## 2021-01-01 RX ORDER — ACETAMINOPHEN 500 MG
650 TABLET ORAL ONCE
Refills: 0 | Status: COMPLETED | OUTPATIENT
Start: 2021-01-01 | End: 2021-01-01

## 2021-01-01 RX ORDER — GABAPENTIN 400 MG/1
1 CAPSULE ORAL
Qty: 0 | Refills: 0 | DISCHARGE

## 2021-01-01 RX ORDER — OCTREOTIDE ACETATE 200 UG/ML
50 INJECTION, SOLUTION INTRAVENOUS; SUBCUTANEOUS
Qty: 500 | Refills: 0 | Status: DISCONTINUED | OUTPATIENT
Start: 2021-01-01 | End: 2021-01-01

## 2021-01-01 RX ORDER — HEPARIN SODIUM 5000 [USP'U]/ML
3000 INJECTION INTRAVENOUS; SUBCUTANEOUS EVERY 6 HOURS
Refills: 0 | Status: DISCONTINUED | OUTPATIENT
Start: 2021-01-01 | End: 2021-01-01

## 2021-01-01 RX ORDER — NOREPINEPHRINE BITARTRATE/D5W 8 MG/250ML
0.05 PLASTIC BAG, INJECTION (ML) INTRAVENOUS
Qty: 16 | Refills: 0 | Status: DISCONTINUED | OUTPATIENT
Start: 2021-01-01 | End: 2021-01-01

## 2021-01-01 RX ORDER — MEROPENEM 1 G/30ML
500 INJECTION INTRAVENOUS EVERY 12 HOURS
Refills: 0 | Status: DISCONTINUED | OUTPATIENT
Start: 2021-01-01 | End: 2021-01-01

## 2021-01-01 RX ORDER — ACETYLCYSTEINE 200 MG/ML
12 VIAL (ML) MISCELLANEOUS ONCE
Refills: 0 | Status: DISCONTINUED | OUTPATIENT
Start: 2021-01-01 | End: 2021-01-01

## 2021-01-01 RX ORDER — NOREPINEPHRINE BITARTRATE/D5W 8 MG/250ML
0.05 PLASTIC BAG, INJECTION (ML) INTRAVENOUS
Qty: 8 | Refills: 0 | Status: DISCONTINUED | OUTPATIENT
Start: 2021-01-01 | End: 2021-01-01

## 2021-01-01 RX ORDER — PIPERACILLIN AND TAZOBACTAM 4; .5 G/20ML; G/20ML
3.38 INJECTION, POWDER, LYOPHILIZED, FOR SOLUTION INTRAVENOUS EVERY 12 HOURS
Refills: 0 | Status: DISCONTINUED | OUTPATIENT
Start: 2021-01-01 | End: 2021-01-01

## 2021-01-01 RX ORDER — DEXTROSE 50 % IN WATER 50 %
25 SYRINGE (ML) INTRAVENOUS ONCE
Refills: 0 | Status: DISCONTINUED | OUTPATIENT
Start: 2021-01-01 | End: 2021-01-01

## 2021-01-01 RX ORDER — HYDROCHLOROTHIAZIDE 12.5 MG/1
12.5 TABLET ORAL
Qty: 90 | Refills: 0 | Status: COMPLETED | COMMUNITY
Start: 2019-08-05 | End: 2021-01-01

## 2021-01-01 RX ORDER — SODIUM CHLORIDE 9 MG/ML
10 INJECTION INTRAMUSCULAR; INTRAVENOUS; SUBCUTANEOUS
Refills: 0 | Status: DISCONTINUED | OUTPATIENT
Start: 2021-01-01 | End: 2021-01-01

## 2021-01-01 RX ORDER — PANTOPRAZOLE SODIUM 20 MG/1
8 TABLET, DELAYED RELEASE ORAL
Qty: 80 | Refills: 0 | Status: DISCONTINUED | OUTPATIENT
Start: 2021-01-01 | End: 2021-01-01

## 2021-01-01 RX ORDER — DEXTROSE 50 % IN WATER 50 %
15 SYRINGE (ML) INTRAVENOUS ONCE
Refills: 0 | Status: DISCONTINUED | OUTPATIENT
Start: 2021-01-01 | End: 2021-01-01

## 2021-01-01 RX ORDER — ACETYLCYSTEINE 200 MG/ML
12 VIAL (ML) MISCELLANEOUS ONCE
Refills: 0 | Status: COMPLETED | OUTPATIENT
Start: 2021-01-01 | End: 2021-01-01

## 2021-01-01 RX ORDER — POTASSIUM CHLORIDE 20 MEQ
20 PACKET (EA) ORAL
Refills: 0 | Status: COMPLETED | OUTPATIENT
Start: 2021-01-01 | End: 2021-01-01

## 2021-01-01 RX ORDER — ALPRAZOLAM 0.25 MG
0.5 TABLET ORAL EVERY 12 HOURS
Refills: 0 | Status: DISCONTINUED | OUTPATIENT
Start: 2021-01-01 | End: 2021-01-01

## 2021-01-01 RX ORDER — SPIRONOLACTONE 25 MG/1
1 TABLET, FILM COATED ORAL
Qty: 0 | Refills: 0 | DISCHARGE

## 2021-01-01 RX ORDER — ALBUMIN HUMAN 25 %
50 VIAL (ML) INTRAVENOUS EVERY 6 HOURS
Refills: 0 | Status: DISCONTINUED | OUTPATIENT
Start: 2021-01-01 | End: 2021-01-01

## 2021-01-01 RX ORDER — SODIUM BICARBONATE 1 MEQ/ML
0.19 SYRINGE (ML) INTRAVENOUS
Qty: 150 | Refills: 0 | Status: DISCONTINUED | OUTPATIENT
Start: 2021-01-01 | End: 2021-01-01

## 2021-01-01 RX ORDER — SODIUM CHLORIDE 9 MG/ML
1000 INJECTION INTRAMUSCULAR; INTRAVENOUS; SUBCUTANEOUS
Refills: 0 | Status: DISCONTINUED | OUTPATIENT
Start: 2021-01-01 | End: 2021-01-01

## 2021-01-01 RX ORDER — INSULIN LISPRO 100/ML
VIAL (ML) SUBCUTANEOUS EVERY 6 HOURS
Refills: 0 | Status: DISCONTINUED | OUTPATIENT
Start: 2021-01-01 | End: 2021-01-01

## 2021-01-01 RX ORDER — CEFTRIAXONE 500 MG/1
1000 INJECTION, POWDER, FOR SOLUTION INTRAMUSCULAR; INTRAVENOUS ONCE
Refills: 0 | Status: COMPLETED | OUTPATIENT
Start: 2021-01-01 | End: 2021-01-01

## 2021-01-01 RX ORDER — HEPARIN SODIUM 5000 [USP'U]/ML
5000 INJECTION INTRAVENOUS; SUBCUTANEOUS EVERY 12 HOURS
Refills: 0 | Status: DISCONTINUED | OUTPATIENT
Start: 2021-01-01 | End: 2021-01-01

## 2021-01-01 RX ORDER — ALPRAZOLAM 0.25 MG
1 TABLET ORAL
Qty: 0 | Refills: 0 | DISCHARGE

## 2021-01-01 RX ORDER — MIDODRINE HYDROCHLORIDE 2.5 MG/1
10 TABLET ORAL EVERY 8 HOURS
Refills: 0 | Status: DISCONTINUED | OUTPATIENT
Start: 2021-01-01 | End: 2021-01-01

## 2021-01-01 RX ORDER — FUROSEMIDE 40 MG/1
40 TABLET ORAL
Qty: 180 | Refills: 0 | Status: ACTIVE | COMMUNITY
Start: 2021-01-01 | End: 1900-01-01

## 2021-01-01 RX ORDER — SODIUM ZIRCONIUM CYCLOSILICATE 10 G/10G
10 POWDER, FOR SUSPENSION ORAL ONCE
Refills: 0 | Status: COMPLETED | OUTPATIENT
Start: 2021-01-01 | End: 2021-01-01

## 2021-01-01 RX ORDER — ACETYLCYSTEINE 200 MG/ML
12 VIAL (ML) MISCELLANEOUS
Refills: 0 | Status: DISCONTINUED | OUTPATIENT
Start: 2021-01-01 | End: 2021-01-01

## 2021-01-01 RX ORDER — HYDROMORPHONE HYDROCHLORIDE 2 MG/ML
1 INJECTION INTRAMUSCULAR; INTRAVENOUS; SUBCUTANEOUS EVERY 4 HOURS
Refills: 0 | Status: DISCONTINUED | OUTPATIENT
Start: 2021-01-01 | End: 2021-01-01

## 2021-01-01 RX ORDER — CALCIUM GLUCONATE 100 MG/ML
2 VIAL (ML) INTRAVENOUS ONCE
Refills: 0 | Status: DISCONTINUED | OUTPATIENT
Start: 2021-01-01 | End: 2021-01-01

## 2021-01-01 RX ORDER — SODIUM,POTASSIUM PHOSPHATES 278-250MG
2 POWDER IN PACKET (EA) ORAL EVERY 4 HOURS
Refills: 0 | Status: DISCONTINUED | OUTPATIENT
Start: 2021-01-01 | End: 2021-01-01

## 2021-01-01 RX ORDER — ESCITALOPRAM OXALATE 10 MG/1
10 TABLET ORAL
Qty: 90 | Refills: 0 | Status: COMPLETED | COMMUNITY
Start: 2020-02-07 | End: 2021-01-01

## 2021-01-01 RX ORDER — HYDROMORPHONE HYDROCHLORIDE 2 MG/ML
1 INJECTION INTRAMUSCULAR; INTRAVENOUS; SUBCUTANEOUS ONCE
Refills: 0 | Status: DISCONTINUED | OUTPATIENT
Start: 2021-01-01 | End: 2021-01-01

## 2021-01-01 RX ORDER — MIDODRINE HYDROCHLORIDE 2.5 MG/1
10 TABLET ORAL THREE TIMES A DAY
Refills: 0 | Status: DISCONTINUED | OUTPATIENT
Start: 2021-01-01 | End: 2021-01-01

## 2021-01-01 RX ORDER — HYDROMORPHONE HYDROCHLORIDE 2 MG/ML
0.5 INJECTION INTRAMUSCULAR; INTRAVENOUS; SUBCUTANEOUS
Refills: 0 | Status: DISCONTINUED | OUTPATIENT
Start: 2021-01-01 | End: 2021-01-01

## 2021-01-01 RX ORDER — LACTULOSE 10 G/15ML
30 SOLUTION ORAL ONCE
Refills: 0 | Status: COMPLETED | OUTPATIENT
Start: 2021-01-01 | End: 2021-01-01

## 2021-01-01 RX ORDER — POTASSIUM PHOSPHATE, MONOBASIC POTASSIUM PHOSPHATE, DIBASIC 236; 224 MG/ML; MG/ML
30 INJECTION, SOLUTION INTRAVENOUS ONCE
Refills: 0 | Status: DISCONTINUED | OUTPATIENT
Start: 2021-01-01 | End: 2021-01-01

## 2021-01-01 RX ORDER — HEPARIN SODIUM 5000 [USP'U]/ML
1000 INJECTION INTRAVENOUS; SUBCUTANEOUS
Qty: 25000 | Refills: 0 | Status: DISCONTINUED | OUTPATIENT
Start: 2021-01-01 | End: 2021-01-01

## 2021-01-01 RX ORDER — FENTANYL CITRATE 50 UG/ML
10 INJECTION INTRAVENOUS ONCE
Refills: 0 | Status: DISCONTINUED | OUTPATIENT
Start: 2021-01-01 | End: 2021-01-01

## 2021-01-01 RX ORDER — DEXMEDETOMIDINE HYDROCHLORIDE IN 0.9% SODIUM CHLORIDE 4 UG/ML
0.5 INJECTION INTRAVENOUS
Qty: 200 | Refills: 0 | Status: DISCONTINUED | OUTPATIENT
Start: 2021-01-01 | End: 2021-01-01

## 2021-01-01 RX ORDER — URSODIOL 250 MG/1
500 TABLET, FILM COATED ORAL EVERY 12 HOURS
Refills: 0 | Status: DISCONTINUED | OUTPATIENT
Start: 2021-01-01 | End: 2021-01-01

## 2021-01-01 RX ORDER — PANTOPRAZOLE 40 MG/1
40 TABLET, DELAYED RELEASE ORAL DAILY
Qty: 30 | Refills: 1 | Status: ACTIVE | COMMUNITY
Start: 2019-05-01 | End: 1900-01-01

## 2021-01-01 RX ORDER — DESMOPRESSIN ACETATE 0.1 MG/1
24 TABLET ORAL ONCE
Refills: 0 | Status: DISCONTINUED | OUTPATIENT
Start: 2021-01-01 | End: 2021-01-01

## 2021-01-01 RX ORDER — DESMOPRESSIN ACETATE 0.1 MG/1
20 TABLET ORAL ONCE
Refills: 0 | Status: COMPLETED | OUTPATIENT
Start: 2021-01-01 | End: 2021-01-01

## 2021-01-01 RX ORDER — MELOXICAM 15 MG/1
15 TABLET ORAL
Qty: 15 | Refills: 0 | Status: COMPLETED | COMMUNITY
Start: 2020-02-07 | End: 2021-01-01

## 2021-01-01 RX ORDER — ALBUMIN HUMAN 25 %
250 VIAL (ML) INTRAVENOUS ONCE
Refills: 0 | Status: COMPLETED | OUTPATIENT
Start: 2021-01-01 | End: 2021-01-01

## 2021-01-01 RX ORDER — POTASSIUM PHOSPHATE, MONOBASIC POTASSIUM PHOSPHATE, DIBASIC 236; 224 MG/ML; MG/ML
30 INJECTION, SOLUTION INTRAVENOUS ONCE
Refills: 0 | Status: COMPLETED | OUTPATIENT
Start: 2021-01-01 | End: 2021-01-01

## 2021-01-01 RX ORDER — DESMOPRESSIN ACETATE 0.1 MG/1
24 TABLET ORAL ONCE
Refills: 0 | Status: COMPLETED | OUTPATIENT
Start: 2021-01-01 | End: 2021-01-01

## 2021-01-01 RX ORDER — THIAMINE MONONITRATE (VIT B1) 100 MG
500 TABLET ORAL EVERY 8 HOURS
Refills: 0 | Status: DISCONTINUED | OUTPATIENT
Start: 2021-01-01 | End: 2021-01-01

## 2021-01-01 RX ORDER — SPIRONOLACTONE 100 MG/1
100 TABLET ORAL
Refills: 0 | Status: ACTIVE | COMMUNITY

## 2021-01-01 RX ORDER — FUROSEMIDE 40 MG
1 TABLET ORAL
Qty: 0 | Refills: 0 | DISCHARGE

## 2021-01-01 RX ORDER — SIMVASTATIN 10 MG/1
10 TABLET, FILM COATED ORAL
Qty: 90 | Refills: 1 | Status: ACTIVE | COMMUNITY
Start: 2021-01-01 | End: 1900-01-01

## 2021-01-01 RX ORDER — PHENYLEPHRINE HYDROCHLORIDE 10 MG/ML
0.4 INJECTION INTRAVENOUS
Qty: 40 | Refills: 0 | Status: DISCONTINUED | OUTPATIENT
Start: 2021-01-01 | End: 2021-01-01

## 2021-01-01 RX ORDER — GLUCAGON INJECTION, SOLUTION 0.5 MG/.1ML
1 INJECTION, SOLUTION SUBCUTANEOUS ONCE
Refills: 0 | Status: DISCONTINUED | OUTPATIENT
Start: 2021-01-01 | End: 2021-01-01

## 2021-01-01 RX ORDER — DEXTROSE 50 % IN WATER 50 %
12.5 SYRINGE (ML) INTRAVENOUS ONCE
Refills: 0 | Status: DISCONTINUED | OUTPATIENT
Start: 2021-01-01 | End: 2021-01-01

## 2021-01-01 RX ORDER — PROPRANOLOL HCL 160 MG
1 CAPSULE, EXTENDED RELEASE 24HR ORAL
Qty: 0 | Refills: 0 | DISCHARGE

## 2021-01-01 RX ORDER — NOREPINEPHRINE BITARTRATE/D5W 8 MG/250ML
0.05 PLASTIC BAG, INJECTION (ML) INTRAVENOUS
Qty: 32 | Refills: 0 | Status: DISCONTINUED | OUTPATIENT
Start: 2021-01-01 | End: 2021-01-01

## 2021-01-01 RX ORDER — FOLIC ACID 0.8 MG
1 TABLET ORAL
Qty: 0 | Refills: 0 | DISCHARGE

## 2021-01-01 RX ORDER — LACTULOSE 10 G/15ML
20 SOLUTION ORAL EVERY 6 HOURS
Refills: 0 | Status: DISCONTINUED | OUTPATIENT
Start: 2021-01-01 | End: 2021-01-01

## 2021-01-01 RX ORDER — POTASSIUM CHLORIDE 20 MEQ
10 PACKET (EA) ORAL
Refills: 0 | Status: DISCONTINUED | OUTPATIENT
Start: 2021-01-01 | End: 2021-01-01

## 2021-01-01 RX ADMIN — DEXMEDETOMIDINE HYDROCHLORIDE IN 0.9% SODIUM CHLORIDE 4.05 MICROGRAM(S)/KG/HR: 4 INJECTION INTRAVENOUS at 19:34

## 2021-01-01 RX ADMIN — Medication 1: at 17:13

## 2021-01-01 RX ADMIN — MIDODRINE HYDROCHLORIDE 20 MILLIGRAM(S): 2.5 TABLET ORAL at 11:09

## 2021-01-01 RX ADMIN — INSULIN HUMAN 10 UNIT(S): 100 INJECTION, SOLUTION SUBCUTANEOUS at 16:37

## 2021-01-01 RX ADMIN — Medication 105 MILLIGRAM(S): at 22:46

## 2021-01-01 RX ADMIN — Medication 50 MILLILITER(S): at 11:23

## 2021-01-01 RX ADMIN — MIDODRINE HYDROCHLORIDE 20 MILLIGRAM(S): 2.5 TABLET ORAL at 21:12

## 2021-01-01 RX ADMIN — Medication 100 GRAM(S): at 11:30

## 2021-01-01 RX ADMIN — ROBINUL 0.2 MILLIGRAM(S): 0.2 INJECTION INTRAMUSCULAR; INTRAVENOUS at 23:03

## 2021-01-01 RX ADMIN — DEXMEDETOMIDINE HYDROCHLORIDE IN 0.9% SODIUM CHLORIDE 4.05 MICROGRAM(S)/KG/HR: 4 INJECTION INTRAVENOUS at 04:00

## 2021-01-01 RX ADMIN — MIDODRINE HYDROCHLORIDE 20 MILLIGRAM(S): 2.5 TABLET ORAL at 17:13

## 2021-01-01 RX ADMIN — Medication 7.59 MICROGRAM(S)/KG/MIN: at 22:01

## 2021-01-01 RX ADMIN — Medication 2: at 18:13

## 2021-01-01 RX ADMIN — Medication 100 MEQ/KG/HR: at 19:40

## 2021-01-01 RX ADMIN — Medication 50 MILLILITER(S): at 23:09

## 2021-01-01 RX ADMIN — DEXMEDETOMIDINE HYDROCHLORIDE IN 0.9% SODIUM CHLORIDE 4.05 MICROGRAM(S)/KG/HR: 4 INJECTION INTRAVENOUS at 20:00

## 2021-01-01 RX ADMIN — Medication 7.59 MICROGRAM(S)/KG/MIN: at 19:34

## 2021-01-01 RX ADMIN — Medication 105 MILLIGRAM(S): at 05:05

## 2021-01-01 RX ADMIN — URSODIOL 500 MILLIGRAM(S): 250 TABLET, FILM COATED ORAL at 05:17

## 2021-01-01 RX ADMIN — Medication 1: at 23:48

## 2021-01-01 RX ADMIN — PIPERACILLIN AND TAZOBACTAM 200 GRAM(S): 4; .5 INJECTION, POWDER, LYOPHILIZED, FOR SOLUTION INTRAVENOUS at 18:17

## 2021-01-01 RX ADMIN — Medication 0.5 MILLIGRAM(S): at 18:56

## 2021-01-01 RX ADMIN — Medication 2: at 11:23

## 2021-01-01 RX ADMIN — SIMETHICONE 80 MILLIGRAM(S): 80 TABLET, CHEWABLE ORAL at 17:27

## 2021-01-01 RX ADMIN — Medication 1 MILLIGRAM(S): at 12:59

## 2021-01-01 RX ADMIN — DEXMEDETOMIDINE HYDROCHLORIDE IN 0.9% SODIUM CHLORIDE 10.1 MICROGRAM(S)/KG/HR: 4 INJECTION INTRAVENOUS at 22:01

## 2021-01-01 RX ADMIN — CEFTRIAXONE 100 MILLIGRAM(S): 500 INJECTION, POWDER, FOR SOLUTION INTRAMUSCULAR; INTRAVENOUS at 14:10

## 2021-01-01 RX ADMIN — Medication 50 MILLILITER(S): at 17:15

## 2021-01-01 RX ADMIN — PANTOPRAZOLE SODIUM 10 MG/HR: 20 TABLET, DELAYED RELEASE ORAL at 02:11

## 2021-01-01 RX ADMIN — CHLORHEXIDINE GLUCONATE 1 APPLICATION(S): 213 SOLUTION TOPICAL at 05:12

## 2021-01-01 RX ADMIN — DEXMEDETOMIDINE HYDROCHLORIDE IN 0.9% SODIUM CHLORIDE 4.05 MICROGRAM(S)/KG/HR: 4 INJECTION INTRAVENOUS at 06:10

## 2021-01-01 RX ADMIN — HEPARIN SODIUM 5000 UNIT(S): 5000 INJECTION INTRAVENOUS; SUBCUTANEOUS at 14:34

## 2021-01-01 RX ADMIN — LACTULOSE 20 GRAM(S): 10 SOLUTION ORAL at 00:00

## 2021-01-01 RX ADMIN — Medication 50 MILLILITER(S): at 23:07

## 2021-01-01 RX ADMIN — MEROPENEM 100 MILLIGRAM(S): 1 INJECTION INTRAVENOUS at 05:08

## 2021-01-01 RX ADMIN — HYDROMORPHONE HYDROCHLORIDE 0.25 MILLIGRAM(S): 2 INJECTION INTRAMUSCULAR; INTRAVENOUS; SUBCUTANEOUS at 12:32

## 2021-01-01 RX ADMIN — Medication 50 MILLILITER(S): at 11:38

## 2021-01-01 RX ADMIN — Medication 50 MILLIEQUIVALENT(S): at 05:13

## 2021-01-01 RX ADMIN — MIDODRINE HYDROCHLORIDE 10 MILLIGRAM(S): 2.5 TABLET ORAL at 14:13

## 2021-01-01 RX ADMIN — SIMETHICONE 80 MILLIGRAM(S): 80 TABLET, CHEWABLE ORAL at 17:14

## 2021-01-01 RX ADMIN — PHENYLEPHRINE HYDROCHLORIDE 12.2 MICROGRAM(S)/KG/MIN: 10 INJECTION INTRAVENOUS at 22:37

## 2021-01-01 RX ADMIN — Medication 50 MILLIEQUIVALENT(S): at 16:37

## 2021-01-01 RX ADMIN — HYDROMORPHONE HYDROCHLORIDE 0.5 MILLIGRAM(S): 2 INJECTION INTRAMUSCULAR; INTRAVENOUS; SUBCUTANEOUS at 02:42

## 2021-01-01 RX ADMIN — Medication 2: at 05:36

## 2021-01-01 RX ADMIN — SODIUM CHLORIDE 125 MILLILITER(S): 9 INJECTION INTRAMUSCULAR; INTRAVENOUS; SUBCUTANEOUS at 20:26

## 2021-01-01 RX ADMIN — SIMETHICONE 80 MILLIGRAM(S): 80 TABLET, CHEWABLE ORAL at 05:16

## 2021-01-01 RX ADMIN — Medication 1 MILLIGRAM(S): at 11:26

## 2021-01-01 RX ADMIN — MIDODRINE HYDROCHLORIDE 10 MILLIGRAM(S): 2.5 TABLET ORAL at 05:07

## 2021-01-01 RX ADMIN — SIMETHICONE 80 MILLIGRAM(S): 80 TABLET, CHEWABLE ORAL at 17:13

## 2021-01-01 RX ADMIN — Medication 3.8 MICROGRAM(S)/KG/MIN: at 02:11

## 2021-01-01 RX ADMIN — Medication 50 MILLILITER(S): at 00:00

## 2021-01-01 RX ADMIN — Medication 200 GRAM(S): at 09:49

## 2021-01-01 RX ADMIN — DESMOPRESSIN ACETATE 224 MICROGRAM(S): 0.1 TABLET ORAL at 16:36

## 2021-01-01 RX ADMIN — SIMETHICONE 80 MILLIGRAM(S): 80 TABLET, CHEWABLE ORAL at 05:33

## 2021-01-01 RX ADMIN — QUETIAPINE FUMARATE 25 MILLIGRAM(S): 200 TABLET, FILM COATED ORAL at 11:09

## 2021-01-01 RX ADMIN — Medication 50 MILLILITER(S): at 06:00

## 2021-01-01 RX ADMIN — Medication 7.59 MICROGRAM(S)/KG/MIN: at 05:31

## 2021-01-01 RX ADMIN — CHLORHEXIDINE GLUCONATE 1 APPLICATION(S): 213 SOLUTION TOPICAL at 05:10

## 2021-01-01 RX ADMIN — SODIUM ZIRCONIUM CYCLOSILICATE 10 GRAM(S): 10 POWDER, FOR SUSPENSION ORAL at 00:25

## 2021-01-01 RX ADMIN — QUETIAPINE FUMARATE 25 MILLIGRAM(S): 200 TABLET, FILM COATED ORAL at 21:26

## 2021-01-01 RX ADMIN — MIDODRINE HYDROCHLORIDE 10 MILLIGRAM(S): 2.5 TABLET ORAL at 21:03

## 2021-01-01 RX ADMIN — Medication 3: at 17:12

## 2021-01-01 RX ADMIN — URSODIOL 500 MILLIGRAM(S): 250 TABLET, FILM COATED ORAL at 17:28

## 2021-01-01 RX ADMIN — URSODIOL 500 MILLIGRAM(S): 250 TABLET, FILM COATED ORAL at 05:05

## 2021-01-01 RX ADMIN — MIDODRINE HYDROCHLORIDE 20 MILLIGRAM(S): 2.5 TABLET ORAL at 05:07

## 2021-01-01 RX ADMIN — SODIUM CHLORIDE 100 MILLILITER(S): 9 INJECTION INTRAMUSCULAR; INTRAVENOUS; SUBCUTANEOUS at 06:18

## 2021-01-01 RX ADMIN — CHLORHEXIDINE GLUCONATE 1 APPLICATION(S): 213 SOLUTION TOPICAL at 05:06

## 2021-01-01 RX ADMIN — Medication 105 MILLIGRAM(S): at 22:00

## 2021-01-01 RX ADMIN — MIDODRINE HYDROCHLORIDE 20 MILLIGRAM(S): 2.5 TABLET ORAL at 21:25

## 2021-01-01 RX ADMIN — Medication 50 MILLILITER(S): at 06:12

## 2021-01-01 RX ADMIN — Medication 50 MILLILITER(S): at 19:07

## 2021-01-01 RX ADMIN — HYDROMORPHONE HYDROCHLORIDE 1 MILLIGRAM(S): 2 INJECTION INTRAMUSCULAR; INTRAVENOUS; SUBCUTANEOUS at 20:34

## 2021-01-01 RX ADMIN — SIMETHICONE 80 MILLIGRAM(S): 80 TABLET, CHEWABLE ORAL at 17:07

## 2021-01-01 RX ADMIN — SODIUM CHLORIDE 150 MILLILITER(S): 9 INJECTION, SOLUTION INTRAVENOUS at 01:59

## 2021-01-01 RX ADMIN — Medication 1 MILLIGRAM(S): at 21:15

## 2021-01-01 RX ADMIN — HEPARIN SODIUM 10 UNIT(S)/HR: 5000 INJECTION INTRAVENOUS; SUBCUTANEOUS at 07:38

## 2021-01-01 RX ADMIN — HYDROMORPHONE HYDROCHLORIDE 0.25 MILLIGRAM(S): 2 INJECTION INTRAMUSCULAR; INTRAVENOUS; SUBCUTANEOUS at 13:02

## 2021-01-01 RX ADMIN — Medication 62.5 MILLIMOLE(S): at 10:10

## 2021-01-01 RX ADMIN — MIDODRINE HYDROCHLORIDE 10 MILLIGRAM(S): 2.5 TABLET ORAL at 11:25

## 2021-01-01 RX ADMIN — HEPARIN SODIUM 5000 UNIT(S): 5000 INJECTION INTRAVENOUS; SUBCUTANEOUS at 14:07

## 2021-01-01 RX ADMIN — Medication 310 GRAM(S): at 15:45

## 2021-01-01 RX ADMIN — MIDODRINE HYDROCHLORIDE 20 MILLIGRAM(S): 2.5 TABLET ORAL at 05:05

## 2021-01-01 RX ADMIN — Medication 105 MILLIGRAM(S): at 22:01

## 2021-01-01 RX ADMIN — HEPARIN SODIUM 1500 UNIT(S)/HR: 5000 INJECTION INTRAVENOUS; SUBCUTANEOUS at 18:30

## 2021-01-01 RX ADMIN — Medication 0.5 MILLIGRAM(S): at 17:35

## 2021-01-01 RX ADMIN — Medication 105 MILLIGRAM(S): at 05:31

## 2021-01-01 RX ADMIN — DEXMEDETOMIDINE HYDROCHLORIDE IN 0.9% SODIUM CHLORIDE 4.05 MICROGRAM(S)/KG/HR: 4 INJECTION INTRAVENOUS at 19:30

## 2021-01-01 RX ADMIN — Medication 7.59 MICROGRAM(S)/KG/MIN: at 19:31

## 2021-01-01 RX ADMIN — Medication 100 MILLIGRAM(S): at 11:39

## 2021-01-01 RX ADMIN — Medication 44.17 GRAM(S): at 22:13

## 2021-01-01 RX ADMIN — DEXMEDETOMIDINE HYDROCHLORIDE IN 0.9% SODIUM CHLORIDE 4.05 MICROGRAM(S)/KG/HR: 4 INJECTION INTRAVENOUS at 20:02

## 2021-01-01 RX ADMIN — Medication 2: at 23:55

## 2021-01-01 RX ADMIN — Medication 40 MILLIEQUIVALENT(S): at 23:10

## 2021-01-01 RX ADMIN — Medication 105 MILLIGRAM(S): at 14:57

## 2021-01-01 RX ADMIN — Medication 1: at 17:16

## 2021-01-01 RX ADMIN — DEXMEDETOMIDINE HYDROCHLORIDE IN 0.9% SODIUM CHLORIDE 10.1 MICROGRAM(S)/KG/HR: 4 INJECTION INTRAVENOUS at 09:46

## 2021-01-01 RX ADMIN — PIPERACILLIN AND TAZOBACTAM 25 GRAM(S): 4; .5 INJECTION, POWDER, LYOPHILIZED, FOR SOLUTION INTRAVENOUS at 14:14

## 2021-01-01 RX ADMIN — SIMETHICONE 80 MILLIGRAM(S): 80 TABLET, CHEWABLE ORAL at 05:07

## 2021-01-01 RX ADMIN — DESMOPRESSIN ACETATE 220 MICROGRAM(S): 0.1 TABLET ORAL at 19:39

## 2021-01-01 RX ADMIN — Medication 32 MILLIGRAM(S): at 18:15

## 2021-01-01 RX ADMIN — HYDROMORPHONE HYDROCHLORIDE 1 MILLIGRAM(S): 2 INJECTION INTRAMUSCULAR; INTRAVENOUS; SUBCUTANEOUS at 18:54

## 2021-01-01 RX ADMIN — SODIUM ZIRCONIUM CYCLOSILICATE 10 GRAM(S): 10 POWDER, FOR SUSPENSION ORAL at 06:12

## 2021-01-01 RX ADMIN — Medication 105 MILLIGRAM(S): at 13:01

## 2021-01-01 RX ADMIN — CEFTRIAXONE 100 MILLIGRAM(S): 500 INJECTION, POWDER, FOR SOLUTION INTRAMUSCULAR; INTRAVENOUS at 13:51

## 2021-01-01 RX ADMIN — PIPERACILLIN AND TAZOBACTAM 200 GRAM(S): 4; .5 INJECTION, POWDER, LYOPHILIZED, FOR SOLUTION INTRAVENOUS at 04:53

## 2021-01-01 RX ADMIN — OCTREOTIDE ACETATE 10 MICROGRAM(S)/HR: 200 INJECTION, SOLUTION INTRAVENOUS; SUBCUTANEOUS at 19:40

## 2021-01-01 RX ADMIN — Medication 0.5 MILLIGRAM(S): at 14:10

## 2021-01-01 RX ADMIN — PIPERACILLIN AND TAZOBACTAM 25 GRAM(S): 4; .5 INJECTION, POWDER, LYOPHILIZED, FOR SOLUTION INTRAVENOUS at 21:05

## 2021-01-01 RX ADMIN — QUETIAPINE FUMARATE 25 MILLIGRAM(S): 200 TABLET, FILM COATED ORAL at 09:27

## 2021-01-01 RX ADMIN — Medication 0.25 MILLIGRAM(S): at 17:37

## 2021-01-01 RX ADMIN — CHLORHEXIDINE GLUCONATE 1 APPLICATION(S): 213 SOLUTION TOPICAL at 05:13

## 2021-01-01 RX ADMIN — OCTREOTIDE ACETATE 10 MICROGRAM(S)/HR: 200 INJECTION, SOLUTION INTRAVENOUS; SUBCUTANEOUS at 09:35

## 2021-01-01 RX ADMIN — OCTREOTIDE ACETATE 10 MICROGRAM(S)/HR: 200 INJECTION, SOLUTION INTRAVENOUS; SUBCUTANEOUS at 22:01

## 2021-01-01 RX ADMIN — CEFTRIAXONE 100 MILLIGRAM(S): 500 INJECTION, POWDER, FOR SOLUTION INTRAMUSCULAR; INTRAVENOUS at 22:37

## 2021-01-01 RX ADMIN — PIPERACILLIN AND TAZOBACTAM 25 GRAM(S): 4; .5 INJECTION, POWDER, LYOPHILIZED, FOR SOLUTION INTRAVENOUS at 05:32

## 2021-01-01 RX ADMIN — DEXMEDETOMIDINE HYDROCHLORIDE IN 0.9% SODIUM CHLORIDE 4.05 MICROGRAM(S)/KG/HR: 4 INJECTION INTRAVENOUS at 21:30

## 2021-01-01 RX ADMIN — PIPERACILLIN AND TAZOBACTAM 25 GRAM(S): 4; .5 INJECTION, POWDER, LYOPHILIZED, FOR SOLUTION INTRAVENOUS at 05:13

## 2021-01-01 RX ADMIN — Medication 100 MILLIGRAM(S): at 12:23

## 2021-01-01 RX ADMIN — Medication 44.17 GRAM(S): at 20:02

## 2021-01-01 RX ADMIN — SODIUM CHLORIDE 100 MILLILITER(S): 9 INJECTION, SOLUTION INTRAVENOUS at 07:02

## 2021-01-01 RX ADMIN — Medication 50 MILLILITER(S): at 05:32

## 2021-01-01 RX ADMIN — MIDODRINE HYDROCHLORIDE 10 MILLIGRAM(S): 2.5 TABLET ORAL at 05:33

## 2021-01-01 RX ADMIN — MIDODRINE HYDROCHLORIDE 20 MILLIGRAM(S): 2.5 TABLET ORAL at 13:51

## 2021-01-01 RX ADMIN — Medication 7.59 MICROGRAM(S)/KG/MIN: at 21:18

## 2021-01-01 RX ADMIN — Medication 44.17 GRAM(S): at 22:00

## 2021-01-01 RX ADMIN — Medication 40 MILLIEQUIVALENT(S): at 22:05

## 2021-01-01 RX ADMIN — HEPARIN SODIUM 5000 UNIT(S): 5000 INJECTION INTRAVENOUS; SUBCUTANEOUS at 21:01

## 2021-01-01 RX ADMIN — DEXMEDETOMIDINE HYDROCHLORIDE IN 0.9% SODIUM CHLORIDE 10.1 MICROGRAM(S)/KG/HR: 4 INJECTION INTRAVENOUS at 05:31

## 2021-01-01 RX ADMIN — Medication 50 MILLIEQUIVALENT(S): at 06:12

## 2021-01-01 RX ADMIN — Medication 50 MILLILITER(S): at 05:05

## 2021-01-01 RX ADMIN — CHLORHEXIDINE GLUCONATE 1 APPLICATION(S): 213 SOLUTION TOPICAL at 05:07

## 2021-01-01 RX ADMIN — Medication 1 MILLIGRAM(S): at 12:23

## 2021-01-01 RX ADMIN — QUETIAPINE FUMARATE 25 MILLIGRAM(S): 200 TABLET, FILM COATED ORAL at 21:02

## 2021-01-01 RX ADMIN — DEXMEDETOMIDINE HYDROCHLORIDE IN 0.9% SODIUM CHLORIDE 4.05 MICROGRAM(S)/KG/HR: 4 INJECTION INTRAVENOUS at 06:41

## 2021-01-01 RX ADMIN — DEXMEDETOMIDINE HYDROCHLORIDE IN 0.9% SODIUM CHLORIDE 4.05 MICROGRAM(S)/KG/HR: 4 INJECTION INTRAVENOUS at 20:40

## 2021-01-01 RX ADMIN — Medication 3: at 06:01

## 2021-01-01 RX ADMIN — Medication 2: at 06:15

## 2021-01-01 RX ADMIN — Medication 3.8 MICROGRAM(S)/KG/MIN: at 06:40

## 2021-01-01 RX ADMIN — QUETIAPINE FUMARATE 25 MILLIGRAM(S): 200 TABLET, FILM COATED ORAL at 11:25

## 2021-01-01 RX ADMIN — HEPARIN SODIUM 5000 UNIT(S): 5000 INJECTION INTRAVENOUS; SUBCUTANEOUS at 06:00

## 2021-01-01 RX ADMIN — Medication 2: at 12:28

## 2021-01-01 RX ADMIN — HEPARIN SODIUM 5000 UNIT(S): 5000 INJECTION INTRAVENOUS; SUBCUTANEOUS at 22:00

## 2021-01-01 RX ADMIN — Medication 125 MILLILITER(S): at 21:18

## 2021-01-01 RX ADMIN — DEXMEDETOMIDINE HYDROCHLORIDE IN 0.9% SODIUM CHLORIDE 4.05 MICROGRAM(S)/KG/HR: 4 INJECTION INTRAVENOUS at 23:45

## 2021-01-01 RX ADMIN — DEXMEDETOMIDINE HYDROCHLORIDE IN 0.9% SODIUM CHLORIDE 4.05 MICROGRAM(S)/KG/HR: 4 INJECTION INTRAVENOUS at 17:16

## 2021-01-01 RX ADMIN — MIDODRINE HYDROCHLORIDE 10 MILLIGRAM(S): 2.5 TABLET ORAL at 14:50

## 2021-01-01 RX ADMIN — DEXMEDETOMIDINE HYDROCHLORIDE IN 0.9% SODIUM CHLORIDE 4.05 MICROGRAM(S)/KG/HR: 4 INJECTION INTRAVENOUS at 21:21

## 2021-01-01 RX ADMIN — URSODIOL 500 MILLIGRAM(S): 250 TABLET, FILM COATED ORAL at 06:07

## 2021-01-01 RX ADMIN — HEPARIN SODIUM 0 UNIT(S)/HR: 5000 INJECTION INTRAVENOUS; SUBCUTANEOUS at 01:53

## 2021-01-01 RX ADMIN — Medication 50 MILLILITER(S): at 11:29

## 2021-01-01 RX ADMIN — PANTOPRAZOLE SODIUM 40 MILLIGRAM(S): 20 TABLET, DELAYED RELEASE ORAL at 18:15

## 2021-01-01 RX ADMIN — QUETIAPINE FUMARATE 25 MILLIGRAM(S): 200 TABLET, FILM COATED ORAL at 21:23

## 2021-01-01 RX ADMIN — Medication 7.59 MICROGRAM(S)/KG/MIN: at 06:10

## 2021-01-01 RX ADMIN — Medication 40 MILLIEQUIVALENT(S): at 06:32

## 2021-01-01 RX ADMIN — MEROPENEM 100 MILLIGRAM(S): 1 INJECTION INTRAVENOUS at 13:06

## 2021-01-01 RX ADMIN — Medication 100 GRAM(S): at 21:55

## 2021-01-01 RX ADMIN — Medication 1: at 12:54

## 2021-01-01 RX ADMIN — Medication 1 MILLIGRAM(S): at 11:25

## 2021-01-01 RX ADMIN — Medication 650 MILLIGRAM(S): at 21:03

## 2021-01-01 RX ADMIN — SODIUM CHLORIDE 100 MILLILITER(S): 9 INJECTION INTRAMUSCULAR; INTRAVENOUS; SUBCUTANEOUS at 20:32

## 2021-01-01 RX ADMIN — Medication 7.59 MICROGRAM(S)/KG/MIN: at 00:55

## 2021-01-01 RX ADMIN — CHLORHEXIDINE GLUCONATE 1 APPLICATION(S): 213 SOLUTION TOPICAL at 05:34

## 2021-01-01 RX ADMIN — Medication 2: at 23:56

## 2021-01-01 RX ADMIN — SIMETHICONE 80 MILLIGRAM(S): 80 TABLET, CHEWABLE ORAL at 05:06

## 2021-01-01 RX ADMIN — URSODIOL 500 MILLIGRAM(S): 250 TABLET, FILM COATED ORAL at 05:33

## 2021-01-01 RX ADMIN — PHENYLEPHRINE HYDROCHLORIDE 15.2 MICROGRAM(S)/KG/MIN: 10 INJECTION INTRAVENOUS at 02:10

## 2021-01-01 RX ADMIN — CHLORHEXIDINE GLUCONATE 1 APPLICATION(S): 213 SOLUTION TOPICAL at 05:32

## 2021-01-01 RX ADMIN — VASOPRESSIN 2.4 UNIT(S)/MIN: 20 INJECTION INTRAVENOUS at 03:10

## 2021-01-01 RX ADMIN — Medication 1 MILLIGRAM(S): at 11:39

## 2021-01-01 RX ADMIN — Medication 50 MILLILITER(S): at 17:23

## 2021-01-01 RX ADMIN — MIDODRINE HYDROCHLORIDE 20 MILLIGRAM(S): 2.5 TABLET ORAL at 05:18

## 2021-01-01 RX ADMIN — MIDODRINE HYDROCHLORIDE 20 MILLIGRAM(S): 2.5 TABLET ORAL at 13:02

## 2021-01-01 RX ADMIN — URSODIOL 500 MILLIGRAM(S): 250 TABLET, FILM COATED ORAL at 18:27

## 2021-01-01 RX ADMIN — OCTREOTIDE ACETATE 10 MICROGRAM(S)/HR: 200 INJECTION, SOLUTION INTRAVENOUS; SUBCUTANEOUS at 05:05

## 2021-01-01 RX ADMIN — QUETIAPINE FUMARATE 25 MILLIGRAM(S): 200 TABLET, FILM COATED ORAL at 09:33

## 2021-01-01 RX ADMIN — Medication 1: at 05:52

## 2021-01-01 RX ADMIN — DEXMEDETOMIDINE HYDROCHLORIDE IN 0.9% SODIUM CHLORIDE 4.05 MICROGRAM(S)/KG/HR: 4 INJECTION INTRAVENOUS at 22:37

## 2021-01-01 RX ADMIN — URSODIOL 500 MILLIGRAM(S): 250 TABLET, FILM COATED ORAL at 18:20

## 2021-01-01 RX ADMIN — Medication 1 MILLIGRAM(S): at 11:57

## 2021-01-01 RX ADMIN — QUETIAPINE FUMARATE 25 MILLIGRAM(S): 200 TABLET, FILM COATED ORAL at 09:50

## 2021-01-01 RX ADMIN — HYDROMORPHONE HYDROCHLORIDE 1 MILLIGRAM(S): 2 INJECTION INTRAMUSCULAR; INTRAVENOUS; SUBCUTANEOUS at 18:15

## 2021-01-01 RX ADMIN — Medication 20 MILLIEQUIVALENT(S): at 11:30

## 2021-01-01 RX ADMIN — Medication 650 MILLIGRAM(S): at 23:00

## 2021-01-01 RX ADMIN — Medication 50 MILLILITER(S): at 00:10

## 2021-01-01 RX ADMIN — Medication 650 MILLIGRAM(S): at 03:13

## 2021-01-01 RX ADMIN — Medication 0.5 MILLIGRAM(S): at 21:05

## 2021-01-01 RX ADMIN — POTASSIUM PHOSPHATE, MONOBASIC POTASSIUM PHOSPHATE, DIBASIC 62.5 MILLIMOLE(S): 236; 224 INJECTION, SOLUTION INTRAVENOUS at 16:10

## 2021-01-01 RX ADMIN — Medication 100 MILLIEQUIVALENT(S): at 18:20

## 2021-01-01 RX ADMIN — Medication 50 MILLILITER(S): at 05:12

## 2021-01-01 RX ADMIN — PIPERACILLIN AND TAZOBACTAM 25 GRAM(S): 4; .5 INJECTION, POWDER, LYOPHILIZED, FOR SOLUTION INTRAVENOUS at 21:02

## 2021-01-01 RX ADMIN — LACTULOSE 20 GRAM(S): 10 SOLUTION ORAL at 17:39

## 2021-01-01 RX ADMIN — LACTULOSE 30 GRAM(S): 10 SOLUTION ORAL at 23:10

## 2021-01-01 RX ADMIN — Medication 100 MILLIGRAM(S): at 11:09

## 2021-01-01 RX ADMIN — URSODIOL 500 MILLIGRAM(S): 250 TABLET, FILM COATED ORAL at 17:14

## 2021-01-01 RX ADMIN — Medication 105 MILLIGRAM(S): at 13:02

## 2021-01-01 RX ADMIN — Medication 7.59 MICROGRAM(S)/KG/MIN: at 21:21

## 2021-01-01 RX ADMIN — Medication 3.8 MICROGRAM(S)/KG/MIN: at 12:32

## 2021-01-01 RX ADMIN — URSODIOL 500 MILLIGRAM(S): 250 TABLET, FILM COATED ORAL at 17:13

## 2021-01-01 RX ADMIN — MIDODRINE HYDROCHLORIDE 10 MILLIGRAM(S): 2.5 TABLET ORAL at 11:39

## 2021-01-01 RX ADMIN — MIDODRINE HYDROCHLORIDE 10 MILLIGRAM(S): 2.5 TABLET ORAL at 21:02

## 2021-01-01 RX ADMIN — Medication 3.8 MICROGRAM(S)/KG/MIN: at 20:30

## 2021-01-01 RX ADMIN — DEXMEDETOMIDINE HYDROCHLORIDE IN 0.9% SODIUM CHLORIDE 4.05 MICROGRAM(S)/KG/HR: 4 INJECTION INTRAVENOUS at 23:09

## 2021-01-01 RX ADMIN — Medication 50 MILLILITER(S): at 18:23

## 2021-01-01 RX ADMIN — LACTULOSE 20 GRAM(S): 10 SOLUTION ORAL at 23:12

## 2021-01-01 RX ADMIN — Medication 0.5 MILLIGRAM(S): at 00:53

## 2021-01-01 RX ADMIN — HEPARIN SODIUM 5000 UNIT(S): 5000 INJECTION INTRAVENOUS; SUBCUTANEOUS at 09:36

## 2021-01-01 RX ADMIN — Medication 50 MILLIEQUIVALENT(S): at 01:51

## 2021-01-01 RX ADMIN — CHLORHEXIDINE GLUCONATE 1 APPLICATION(S): 213 SOLUTION TOPICAL at 06:19

## 2021-01-01 RX ADMIN — URSODIOL 500 MILLIGRAM(S): 250 TABLET, FILM COATED ORAL at 05:06

## 2021-01-01 RX ADMIN — CEFTRIAXONE 100 MILLIGRAM(S): 500 INJECTION, POWDER, FOR SOLUTION INTRAMUSCULAR; INTRAVENOUS at 21:00

## 2021-01-01 RX ADMIN — Medication 130.13 GRAM(S): at 18:00

## 2021-01-01 RX ADMIN — Medication 100 MEQ/KG/HR: at 06:18

## 2021-01-01 RX ADMIN — URSODIOL 500 MILLIGRAM(S): 250 TABLET, FILM COATED ORAL at 17:37

## 2021-01-01 RX ADMIN — Medication 1: at 11:39

## 2021-01-01 RX ADMIN — SIMETHICONE 80 MILLIGRAM(S): 80 TABLET, CHEWABLE ORAL at 11:09

## 2021-01-01 RX ADMIN — CHLORHEXIDINE GLUCONATE 1 APPLICATION(S): 213 SOLUTION TOPICAL at 05:17

## 2021-01-01 RX ADMIN — Medication 250 MILLIGRAM(S): at 13:06

## 2021-01-01 RX ADMIN — POTASSIUM PHOSPHATE, MONOBASIC POTASSIUM PHOSPHATE, DIBASIC 83.33 MILLIMOLE(S): 236; 224 INJECTION, SOLUTION INTRAVENOUS at 22:00

## 2021-01-01 RX ADMIN — Medication 0.5 MILLIGRAM(S): at 18:26

## 2021-01-01 RX ADMIN — LACTULOSE 20 GRAM(S): 10 SOLUTION ORAL at 06:00

## 2021-01-01 RX ADMIN — MEROPENEM 100 MILLIGRAM(S): 1 INJECTION INTRAVENOUS at 13:42

## 2021-01-01 RX ADMIN — QUETIAPINE FUMARATE 25 MILLIGRAM(S): 200 TABLET, FILM COATED ORAL at 21:11

## 2021-01-01 RX ADMIN — Medication 50 MILLILITER(S): at 00:08

## 2021-01-01 RX ADMIN — MIDODRINE HYDROCHLORIDE 10 MILLIGRAM(S): 2.5 TABLET ORAL at 05:11

## 2021-01-01 RX ADMIN — Medication 7.59 MICROGRAM(S)/KG/MIN: at 17:16

## 2021-01-01 RX ADMIN — PANTOPRAZOLE SODIUM 10 MG/HR: 20 TABLET, DELAYED RELEASE ORAL at 21:32

## 2021-01-01 RX ADMIN — Medication 100 MEQ/KG/HR: at 16:00

## 2021-01-01 RX ADMIN — Medication 32 MILLIGRAM(S): at 09:35

## 2021-01-01 RX ADMIN — Medication 100 GRAM(S): at 00:10

## 2021-01-01 RX ADMIN — INSULIN HUMAN 10 UNIT(S): 100 INJECTION, SOLUTION SUBCUTANEOUS at 06:12

## 2021-01-01 RX ADMIN — Medication 650 MILLIGRAM(S): at 05:00

## 2021-01-01 RX ADMIN — MIDODRINE HYDROCHLORIDE 20 MILLIGRAM(S): 2.5 TABLET ORAL at 21:23

## 2021-01-01 RX ADMIN — CHLORHEXIDINE GLUCONATE 1 APPLICATION(S): 213 SOLUTION TOPICAL at 05:09

## 2021-01-01 RX ADMIN — QUETIAPINE FUMARATE 25 MILLIGRAM(S): 200 TABLET, FILM COATED ORAL at 21:04

## 2021-01-01 RX ADMIN — QUETIAPINE FUMARATE 25 MILLIGRAM(S): 200 TABLET, FILM COATED ORAL at 11:39

## 2021-01-01 RX ADMIN — URSODIOL 500 MILLIGRAM(S): 250 TABLET, FILM COATED ORAL at 05:07

## 2021-01-01 RX ADMIN — Medication 62.5 MILLIMOLE(S): at 18:24

## 2021-01-01 RX ADMIN — Medication 7.59 MICROGRAM(S)/KG/MIN: at 08:25

## 2021-01-01 RX ADMIN — Medication 1 MILLIGRAM(S): at 11:10

## 2021-01-01 RX ADMIN — CEFTRIAXONE 100 MILLIGRAM(S): 500 INJECTION, POWDER, FOR SOLUTION INTRAMUSCULAR; INTRAVENOUS at 21:02

## 2021-01-01 RX ADMIN — DEXMEDETOMIDINE HYDROCHLORIDE IN 0.9% SODIUM CHLORIDE 4.05 MICROGRAM(S)/KG/HR: 4 INJECTION INTRAVENOUS at 02:10

## 2021-01-01 RX ADMIN — SIMETHICONE 80 MILLIGRAM(S): 80 TABLET, CHEWABLE ORAL at 17:22

## 2021-01-01 RX ADMIN — HYDROMORPHONE HYDROCHLORIDE 1 MILLIGRAM(S): 2 INJECTION INTRAMUSCULAR; INTRAVENOUS; SUBCUTANEOUS at 21:04

## 2021-01-01 RX ADMIN — Medication 100 MILLIGRAM(S): at 11:25

## 2021-01-01 RX ADMIN — SIMETHICONE 80 MILLIGRAM(S): 80 TABLET, CHEWABLE ORAL at 05:11

## 2021-01-01 RX ADMIN — Medication 44.17 GRAM(S): at 21:04

## 2021-01-01 RX ADMIN — Medication 105 MILLIGRAM(S): at 06:00

## 2021-01-01 RX ADMIN — Medication 100 MILLIGRAM(S): at 11:24

## 2021-01-01 RX ADMIN — Medication 200 GRAM(S): at 20:21

## 2021-01-01 RX ADMIN — Medication 1 MILLIGRAM(S): at 14:10

## 2021-01-01 RX ADMIN — Medication 7.59 MICROGRAM(S)/KG/MIN: at 06:20

## 2021-01-01 RX ADMIN — Medication 1: at 17:08

## 2021-01-01 RX ADMIN — Medication 50 MILLILITER(S): at 05:07

## 2021-01-01 RX ADMIN — Medication 50 MILLILITER(S): at 13:03

## 2021-01-01 RX ADMIN — Medication 62.5 MILLIMOLE(S): at 03:47

## 2021-01-01 RX ADMIN — Medication 50 MILLIEQUIVALENT(S): at 03:12

## 2021-01-01 RX ADMIN — Medication 50 MILLILITER(S): at 16:37

## 2021-01-01 RX ADMIN — MIDODRINE HYDROCHLORIDE 10 MILLIGRAM(S): 2.5 TABLET ORAL at 05:06

## 2021-01-01 RX ADMIN — URSODIOL 500 MILLIGRAM(S): 250 TABLET, FILM COATED ORAL at 17:07

## 2021-01-22 PROBLEM — K74.60 CIRRHOSIS: Status: ACTIVE | Noted: 2019-01-25

## 2021-01-22 PROBLEM — E78.5 HYPERLIPIDEMIA: Status: ACTIVE | Noted: 2018-02-28

## 2021-01-22 PROBLEM — Z12.39 BREAST CANCER SCREENING: Status: ACTIVE | Noted: 2021-01-01

## 2021-01-22 PROBLEM — Z13.31 DEPRESSION SCREENING: Status: ACTIVE | Noted: 2021-01-01

## 2021-01-22 PROBLEM — R79.89 ELEVATED LFTS: Status: ACTIVE | Noted: 2019-01-25

## 2021-01-22 PROBLEM — D64.9 ANEMIA: Status: ACTIVE | Noted: 2020-01-01

## 2021-01-22 PROBLEM — R29.898 WEAKNESS OF BOTH LOWER EXTREMITIES: Status: ACTIVE | Noted: 2021-01-01

## 2021-01-22 PROBLEM — M25.561 BILATERAL KNEE PAIN: Status: ACTIVE | Noted: 2021-01-01

## 2021-01-22 PROBLEM — M54.2 NECK PAIN: Status: ACTIVE | Noted: 2020-02-07

## 2021-01-22 PROBLEM — Z13.820 OSTEOPOROSIS SCREENING: Status: ACTIVE | Noted: 2021-01-01

## 2021-01-22 NOTE — REVIEW OF SYSTEMS
[Fatigue] : fatigue [Vision Problems] : vision problems [Joint Swelling] : joint swelling [Muscle Pain] : muscle pain [Fever] : no fever [Chills] : no chills [Hot Flashes] : no hot flashes [Discharge] : no discharge [Pain] : no pain [Redness] : no redness [Itching] : no itching [Earache] : no earache [Hearing Loss] : no hearing loss [Sore Throat] : no sore throat [Chest Pain] : no chest pain [Palpitations] : no palpitations [Lower Ext Edema] : no lower extremity edema [Paroxysmal Nocturnal Dyspnea] : no paroxysmal nocturnal dyspnea [Shortness Of Breath] : no shortness of breath [Wheezing] : no wheezing [Dyspnea on Exertion] : no dyspnea on exertion [Abdominal Pain] : no abdominal pain [Nausea] : no nausea [Constipation] : no constipation [Diarrhea] : diarrhea [Vomiting] : no vomiting [Heartburn] : no heartburn [Melena] : no melena [Dysuria] : no dysuria [Incontinence] : no incontinence [Hematuria] : no hematuria [Frequency] : no frequency [Joint Pain] : no joint pain [Joint Stiffness] : no joint stiffness [Itching] : no itching [Skin Rash] : no skin rash [Headache] : no headache [Dizziness] : no dizziness [Memory Loss] : no memory loss [Anxiety] : no anxiety [Depression] : no depression [Easy Bleeding] : no easy bleeding [Easy Bruising] : no easy bruising [FreeTextEntry9] : b/l pain behind knees; b/l knee swelling [FreeTextEntry3] : right blurry eye

## 2021-01-22 NOTE — HEALTH RISK ASSESSMENT
[Good] : ~his/her~  mood as  good [No] : In the past 12 months have you used drugs other than those required for medical reasons? No [No falls in past year] : Patient reported no falls in the past year [0] : 2) Feeling down, depressed, or hopeless: Not at all (0) [Patient reported mammogram was normal] : Patient reported mammogram was normal [Patient reported PAP Smear was normal] : Patient reported PAP Smear was normal [Patient reported colonoscopy was normal] : Patient reported colonoscopy was normal [HIV test declined] : HIV test declined [Hepatitis C test declined] : Hepatitis C test declined [None] : None [With Family] : lives with family [# of Members in Household ___] :  household currently consist of [unfilled] member(s) [Employed] : employed [] :  [Sexually Active] : sexually active [Feels Safe at Home] : Feels safe at home [Fully functional (bathing, dressing, toileting, transferring, walking, feeding)] : Fully functional (bathing, dressing, toileting, transferring, walking, feeding) [Fully functional (using the telephone, shopping, preparing meals, housekeeping, doing laundry, using] : Fully functional and needs no help or supervision to perform IADLs (using the telephone, shopping, preparing meals, housekeeping, doing laundry, using transportation, managing medications and managing finances) [Reports changes in vision] : Reports changes in vision [Smoke Detector] : smoke detector [Carbon Monoxide Detector] : carbon monoxide detector [Seat Belt] :  uses seat belt [With Patient/Caregiver] : With Patient/Caregiver [FreeTextEntry1] : Liver [] : No [de-identified] : sober for 5 months (8/2020) [de-identified] : tries to walk [de-identified] : balanced diet [GSC1Mfeva] : 0 [Language] : denies difficulty with language [Behavior] : denies difficulty with behavior [Handling Complex Tasks] : denies difficulty handling complex tasks [Reasoning] : denies difficulty with reasoning [Reports changes in hearing] : Reports no changes in hearing [Reports changes in dental health] : Reports no changes in dental health [Guns at Home] : no guns at home [MammogramDate] : 2019 [PapSmearDate] : 2019 [ColonoscopyDate] : 2020 [AdvancecareDate] : 01/21

## 2021-01-22 NOTE — ASSESSMENT
[FreeTextEntry1] : 1. Health Maintenance: \par - labs ordered\par - patient given mammo script\par - Patient to follow up with gynecologist for pap and optho for eye exam\par - DEXA ordered for osteoporosis screening \par - flu shot up to date\par \par 2. B/l Knee Pain\par - likely 2/2 weak quads/hamstrings\par - given script for physical exam\par \par 3. Weakness of both Lower Extremities:\par - given script for physical exam\par \par 4. Hyperlipidemia \par - c/w statin\par - f/u on lipid panel\par \par 5. Cirrhosis\par - Follow regularly with GI\par - 5 months sober\par - per patient, most recent CT scan done was improving\par - on lasix to prevent ascites\par - continue spironolactone and propranolol \par \par 6. Anemia\par - per patient has now resolved\par - saw hematology\par - had normal bone marrow biopsy\par \par Gabapentin for chronic pain

## 2021-01-22 NOTE — PHYSICAL EXAM
[Well Nourished] : well nourished [Well Developed] : well developed [Well-Appearing] : well-appearing [Normal Sclera/Conjunctiva] : normal sclera/conjunctiva [PERRL] : pupils equal round and reactive to light [EOMI] : extraocular movements intact [Normal Outer Ear/Nose] : the outer ears and nose were normal in appearance [Normal Oropharynx] : the oropharynx was normal [No Lymphadenopathy] : no lymphadenopathy [Supple] : supple [No Respiratory Distress] : no respiratory distress  [No Accessory Muscle Use] : no accessory muscle use [Clear to Auscultation] : lungs were clear to auscultation bilaterally [Normal Rate] : normal rate  [Regular Rhythm] : with a regular rhythm [Normal S1, S2] : normal S1 and S2 [No Edema] : there was no peripheral edema [No Extremity Clubbing/Cyanosis] : no extremity clubbing/cyanosis [Soft] : abdomen soft [Non Tender] : non-tender [Non-distended] : non-distended [Normal Bowel Sounds] : normal bowel sounds [Normal Posterior Cervical Nodes] : no posterior cervical lymphadenopathy [Normal Anterior Cervical Nodes] : no anterior cervical lymphadenopathy [No Joint Swelling] : no joint swelling [Normal Gait] : normal gait [Normal Affect] : the affect was normal [Normal Insight/Judgement] : insight and judgment were intact

## 2021-01-22 NOTE — HISTORY OF PRESENT ILLNESS
[FreeTextEntry1] : Physical [de-identified] : Patient presents today for physical. States she is feeling well today. Follows with GI who recently ordered a abd CT scan to visualize liver-- wnl. Pt states she had bone marrow biopsy 12/2020--wnl; Hematologist ordered is for enlarged spleen. Pt has history of ascites and had fluid removed about 3 months ago. Has to follow up with opthalmology for blurry vision in right eye. Will make visit to dentist. Pt's last pap over a year ago was normal at that time; will make appointment for gynecologist this year. Last Mammo over a year ago and is due now. States she wants a bone density test. States b/l knees will occasionally go out and has some swelling; started 3 months ago.

## 2021-02-05 PROBLEM — E87.5 SERUM POTASSIUM ELEVATED: Status: ACTIVE | Noted: 2021-01-01

## 2021-03-03 PROBLEM — Z01.818 PRE-OP EXAMINATION: Status: ACTIVE | Noted: 2019-11-26

## 2021-03-03 NOTE — PLAN
[FreeTextEntry1] : -medically optimized low risk for low risk procedure (R eye cataract sx on 3/9) \par -EKG NSR rate 84 bpm, nonspecific ST changes \par -BP controlled, continue medication at current dose\par -HLD controlled, continue statin at current dose, continue diet control and exercise\par -no hx of cardiac disease \par -able to achieve 4 METS, tolerated anesthesia well in the past \par

## 2021-03-03 NOTE — PHYSICAL EXAM
[Normal] : no rash [No Joint Swelling] : no joint swelling [Normal Sclera/Conjunctiva] : normal sclera/conjunctiva [PERRL] : pupils equal round and reactive to light [EOMI] : extraocular movements intact [de-identified] : b [de-identified] : k

## 2021-03-03 NOTE — HISTORY OF PRESENT ILLNESS
[FreeTextEntry8] : 54 YO F PMHX cirrhosis, anxiety, HTN, HLD, GERD, here for surgical clearance for R cataract on March 9, then left cataract in April 1, to be done by Dr. Jennings. Pt was having blurred vision x 2 months especially while driving in the evening. GI  is Dr. Lind, sees him regularly, on 2 water pills for approximately the last 3 months. Pt tolerated anesthesia well in the past. Able to achieve 4 METS. Pt denies fever, chills, CP, SOB abdominal pain, abdominal swelling, edema.

## 2021-03-03 NOTE — REVIEW OF SYSTEMS
[Anxiety] : anxiety [Vision Problems] : vision problems [Negative] : ENT [Discharge] : no discharge [Pain] : no pain [Redness] : no redness [Dryness] : no dryness  [Itching] : no itching [Suicidal] : not suicidal [Insomnia] : no insomnia [Depression] : no depression [FreeTextEntry3] : blurred vision  [FreeTextEntry9] : b/l knee pain and instability

## 2021-04-02 PROBLEM — Z01.818 PRE-OP EXAM: Status: ACTIVE | Noted: 2021-01-01

## 2021-04-02 PROBLEM — F41.9 ANXIETY: Status: ACTIVE | Noted: 2019-12-19

## 2021-04-02 PROBLEM — H26.9 CATARACT, LEFT: Status: ACTIVE | Noted: 2021-01-01

## 2021-04-02 NOTE — ASSESSMENT
[Patient Optimized for Surgery] : Patient optimized for surgery [No Further Testing Recommended] : no further testing recommended [As per surgery] : as per surgery [FreeTextEntry4] : Patient is here today for pre-op clearance for cataract removal.\par Her EKG done in March was sinus rhythm, no acute ischemic changes\par Patient is medically optimized for procedure.

## 2021-04-02 NOTE — HISTORY OF PRESENT ILLNESS
[No Pertinent Pulmonary History] : no history of asthma, COPD, sleep apnea, or smoking [No Adverse Anesthesia Reaction] : no adverse anesthesia reaction in self or family member [No Pertinent Cardiac History] : no history of aortic stenosis, atrial fibrillation, coronary artery disease, recent myocardial infarction, or implantable device/pacemaker [(Patient denies any chest pain, claudication, dyspnea on exertion, orthopnea, palpitations or syncope)] : Patient denies any chest pain, claudication, dyspnea on exertion, orthopnea, palpitations or syncope [Good (7-10 METs)] : Good (7-10 METs) [Chronic Anticoagulation] : no chronic anticoagulation [Chronic Kidney Disease] : no chronic kidney disease [Diabetes] : no diabetes [FreeTextEntry1] : left cataract removal  [FreeTextEntry2] : 4/13/2021 [FreeTextEntry3] : Dr. Jennings  [FreeTextEntry4] : Patient is here today for clearance for left cataract surgery.  She had her right eye done last month and it was very successful. \par No complications with right eye procedure. She is having the procedure done at the [facility] [place].

## 2021-04-02 NOTE — PHYSICAL EXAM
[No Acute Distress] : no acute distress [Well Nourished] : well nourished [Well Developed] : well developed [Normal Sclera/Conjunctiva] : normal sclera/conjunctiva [PERRL] : pupils equal round and reactive to light [Normal Outer Ear/Nose] : the outer ears and nose were normal in appearance [Normal Oropharynx] : the oropharynx was normal [No Lymphadenopathy] : no lymphadenopathy [Supple] : supple [No Respiratory Distress] : no respiratory distress  [No Accessory Muscle Use] : no accessory muscle use [Clear to Auscultation] : lungs were clear to auscultation bilaterally [Normal Rate] : normal rate  [Regular Rhythm] : with a regular rhythm [Normal S1, S2] : normal S1 and S2 [No Edema] : there was no peripheral edema [Soft] : abdomen soft [Non Tender] : non-tender [Non-distended] : non-distended [Normal Bowel Sounds] : normal bowel sounds [Grossly Normal Strength/Tone] : grossly normal strength/tone [No Rash] : no rash [No Focal Deficits] : no focal deficits [Normal Gait] : normal gait [Normal Affect] : the affect was normal [Normal Insight/Judgement] : insight and judgment were intact

## 2021-08-28 NOTE — ED PROVIDER NOTE - CONSTITUTIONAL, MLM
normal... Middle aged female appears jaundiced, laying in bed, awake, alert, oriented to person, place, time/situation and in no apparent distress.

## 2021-08-28 NOTE — ED PROVIDER NOTE - PROGRESS NOTE DETAILS
Gita Mcneill : discussed with hospitalist who asked for ICU consult. ICU contacted at this time. Gita Mcneill : discussed with Dr. Patiño, nephrology, attempted to get hold of Dr. López who is not on call. Dr. Garza will speak with Dr. López and Dr. Patiño will come see pt. ICU consulted. Gita Mcneill : delay in admission as nephrologist and GI trying to find out if pt would  benefit from a transfer to liver transplant facility. ICU coming to evaluate pt. GI and nephrologist discussing case

## 2021-08-28 NOTE — ED ADULT NURSE REASSESSMENT NOTE - NS ED NURSE REASSESS COMMENT FT1
Attempted to medicate pt at this time, pt is not in room and in ultrasound at this time, will medicate when pt returns to ed.

## 2021-08-28 NOTE — CONSULT NOTE ADULT - SUBJECTIVE AND OBJECTIVE BOX
NEPHROLOGY CONSULT  HPI:  57 yo f h/o cirrhosis, due to ETOH use was told to come to ED by Dr Williams López due to abnormal labs.  Pt was found to have Na 114, asymptomatic, creat 16 and bilirubin 28  Pt states her last drink was 2 and a half weeks ago, and as per dr López approx 1 month ago creat was wnl and other labs were within baseline.  Pt states she has 5-6 drinks a day past 20 yrs      PAST MEDICAL & SURGICAL HISTORY:  Anxiety    GERD (gastroesophageal reflux disease)    Constipation    Fatty liver    IBS (irritable bowel syndrome)    Liver cirrhosis    HLD (hyperlipidemia)    Claustrophobia    ETOH abuse    Lipoma    H/O colonoscopy    H/O endoscopy    Lipoma of back  2019        FAMILY HISTORY:  Family hx of colon cancer  father    FHx: breast cancer  mother    FH: CAD (coronary artery disease)  father    Family history of stroke  father    Family history of Parkinsonism  father        MEDICATIONS  (STANDING):  chlorhexidine 4% Liquid 1 Application(s) Topical <User Schedule>  sodium chloride 0.9%. 1000 milliLiter(s) (100 mL/Hr) IV Continuous <Continuous>    MEDICATIONS  (PRN):  LORazepam   Injectable 2 milliGRAM(s) IV Push every 1 hour PRN CIWA-Ar score 8 or greater      Allergies    No Known Allergies    Intolerances        I&O's Summary        REVIEW OF SYSTEMS:    CONSTITUTIONAL:  As per HPI.  CONSTITUTIONAL: No weakness, fevers or chills  EYES/ENT: icteric  NECK: No pain or stiffness  CARDIOVASCULAR: No chest pain or palpitations  GASTROINTESTINAL: No abdominal or epigastric pain. No nausea, vomiting, or hematemesis; No diarrhea or constipation. No melena or hematochezia.  GENITOURINARY: No dysuria, frequency or hematuria  NEUROLOGICAL: No numbness or weakness  SKIN: jaundice  All other review of systems is negative unless indicated above      Vital Signs Last 24 Hrs  T(C): 36.6 (28 Aug 2021 19:32), Max: 36.6 (28 Aug 2021 19:32)  T(F): 97.9 (28 Aug 2021 19:32), Max: 97.9 (28 Aug 2021 19:32)  HR: 66 (28 Aug 2021 15:31) (66 - 66)  BP: 117/67 (28 Aug 2021 19:32) (105/60 - 117/67)  BP(mean): 71 (28 Aug 2021 15:31) (71 - 71)  RR: 18 (28 Aug 2021 19:32) (18 - 20)  SpO2: 98% (28 Aug 2021 19:32) (98% - 99%)  Daily Height in cm: 160.02 (28 Aug 2021 15:28)    Daily   I&O's Summary      PHYSICAL EXAM:    General:  Alert, jaundice    Neuro:  Alert and oriented to person, place, and time.       HEENT:  No JVD, no masses, Eyes anicteric, No carotid bruits.No lymphadenopathy,    Cardiovascular:  Regular rate and rhythm, with normal S1 and S2. No murmurs, rubs,  or gallops. No JVD.     Lungs:  clear. no rales, no wheezing, .    Abdomen:  Normoactive bowel sounds. Soft, flat, non-tender, and non-distended.  No hepatosplenomegaly, positive bowel sounds    Skin:  Warm, dry, well-perfused. No rashes or other lesions.     Extremities:  no edema    LABS:                        10.1   11.89 )-----------( 154      ( 28 Aug 2021 16:24 )             27.7     08-28    115<LL>  |  75<L>  |  150<H>  ----------------------------<  126<H>  3.3<L>   |  16<L>  |  16.20<H>    Ca    6.9<L>      28 Aug 2021 17:39  Phos  9.1     08-28  Mg     2.6     08-28    TPro  7.8  /  Alb  2.5<L>  /  TBili  27.1<H>  /  DBili  23.0<H>  /  AST  205<H>  /  ALT  113<H>  /  AlkPhos  306<H>  08-28    PT/INR - ( 28 Aug 2021 17:39 )   PT: 17.4 sec;   INR: 1.52 ratio         PTT - ( 28 Aug 2021 17:39 )  PTT:44.6 sec    Magnesium, Serum: 2.6 mg/dL (08-28 @ 17:39)  Phosphorus Level, Serum: 9.1 mg/dL (08-28 @ 17:39)

## 2021-08-28 NOTE — PROVIDER CONTACT NOTE (CRITICAL VALUE NOTIFICATION) - SITUATION
Pt is still in U/S at this time, unable to medicate pt,  notified of sodium level, will continue to monitor.

## 2021-08-28 NOTE — H&P ADULT - NSICDXPASTMEDICALHX_GEN_ALL_CORE_FT
PAST MEDICAL HISTORY:  Anxiety     Claustrophobia     Constipation     ETOH abuse     Fatty liver     GERD (gastroesophageal reflux disease)     HLD (hyperlipidemia)     IBS (irritable bowel syndrome)     Lipoma     Liver cirrhosis

## 2021-08-28 NOTE — PROVIDER CONTACT NOTE (EICU) - BACKGROUND
56F with a history of ETOH cirrhosis, GERD, anxiety, IBS sent to ED for TBili 27.1  Cr was found to be 16.3, Na 114, Cr 1.52, K 4.4  MELD 38  As per ICU PA, pt remains awake, alert

## 2021-08-28 NOTE — ED PROVIDER NOTE - OBJECTIVE STATEMENT
55 y/o F with a PMHx of lipoma, ETOH abuse, claustrophobia, HLD, liver cirrhosis, IBS, fatty liver, constipation, GERD,  anxiety presents to the ED after being sent in by Dr. López since pt had a bilirubin level of 28 and appears jaundiced. Pt states the last ETOH drink was 2.5 weeks ago. Reports feeling anxious and having body shakes. Pt also c/o abd bloating. Pt reports she started drinking ETOH after her father passed away. Also c/o having very loose stools. Pt reports she recently started taking Xanax

## 2021-08-28 NOTE — H&P ADULT - NSHPSOCIALHISTORY_GEN_ALL_CORE
From home, lives with  and one of her sons.  Started drinking again after her father , drinks about 6 liquor drinks, when asked how much liquor she adds "they're strong".  Non smoker, no illicit drug use.

## 2021-08-28 NOTE — ED PROVIDER NOTE - CLINICAL SUMMARY MEDICAL DECISION MAKING FREE TEXT BOX
55 y/o F with known liver disease presents jaundiced. Her GI doctor, Dr. López, sent pt to the ED. Pt relapsed on alcohol and has hyperbilirubinemia. Will obtain labs, abd ultrasound, steroids for alcohol hepatitis and admit to the hospital for further workup and treatment. Pt states last drink of alcohol was 2 weeks ago.

## 2021-08-28 NOTE — H&P ADULT - ASSESSMENT
57 yo f pmhx ETOH abuse (last drink ~2 weeks ago), ETOH cirrhosis with ascites, splenomegaly, HLD, IBS, anxiety admitted with     1. Alcoholic Hepatitis   2. JULIANA  3. Hepatorenal Syndrome  4. MA      NEURO: Anxiety, supportive care, low dose xanax prn. Patient endorses last etoh drink was ~2 weeks ago, will monitor of ETOH withdrawal.   CV: Goal MAP >80, has been shown that higher BPs/MAPS may improve in renal function in HRS.   RESP: No active issues.   RENAL: JULIANA on CKD (baseline ~2.5), avoid nephrotoxic meds, renally dose meds, trend urine output, bun/cr and electrolytes.  Wang ordered. Nephrology following, started on NS for hyponatremia, trending labs q4hr.  Addendum: added sodium bicarb gtt in setting of worsening metabolic acidosis.   GI: Alcoholic Hepatitis with JULIANA, likely hepatorenal syndrome. Started on steroid therapy,  GI following, discussed case with Dr. López who reached out to transplant team at [facility], awaiting phone call form Dr. Gamez. Avoid hepatotoxic meds.  elevated ammonia, started on lactulose.    ENDO: Close glucose monitoring with steroid therapy   ID: No active infectious process.    HEME: Heparin for vte ppx   DISPO: Full code.  Endorses her  is her health care surrogate.      Case discussed with eICU attending Dr. Pride.     Critical Care time: 45 mins assessing presenting problems of acute illness that poses high probability of life threatening deterioration or end organ damage/dysfunction.  Medical decision making including Initiating plan of care, reviewing data, reviewing radiology, discussing with multidisciplinary team, non inclusive of procedures, discussing goals of care with patient/family

## 2021-08-28 NOTE — ED ADULT TRIAGE NOTE - CHIEF COMPLAINT QUOTE
Pt ambulatory to ED sent in by Dr. López for jaundice and diarrhea. Pt with hx of alcohol abuse reports last drink was 2 weeks ago. Pt had labs drawn and was told bilirubin spiked to 28. Pt with tremors in triage.

## 2021-08-28 NOTE — ED PROVIDER NOTE - CRITICAL CARE ATTENDING CONTRIBUTION TO CARE
Lucie CASTELAN M.D. independently provided 35 minutes of critical care including but not limited to talking to consultants, speaking with patient and family and reviewing lab and radiology results.

## 2021-08-28 NOTE — H&P ADULT - HISTORY OF PRESENT ILLNESS
57 yo f pmhx ETOH abuse (last drink ~2 weeks ago), ETOH cirrhosis with ascites, splenomegaly, HLD, IBS, anxiety presented from home after being sent in by her GI Dr. López for abnl labs.  Per patient over the last week she has noticed her sclera/skin to be bright orange, fatigued, lightheaded, nausea, poor appetite and abdominal bloating.  Patient went for labs as an outpatient yesterday, that were significant for elevated BUN/Cr and bili.  In ED patient found to be grossly jaundiced, labs significant for Na 114, BUN/Cr 146/16.3, serum co2 14, tbili 27.1, ast/alt 205/113.  Patient given solumedrol, pantoprazole, PO potassium. Patient seen by nephrology, started on NS for the hyponatremia. Patient admitted to critical care service for higher level of care.

## 2021-08-28 NOTE — PROVIDER CONTACT NOTE (EICU) - ASSESSMENT
Problem List  1. Acute liver failure  2. Acute renal failure, concern of hepatorenal syndrome, unclear fluid status  3. Hyponatremia  4. ETOH cirrrhosis

## 2021-08-28 NOTE — H&P ADULT - NSHPPHYSICALEXAM_GEN_ALL_CORE
GENERAL: Adult female, lying in bed, appears anxious  HEENT: NC/AT, +scleral icterus, pupils 3mm equal round and reactive.   CV: NSR, no MRG appreciated  RESP: symmetrical thorax expansion upon respiration. cta b/l  ABD: soft, mildly distended, nontender, normoactive bowel sounds, no masses appreciated  EXT: no edema, nontender  SKIN: jaundiced, warm  NEURO: Alert, oriented, nonfocal

## 2021-08-28 NOTE — PROVIDER CONTACT NOTE (EICU) - RECOMMENDATIONS
Plan  1. Would transfer to [facility] for liver tx evaluation given MELD 38, JULIANA, with family support (I understand that her last drink was 2.5 weeks ago)  2. May need CVVHD once Na comes up > 120  3. 500cc 5% albumin, normosol (do not use LR)  4. NPO  5. Check Ammonia level  6. Neuro check, she will likely go into hepatic +/- uremia encephalopathy

## 2021-08-29 NOTE — PROGRESS NOTE ADULT - SUBJECTIVE AND OBJECTIVE BOX
NEPHROLOGY CONSULT  HPI:  57 yo f h/o cirrhosis, due to ETOH use was told to come to ED by Dr Williams López due to abnormal labs.  Pt was found to have Na 114, asymptomatic, creat 16 and bilirubin 28  Pt states her last drink was 2 and a half weeks ago, and as per dr López approx 1 month ago creat was wnl and other labs were within baseline.  Pt states she has 5-6 drinks a day past 20 yrs    8/29  awake alert  feels well  metzger in   UO ~ 50 ml since 7 am, 3.5 hrs  Na still @ 116, octreotide, spa started      PAST MEDICAL & SURGICAL HISTORY:  Anxiety    GERD (gastroesophageal reflux disease)    Constipation    Fatty liver    IBS (irritable bowel syndrome)    Liver cirrhosis    HLD (hyperlipidemia)    Claustrophobia    ETOH abuse    Lipoma    H/O colonoscopy    H/O endoscopy    Lipoma of back  2019        FAMILY HISTORY:  Family hx of colon cancer  father    FHx: breast cancer  mother    FH: CAD (coronary artery disease)  father    Family history of stroke  father    Family history of Parkinsonism  father        MEDICATIONS  (STANDING):  chlorhexidine 4% Liquid 1 Application(s) Topical <User Schedule>  sodium chloride 0.9%. 1000 milliLiter(s) (100 mL/Hr) IV Continuous <Continuous>    MEDICATIONS  (PRN):  LORazepam   Injectable 2 milliGRAM(s) IV Push every 1 hour PRN CIWA-Ar score 8 or greater      Allergies    No Known Allergies    Intolerances        I&O's Summary        REVIEW OF SYSTEMS:    CONSTITUTIONAL:  As per HPI.  CONSTITUTIONAL: No weakness, fevers or chills  EYES/ENT: icteric  NECK: No pain or stiffness  CARDIOVASCULAR: No chest pain or palpitations  GASTROINTESTINAL: No abdominal or epigastric pain. No nausea, vomiting, or hematemesis; No diarrhea or constipation. No melena or hematochezia.  GENITOURINARY: No dysuria, frequency or hematuria  NEUROLOGICAL: No numbness or weakness  SKIN: jaundice  All other review of systems is negative unless indicated above      Vital Signs Last 24 Hrs  T(C): 36.6 (28 Aug 2021 19:32), Max: 36.6 (28 Aug 2021 19:32)  T(F): 97.9 (28 Aug 2021 19:32), Max: 97.9 (28 Aug 2021 19:32)  HR: 66 (28 Aug 2021 15:31) (66 - 66)  BP: 117/67 (28 Aug 2021 19:32) (105/60 - 117/67)  BP(mean): 71 (28 Aug 2021 15:31) (71 - 71)  RR: 18 (28 Aug 2021 19:32) (18 - 20)  SpO2: 98% (28 Aug 2021 19:32) (98% - 99%)  Daily Height in cm: 160.02 (28 Aug 2021 15:28)    Daily   I&O's Summary      PHYSICAL EXAM:    General:  Alert, jaundice    Neuro:  Alert and oriented to person, place, and time.       HEENT:  No JVD, no masses, Eyes anicteric, No carotid bruits.No lymphadenopathy,    Cardiovascular:  Regular rate and rhythm, with normal S1 and S2. No murmurs, rubs,  or gallops. No JVD.     Lungs:  clear. no rales, no wheezing, .    Abdomen:  Normoactive bowel sounds. Soft, flat, non-tender, and non-distended.  No hepatosplenomegaly, positive bowel sounds    Skin:  Warm, dry, well-perfused. No rashes or other lesions.     Extremities:  no edema    LABS:                        10.1   11.89 )-----------( 154      ( 28 Aug 2021 16:24 )             27.7     08-28    115<LL>  |  75<L>  |  150<H>  ----------------------------<  126<H>  3.3<L>   |  16<L>  |  16.20<H>    Ca    6.9<L>      28 Aug 2021 17:39  Phos  9.1     08-28  Mg     2.6     08-28    TPro  7.8  /  Alb  2.5<L>  /  TBili  27.1<H>  /  DBili  23.0<H>  /  AST  205<H>  /  ALT  113<H>  /  AlkPhos  306<H>  08-28    PT/INR - ( 28 Aug 2021 17:39 )   PT: 17.4 sec;   INR: 1.52 ratio         PTT - ( 28 Aug 2021 17:39 )  PTT:44.6 sec    Magnesium, Serum: 2.6 mg/dL (08-28 @ 17:39)  Phosphorus Level, Serum: 9.1 mg/dL (08-28 @ 17:39)       NEPHROLOGY CONSULT  HPI:  55 yo f h/o cirrhosis, due to ETOH use was told to come to ED by Dr Williams López due to abnormal labs.  Pt was found to have Na 114, asymptomatic, creat 16 and bilirubin 28  Pt states her last drink was 2 and a half weeks ago, and as per dr López approx 1 month ago creat was wnl and other labs were within baseline.  Pt states she has 5-6 drinks a day past 20 yrs    8/29  awake alert  feels well  metzger in   UO ~ 50 ml since 7 am, 3.5 hrs  Na still @ 116, octreotide, spa started      PAST MEDICAL & SURGICAL HISTORY:  Anxiety    GERD (gastroesophageal reflux disease)    Constipation    Fatty liver    IBS (irritable bowel syndrome)    Liver cirrhosis    HLD (hyperlipidemia)    Claustrophobia    ETOH abuse    Lipoma    H/O colonoscopy    H/O endoscopy    Lipoma of back  2019        FAMILY HISTORY:  Family hx of colon cancer  father    FHx: breast cancer  mother    FH: CAD (coronary artery disease)  father    Family history of stroke  father    Family history of Parkinsonism  father        MEDICATIONS  (STANDING):  chlorhexidine 4% Liquid 1 Application(s) Topical <User Schedule>  sodium chloride 0.9%. 1000 milliLiter(s) (100 mL/Hr) IV Continuous <Continuous>    MEDICATIONS  (PRN):  LORazepam   Injectable 2 milliGRAM(s) IV Push every 1 hour PRN CIWA-Ar score 8 or greater      Allergies    No Known Allergies    Intolerances        I&O's Summary        REVIEW OF SYSTEMS:    CONSTITUTIONAL:  As per HPI.  CONSTITUTIONAL: No weakness, fevers or chills  EYES/ENT: icteric  NECK: No pain or stiffness  CARDIOVASCULAR: No chest pain or palpitations  GASTROINTESTINAL: No abdominal or epigastric pain. No nausea, vomiting, or hematemesis; No diarrhea or constipation. No melena or hematochezia.  GENITOURINARY: No dysuria, frequency or hematuria  NEUROLOGICAL: No numbness or weakness  SKIN: jaundice  All other review of systems is negative unless indicated above      Vital Signs Last 24 Hrs  T(C): 36.6 (28 Aug 2021 19:32), Max: 36.6 (28 Aug 2021 19:32)  T(F): 97.9 (28 Aug 2021 19:32), Max: 97.9 (28 Aug 2021 19:32)  HR: 66 (28 Aug 2021 15:31) (66 - 66)  BP: 117/67 (28 Aug 2021 19:32) (105/60 - 117/67)  BP(mean): 71 (28 Aug 2021 15:31) (71 - 71)  RR: 18 (28 Aug 2021 19:32) (18 - 20)  SpO2: 98% (28 Aug 2021 19:32) (98% - 99%)  Daily Height in cm: 160.02 (28 Aug 2021 15:28)    Daily   I&O's Summary      PHYSICAL EXAM:    General:  Alert, jaundice    Neuro:  Alert and oriented to person, place, and time.       HEENT:  No JVD, no masses, Eyes anicteric, No carotid bruits.No lymphadenopathy,    Cardiovascular:  Regular rate and rhythm, with normal S1 and S2. No murmurs, rubs,  or gallops. No JVD.     Lungs:  clear. no rales, no wheezing, .    Abdomen:  Normoactive bowel sounds. Soft, flat, non-tender, and non-distended.  No hepatosplenomegaly, positive bowel sounds    Skin:  Warm, dry, well-perfused. No rashes or other lesions.     Extremities:  no edema    LABS:                                     9.3    6.91  )-----------( 138      ( 29 Aug 2021 06:37 )             26.0                         10.1   11.89 )-----------( 154      ( 28 Aug 2021 16:24 )             27.7         116    |  77     |  152    ----------------------------<  148       29 Aug 2021 06:37  3.6     |  12     |  16.20    116    |  78     |  161    ----------------------------<  144       29 Aug 2021 02:18  3.5     |  12     |  16.60    116    |  76     |  157    ----------------------------<  124       28 Aug 2021 23:33  3.4     |  13     |  16.50    Ca    6.5        29 Aug 2021 06:37  Ca    6.5        29 Aug 2021 02:18  Ca    6.6        28 Aug 2021 23:33    Phos  10.2      29 Aug 2021 06:37  Phos  9.1       28 Aug 2021 17:39    Mg     2.7       29 Aug 2021 06:37  Mg     2.6       28 Aug 2021 17:39

## 2021-08-29 NOTE — CONSULT NOTE ADULT - SUBJECTIVE AND OBJECTIVE BOX
GI consult  Pt of Dr. López    HPI:  55 yo f pmhx ETOH abuse (last drink ~2 weeks ago), ETOH cirrhosis with ascites, splenomegaly, HLD, IBS, anxiety presented from home after being sent in by her GI Dr. López for abnl labs.  Per patient over the last week she has noticed her sclera/skin to be bright orange, fatigued, lightheaded, nausea, poor appetite and abdominal bloating.  Patient went for labs as an outpatient yesterday, that were significant for elevated BUN/Cr and bili.  In ED patient found to be grossly jaundiced, labs significant for Na 114, BUN/Cr 146/16.3, serum co2 14, tbili 27.1, ast/alt 205/113.  Patient given solumedrol, pantoprazole, PO potassium. Patient seen by nephrology, started on NS for the hyponatremia. Patient admitted to critical care service for higher level of care.  (28 Aug 2021 20:02)    Called to evaluate this patient for cirrhosis, liver failure, hepatorenal syndrome.  She has a long history of alcohol abuse and for the last 2-1/2 years has been drinking more than 5 drinks per day.  The patient was found to have markedly abnormal lab values and was sent to the hospital.  She is deeply jaundiced and denies abdominal pain, nausea, vomiting.  She denies melena or hematochezia.  She was found to have elevated liver functions and creatinine 16 and multiple electrolyte abnormalities including severe hyponatremia.  She is pending transfer to a tertiary care center for CVVH and liver transplant evaluation.     PAST MEDICAL & SURGICAL HISTORY:  Anxiety    GERD (gastroesophageal reflux disease)    Constipation    Fatty liver    IBS (irritable bowel syndrome)    Liver cirrhosis    HLD (hyperlipidemia)    Claustrophobia    ETOH abuse    Lipoma    H/O colonoscopy    H/O endoscopy    Lipoma of back  2019        Home Medications:  atorvastatin 20 mg oral tablet: 1 tab(s) orally once a day-HS (06 Nov 2020 09:54)  Lasix 40 mg oral tablet: 1 tab(s) orally once a day (06 Nov 2020 09:54)  Protonix 40 mg oral granule, delayed release: 1 each orally once a day (06 Nov 2020 09:54)  spironolactone 100 mg oral tablet: 1 tab(s) orally once a day (06 Nov 2020 09:54)      MEDICATIONS  (STANDING):  albumin human 25% IVPB 25 Gram(s) IV Intermittent every 8 hours  chlorhexidine 4% Liquid 1 Application(s) Topical <User Schedule>  heparin   Injectable 5000 Unit(s) SubCutaneous every 12 hours  lactulose Syrup 20 Gram(s) Oral three times a day  methylPREDNISolone sodium succinate Injectable 32 milliGRAM(s) IV Push daily  octreotide  Infusion 50 MICROgram(s)/Hr (10 mL/Hr) IV Continuous <Continuous>  sodium bicarbonate  Infusion 0.189 mEq/kG/Hr (100 mL/Hr) IV Continuous <Continuous>    MEDICATIONS  (PRN):  LORazepam   Injectable 2 milliGRAM(s) IV Push every 1 hour PRN CIWA-Ar score 8 or greater      Allergies    No Known Allergies    Intolerances        SOCIAL HISTORY: EtOH as above    FAMILY HISTORY:  Family hx of colon cancer  father    FHx: breast cancer  mother    FH: CAD (coronary artery disease)  father    Family history of stroke  father    Family history of Parkinsonism  father        ROS  As above  Otherwise unremarkable, all systems reviewed    PE:  Vital Signs Last 24 Hrs  T(C): 36.4 (29 Aug 2021 13:00), Max: 37 (29 Aug 2021 05:30)  T(F): 97.6 (29 Aug 2021 13:00), Max: 98.6 (29 Aug 2021 05:30)  HR: 72 (29 Aug 2021 16:00) (69 - 79)  BP: 142/84 (29 Aug 2021 16:00) (96/36 - 142/84)  BP(mean): 97 (29 Aug 2021 16:00) (49 - 97)  RR: 15 (29 Aug 2021 16:00) (10 - 21)  SpO2: 98% (29 Aug 2021 16:00) (97% - 100%)    Constitutional: NAD, well-developed, A+Ox3  ++icteric   Respiratory: CTABL, breathing comfortably  Cardiovascular: S1 and S2, RRR  Gastrointestinal: +BS, soft, non tender, mildly distended, no mass  Extremities: warm, well perfused, no edema  Psychiatric: Normal mood, normal affect  Neuro: moves all extremities, grossly intact  no asterixis  Skin: No rashes or lesions, deep jaundice, +palmar erythema    LABS:                        9.3    6.91  )-----------( 138      ( 29 Aug 2021 06:37 )             26.0     08-29    118<LL>  |  79<L>  |  156<H>  ----------------------------<  137<H>  3.5   |  16<L>  |  15.70<H>    Ca    6.4<LL>      29 Aug 2021 11:59  Phos  10.2     08-29  Mg     2.7     08-29    TPro  7.3  /  Alb  2.4<L>  /  TBili  25.8<H>  /  DBili  23.8<H>  /  AST  183<H>  /  ALT  110<H>  /  AlkPhos  290<H>  08-29    PT/INR - ( 28 Aug 2021 17:39 )   PT: 17.4 sec;   INR: 1.52 ratio         PTT - ( 28 Aug 2021 17:39 )  PTT:44.6 sec  LIVER FUNCTIONS - ( 29 Aug 2021 06:37 )  Alb: 2.4 g/dL / Pro: 7.3 gm/dL / ALK PHOS: 290 U/L / ALT: 110 U/L / AST: 183 U/L / GGT: x             RADIOLOGY & ADDITIONAL STUDIES:    EXAM:  US DPLX ABDOMEN                          EXAM:  US ABDOMEN COMPLETE                            PROCEDURE DATE:  08/28/2021          INTERPRETATION:  CLINICAL INFORMATION: Jaundice.    COMPARISON: None available.    TECHNIQUE: Sonography of the abdomen. Color and spectral Doppler evaluation of the hepatic vasculature was performed.    FINDINGS:    Liver: Nodular contour and coarsened echotexture.    Bile ducts: Normal caliber. Common bile duct measures 6 mm.    Gallbladder: Mildly distended with gallbladder wall measuring 0.4 cm, at upper limits of normal.    Pancreas: Poorly visualized.    Spleen: 14.8 cm. Splenomegaly    Right kidney: 11.4 cm. No hydronephrosis.    Left kidney: 10.5 cm.  No hydronephrosis.    Ascites: None.    Aorta and IVC: Visualized portions are within normal limits.    Main portal vein: No flow is visualized, probably occluded.    Right portal vein: Patent with hepatopedal flow.    Left portal vein: No flow is visualized, probably occluded.    Hepatic artery: Patent with a normal waveform. Peak systolic velocity is 149 cm/s.    Hepatic veins: Patent with normal flow hemodynamics and spectral waveforms.    Splenic vein: Patent  with hepatopedal flow.    IMPRESSION:  The main and left portal vein do not demonstrate flow, probably due to occlusion.    Right portal vein is patent with normal directional flow.  Patent hepatic veins and hepatic artery.    Cirrhosis and splenomegaly.    --- End of Report ---            SHIV SHAW MD; Attending Radiologist  This document has been electronically signed. Aug 28 2021  7:04PM

## 2021-08-29 NOTE — CHART NOTE - NSCHARTNOTEFT_GEN_A_CORE
Confidential Drug Utilization Report  Search Terms: Lucila Maya, 1965Search Date: 08/29/2021 21:03:25 PM  Searching on behalf of: [facility]  The Drug Utilization Report below displays all of the controlled substance prescriptions, if any, that your patient has filled in the last twelve months. The information displayed on this report is compiled from pharmacy submissions to the Department, and accurately reflects the information as submitted by the pharmacies.    This report was requested by: Erica Stanley | Reference #: [id number]    Others' Prescriptions  Patient Name: Lucila MayaBirth Date: 1965  Address: [address] [place] [address] [address] [place], [place] [address]: Female  Rx Written	Rx Dispensed	Drug	Quantity	Days Supply	Prescriber Name	Prescriber Deisi #	Payment Method	Dispenser  05/21/2021	05/23/2021	alprazolam 0.5 mg tablet	60	30	Yamilet Sutton M	[id number]	Insurance	[facility] #[id number]  04/12/2021	04/12/2021	alprazolam 0.5 mg tablet	60	30	Yamilet Sutton M	[id number]	Insurance	[facility] #[id number]  03/05/2021	03/13/2021	alprazolam 0.5 mg tablet	60	30	Yamilet Sutton M	[id number]	Insurance	[facility] #[id number]  * - Drugs marked with an asterisk are compound drugs. If the compound drug is made up of more than one controlled substance, then each controlled substance will be a separate row in the table.

## 2021-08-29 NOTE — H&P ADULT - NSHPLABSRESULTS_GEN_ALL_CORE
LABS:                        9.3    6.91  )-----------( 138      ( 29 Aug 2021 06:37 )             26.0       08-29    118<LL>  |  79<L>  |  156<H>  ----------------------------<  137<H>  3.5   |  16<L>  |  15.70<H>    Ca    6.4<LL>      29 Aug 2021 11:59  Phos  10.2     08-29  Mg     2.7     08-29    TPro  7.3  /  Alb  2.4<L>  /  TBili  25.8<H>  /  DBili  23.8<H>  /  AST  183<H>  /  ALT  110<H>  /  AlkPhos  290<H>  08-29          PT/INR - ( 28 Aug 2021 17:39 )   PT: 17.4 sec;   INR: 1.52 ratio      PTT - ( 28 Aug 2021 17:39 )  PTT:44.6 sec    Lactate Trend    CAPILLARY BLOOD GLUCOSE    u< from: US Abdomen Doppler (08.28.21 @ 18:04) >    IMPRESSION:  The main and left portal vein do not demonstrate flow, probably due to occlusion.    Right portal vein is patent with normal directional flow.  Patent hepatic veins and hepatic artery.    Cirrhosis and splenomegaly.    < end of copied text >

## 2021-08-29 NOTE — PROGRESS NOTE ADULT - ASSESSMENT
A/P: 56 female presenting with HRS and Acute alcoholic Hepatitis    Plan;  ICU    PULM: Continue Room Air    Cardio: On Levophed to increase MAP by 10 for HRS    Renal: Continue Levo, Octreotide and albumin for HRS, Continue Bicarb Drip and BMP at noon.  Arranging transfer to [place] for CVVH    GI: Continue Steroids for Acute alcoholic hepatitis PPI, NPO for Now    D/W Renal GI, and awaiting a call back from the ICU at [facility] DVT Prophylaxis

## 2021-08-29 NOTE — CONSULT NOTE ADULT - ASSESSMENT
55 yo f h/o cirrhosis, due to ETOH use was told to come to ED by Dr Williams López due to abnormal labs.  Pt was found to have Na 114, asymptomatic, creat 16 and bilirubin 28  Pt states her last drink was 2 and a half weeks ago, and as per dr López approx 1 month ago creat was wnl and other labs were within baseline.  Pt states she has 5-6 drinks a day past 20 yrs    A/P  HRS-JULIANA  hyponatremia will give low rate NS  sodium correction up to 120 in the 1st 24 hr  Hold off on spa due to juliana, creat 16, until urine output established and documented to avoid pulm edema with albumin  labs 11 pm and 2 am  GI evaluation  Evaluate transfer for liver transplant  hold off on dialysis, CVVH not available  overcorrection of sodium is a risk w intermittent HD  CVVH would be the RRT of choice
56-year-old woman with alcoholic liver disease, cirrhosis, alcoholic hepatitis, and renal failure with creatinine 16 on presentation and profound hyponatremia.  Despite this her INR is only mildly prolonged and she has only minimal hepatic encephalopathy.  The most pressing issue is the need for renal replacement therapy and correction of severe electrolyte abnormalities.    Recommendations:  -Agree with transfer to tertiary center  -Agree with steroids for alcoholic hepatitis  -cautious with albumin for HRS due to volume overload issues and renal failure, also likely too late for this strategy to help. Same with octreotide   -Diet as tolerated within renal recommendations  -trend LFTs  -Hold nonessential medications, statin, etc.  -Serologic workup for elevated LFTs was likely done as outpatient but can consider repeating a tertiary center, would not currently   -Trend INR  -Continue lactulose for now  -Monitor for bleeding and other complications  -Prognosis seems very poor  -Discussed with patient in detail  -Discussed with Kayla Burton

## 2021-08-29 NOTE — CHART NOTE - NSCHARTNOTEFT_GEN_A_CORE
: Amelie BELTRAN NP     INDICATION: JULIANA / Volume status     PROCEDURE:  [x ] LIMITED ECHO  [x ] LIMITED CHEST  [ ] LIMITED RETROPERITONEAL  [x ] LIMITED ABDOMINAL  [ ] LIMITED DVT  [ ] NEEDLE GUIDANCE VASCULAR  [ ] NEEDLE GUIDANCE THORACENTESIS  [ ] NEEDLE GUIDANCE PARACENTESIS  [ ] NEEDLE GUIDANCE PERICARDIOCENTESIS  [ ] OTHER    FINDINGS:  ECHO   POOR visualization   - RV smaller than LV   - RV and LV with normal Systolic function   - No pericardial effusion noted   - IVC 2.4 cm     CHEST   - Bilateral anterior chest wall with a Line predominance   - Bilateral pleural Bases without any consolidation or effusion     ABd  - Small ascites   - bladder is collapsed around Wang       INTERPRETATION:  Normal RV and LV Systolic Function  Clear lungs with out any consolidation or effusion   IVC enlarged may represent occlusion from portal vein   Small ascites

## 2021-08-29 NOTE — H&P ADULT - NSHPREVIEWOFSYSTEMS_GEN_ALL_CORE
REVIEW OF SYSTEMS: negative except as above  CONSTITUTIONAL: No weakness, fevers or chills  EYES/ENT: No visual changes;  No vertigo or throat pain   NECK: No pain or stiffness  RESPIRATORY: No cough, wheezing, hemoptysis; No shortness of breath  CARDIOVASCULAR: No chest pain or palpitations  GASTROINTESTINAL: No abdominal or epigastric pain. No nausea, vomiting, or hematemesis; No diarrhea or constipation. No melena or hematochezia.  GENITOURINARY: No dysuria, frequency or hematuria  NEUROLOGICAL: No numbness or weakness  SKIN: No itching, rashes REVIEW OF SYSTEMS: negative except as above  CONSTITUTIONAL: + weakness, no fevers or chills  EYES/ENT: No visual changes; No vertigo or throat pain   NECK: No pain or stiffness  RESPIRATORY: No cough, wheezing, hemoptysis; No shortness of breath  CARDIOVASCULAR: No chest pain or palpitations  GASTROINTESTINAL: No abdominal or epigastric pain. + nausea, no vomiting, or hematemesis; No diarrhea or constipation  GENITOURINARY: No dysuria, no changes in urine color, decreased in urine frequency   NEUROLOGICAL: No numbness or weakness; +Anxiety   SKIN: No itching, rashes, +red spots on chest

## 2021-08-29 NOTE — H&P ADULT - ATTENDING COMMENTS
care provided 8/29/21    Patient seen and examined.  Agree with resident note as above.  Patient with hx as noted including alcoholic cirrhosis who presented to [facility] with worsening jaundice/nasusea/decreased po and found to have decompensated cirrhosis with coagulopathy, JULIANA thought due to hepatorenal, hyponatremia.  Also found to have possible thrombus in L portal and main portal veins.  Transferred to [facility] MICU for liver transplant eval and CRRT.  On arrival to MICU, liver and renal consults placed, Shiley inserted, and CRRT started.  Levophed gtt started for BP support.  POCUS shows good biventricular function and full IVC (as separately noted).  Will continue liver transplant eval, await recs.  Appreciate renal recs, continue CRRT.  Full plan as above and I have edited as appropriate.    Patient identifies  as surrogate decision maker, requests FULL CODE.  MEchanical DVT ppx surrounding the time of shiley, consider full AC this AM for portal venous thrombosis.

## 2021-08-29 NOTE — PROGRESS NOTE ADULT - ASSESSMENT
57 yo f h/o cirrhosis, due to ETOH use was told to come to ED by Dr Williams López due to abnormal labs.  Pt was found to have Na 114, asymptomatic, creat 16 and bilirubin 28  Pt states her last drink was 2 and a half weeks ago, and as per dr López approx 1 month ago creat was wnl and other labs were within baseline.  Pt states she has 5-6 drinks a day past 20 yrs    A/P  HRS-JULIANA  hyponatremia will give low rate NS  sodium correction up to 120 in the 1st 24 hr  Hold off on spa due to juliana, creat 16, until urine output established and documented to avoid pulm edema with albumin  labs 11 pm and 2 am  GI evaluation  Evaluate transfer for liver transplant  hold off on dialysis, CVVH not available  overcorrection of sodium is a risk w intermittent HD  CVVH would be the RRT of choice    8/29  awake alert  feels well  metzger in, UO ~ 50 ml since 7 am, 3.5 hrs  Na still @ 116, octreotide, spa started  discussed w Dr Monk, intensivist  I spoke spoke to Dr Carreno earlier , Nephrologist at [place]  Will initiate ICU to ICU transfer, and nephrology at [facility] will be consulted for CRRT  Transfer [place] [phone] 55 yo f h/o cirrhosis, due to ETOH use was told to come to ED by Dr Williams López due to abnormal labs.  Pt was found to have Na 114, asymptomatic, creat 16 and bilirubin 28  Pt states her last drink was 2 and a half weeks ago, and as per dr López approx 1 month ago creat was wnl and other labs were within baseline.  Pt states she has 5-6 drinks a day past 20 yrs    A/P  HRS-JULIANA  hyponatremia will give low rate NS  sodium correction up to 120 in the 1st 24 hr  Hold off on spa due to juliana, creat 16, until urine output established and documented to avoid pulm edema with albumin  labs 11 pm and 2 am  GI evaluation  Evaluate transfer for liver transplant  hold off on dialysis, CVVH not available  overcorrection of sodium is a risk w intermittent HD  CVVH would be the RRT of choice    8/29  HRS-JULIANA,  ATN  Hyponatremia, Na 116  awake alert  feels well  metzger in, UO ~ 50 ml since 7 am, 3.5 hrs  octreotide, spa started  discussed w Dr Monk, intensivist  I spoke spoke to Dr Carreno earlier , Nephrologist at [place]  Will initiate ICU to ICU transfer, and nephrology at [facility] will be consulted for CRRT  Transfer [place] [phone]

## 2021-08-29 NOTE — H&P ADULT - NSHPPHYSICALEXAM_GEN_ALL_CORE
T(C): 37.1 (08-29-21 @ 18:25), Max: 37.1 (08-29-21 @ 18:25)  T(F): 98.7 (08-29-21 @ 18:25), Max: 98.7 (08-29-21 @ 18:25)  HR: 70 (08-29-21 @ 18:25) (69 - 79)  BP: 104/55 (08-29-21 @ 18:25) (96/36 - 142/84)  RR: 19 (08-29-21 @ 18:25) (10 - 21)  SpO2: 99% (08-29-21 @ 18:25) (97% - 100%)  Wt(kg): --    GENERAL: NAD, lying in bed comfortably  HEAD:  Atraumatic, normocephalic  EYES: EOMI, PERRLA, conjunctiva and sclera clear  ENT: Moist mucous membranes  NECK: Supple, no JVD  HEART: Regular rate and rhythm, no murmurs, rubs, or gallops  LUNGS: Unlabored respirations.  Clear to auscultation bilaterally, no crackles, wheezing, or rhonchi  ABDOMEN: Soft, nontender, nondistended, +BS  EXTREMITIES: 2+ peripheral pulses bilaterally. No clubbing, cyanosis, or edema  NERVOUS SYSTEM:  A&Ox3, no focal deficits   SKIN: No rashes or lesions T(C): 37.1 (08-29-21 @ 18:25), Max: 37.1 (08-29-21 @ 18:25)  T(F): 98.7 (08-29-21 @ 18:25), Max: 98.7 (08-29-21 @ 18:25)  HR: 70 (08-29-21 @ 18:25) (69 - 79)  BP: 104/55 (08-29-21 @ 18:25) (96/36 - 142/84)  RR: 19 (08-29-21 @ 18:25) (10 - 21)  SpO2: 99% (08-29-21 @ 18:25) (97% - 100%)  Wt(kg): --    GENERAL: NAD, lying in bed comfortably  HEAD:  Atraumatic, normocephalic  EYES: EOMI, PERRLA, conjunctiva and sclera icteric   ENT: Moist mucous membranes  NECK: Supple, no JVD  HEART: Regular rate and rhythm, no murmurs, rubs, or gallops  LUNGS: Unlabored respirations.  Clear to auscultation bilaterally, no crackles, wheezing, or rhonchi  ABDOMEN: Soft, nontender, nondistended, +BS, large, tympanic to percussion   EXTREMITIES: 2+ peripheral pulses bilaterally. RUE with large ecchymoses   NERVOUS SYSTEM:  A&Ox3, no focal deficits   SKIN: No rashes or lesions. Anterior chest with few blanching erythematous lesions consistent with spider angiomata T(C): 37.1 (08-29-21 @ 18:25), Max: 37.1 (08-29-21 @ 18:25)  T(F): 98.7 (08-29-21 @ 18:25), Max: 98.7 (08-29-21 @ 18:25)  HR: 70 (08-29-21 @ 18:25) (69 - 79)  BP: 104/55 (08-29-21 @ 18:25) (96/36 - 142/84)  RR: 19 (08-29-21 @ 18:25) (10 - 21)  SpO2: 99% (08-29-21 @ 18:25) (97% - 100%)  Wt(kg): --    GENERAL: NAD, lying in bed comfortably  HEAD:  Atraumatic, normocephalic  EYES: EOMI, PERRLA, conjunctiva and sclera icteric   ENT: Moist mucous membranes  NECK: Supple, no JVD  HEART: Regular rate and rhythm, no murmurs, rubs, or gallops  LUNGS: Unlabored respirations.  Clear to auscultation bilaterally, no crackles, wheezing, or rhonchi  ABDOMEN: Soft, nontender, nondistended, +BS, large, tympanic to percussion   EXTREMITIES: 2+ peripheral pulses bilaterally. RUE with large ecchymoses   NERVOUS SYSTEM:  A&Ox3, no focal deficits, No asterixis   SKIN: No rashes or lesions. Anterior chest with few blanching erythematous lesions consistent with spider angiomata

## 2021-08-29 NOTE — CONSULT NOTE ADULT - ASSESSMENT
56 y.o. Female with PMhx ETOH use disorder (last drink ~2 weeks ago), ETOH cirrhosis with ascites, splenomegaly, HLD, IBS, anxiety presented from [facility] for severe hyponatremia and JULIANA requiring CRRT.     Nephrology consulted for JULIANA and hyponatremia.   ---------------------------------------------------------------      #JULIANA  Pt with JULIANA in the setting of severe liver disease. Exact duration of JULIANA however unknown. [facility] and Cr 0.89 mg/dl. Upon admission to OSH Cr was 16 (8/28/21) with . Today most recent labs BUN/Cr 156/15.7. Tbil: 25. She is s/p Octreotide/ albumin. JULIANA most likely CRS vs Bilirubin cast nephropathy.     Please send UA, urine electrolytes, spot urine TP/CR.   At this time due to severe oliguric JULIANA and electrolyte derangement will start CVVHDF   CVVHDF with low prescription (20ml/kg/hr) for slow clearance and prevent overcorrection of sodium   Please discontinue Bicarb gtt   monitor Na q4hrs while on CRRT  Monitor labs and urine output.   Avoid NSAIDs, ACEI/ARBS, RCA and nephrotoxins.   Dose medications as per CRRT.     #hyponatremia   Pt. with severe hyponatremia in setting of severe liver disease  Serum sodium was 114 on admission to OSH (8/28),   most recent is 118 (8/29), stable correction in 24hrs.   Pt. with most likely acute hyponatremia.   please check Urine Osm, urine elctrolytes  check serum osm.   Patient to be started on CVVHDF for slow correction of Na and severe JULIANA.   Monitor serum sodium Q4 hours.  Do not correct serum sodium >6-8 meq/day.  If rapid correction is observed please call Nephrology on call for further recs.       #hypocalcemia   iCa of 0.6  please discontinue Bicarb gtt.  correct iCa to goal 1.1   patient to be started on CRRT.

## 2021-08-29 NOTE — H&P ADULT - ASSESSMENT
56 y.o. F with PMH of ETOH misuse, ETOH cirrhosis, HLD, IBS, anxiety, presents with decompensated alcoholic cirrhosis c/b HRS, likely contributing to acute renal failure and hypervolemic hyponatremia, now requiring CRRT, transferred to [facility] MICU for further management.     Neuro  # ETOH Misuse  - Continue to monitor for w/d symptoms; pt reported last drink was 2.5 wks ago  - SW consult   - C/w symptom triggered CIWA  # Anxiety  - C/w Xanax PRN     Cardiovascular  # Normotensive  - Monitor BP while on CRRT, may need pressors   - Maintain MAP goal >80 to facilitate renal blood flow in HRS    Pulmonary  - Normal O2 Saturation on room air, monitor for volume overload in s/o ARF and hypervolemia     GI  # Hepatorenal syndrome   - See Renal     # ETOH Cirrhosis   - C/w solumedrol 40mg IV qD   - Maddrey score: 52.4 indicative of poor prognosis  - Hepatology consult in am for transplant eval   - C/w lactulose 20mg TID and titrate to 3BM daily   - Pt anuric now, unclear if spironolactone will be helpful    # Portal vein occlusion  - U/S Abd doppler from [facility] revealed main and left portal vein occlusions  - Cirrhosis induced coagulopathy, hold AC?     Renal  # Hepatorenal syndrome  - Suspect acute renal failure 2/2 HRS in s/o alcoholic hepatitis and cirrhosis   - S/p    56 y.o. F with PMH of ETOH misuse, ETOH cirrhosis, HLD, IBS, anxiety, presents with decompensated alcoholic cirrhosis c/b HRS, likely contributing to acute renal failure and hypervolemic hyponatremia, now requiring CRRT, transferred to [facility] MICU for further management.     Neuro  # ETOH Misuse  - Continue to monitor for w/d symptoms; pt reported last drink was 2.5 wks ago  - SW consult   - C/w symptom triggered CIWA  # Anxiety  - C/w Xanax PRN   - Start thiamine and folate     Cardiovascular  # Normotensive  - Monitor BP while on CRRT, may need pressors   - Maintain MAP goal >80 to facilitate renal blood flow in HRS    Pulmonary  - Normal O2 Saturation on room air, monitor for volume overload in s/o ARF and hypervolemia     GI  # Hepatorenal syndrome   - See Renal     # ETOH Cirrhosis   - C/w solumedrol 40mg IV qD   - Maddrey score: 52.4 indicative of poor prognosis  - MELD Score:   - Hepatology consult in am for transplant eval   - C/w lactulose 20mg TID and titrate to 3BM daily   - Pt anuric now, unclear if spironolactone will be helpful  - No evidence of ascites on U/S abd     Renal  # Hepatorenal syndrome  - Suspect acute renal failure 2/2 HRS in s/o alcoholic hepatitis and cirrhosis   - C/w octreotide, albumin gtt  - Start CRRT as per nephrology  - Baseline Cr 2.5  - Monitor Cr and avoid nephrotoxic agents  - Renally dose meds     # Hyponatremia  - Hypervolemic state in s/o ARF  - Na 114-->118 in 24 hours   - S/p NS boluses while at [place]  - Trend BMP Mg Phos q6   - Expect Na to improve as volume status improves with CRRT   - Avoid rapid correction, goal 6-8 mEq in 24 hrs     # Metabolic acidosis   - Likely 2/2 renal loss of bicarb and inability to excrete H+ in s/o ARF   - C/w NaHCO3 drip   - Expect improvement with CRRT   - Monitor BMP     ID  # SBP PPx   - Ascites?   - Empirically tx with CTX   - Afebrile without leukocytosis so far     Heme  # Macrocytic anemia  - Likely in s/o poor nutritional status from ETOH use  - Check B12 folate iron studies  - Folate thiamine supplement    # Coagulopathy   - In s/o decompensated alcoholic cirrhosis and ARF   - Give DDAVP for likely plt dysfunction     # Portal vein occlusion  - U/S Abd doppler from [facility] revealed main and left portal vein occlusions  - Cirrhosis induced coagulopathy, hold AC?     Ethics  # FULL code  56 y.o. F with PMH of ETOH misuse, ETOH cirrhosis, HLD, IBS, anxiety, presents with decompensated alcoholic cirrhosis c/b HRS, likely contributing to acute renal failure and hypervolemic hyponatremia, now requiring CRRT, transferred to [facility] MICU for further management.     Neuro  # ETOH Misuse  - Continue to monitor for w/d symptoms; pt reported last drink was 2.5 wks ago  - SW consult   - C/w symptom triggered CIWA  # Anxiety  - C/w Xanax PRN   - Start thiamine and folate     Cardiovascular  # Normotensive at this time with MAP 70s   - Monitor BP while on CRRT, may need pressors   - No clinical evidence suggesting MAP goal 85 or above is superior to MAP goal of 65-70     Pulmonary  - Normal O2 Saturation on room air, monitor for volume overload in s/o ARF and hypervolemia     GI  # Hepatorenal syndrome   - See Renal     # ETOH Cirrhosis   - C/w solumedrol 40mg IV qD   - Maddrey score: 52.4 indicative of poor prognosis  - MELD Score: 38 --?65-66% risk of 90 day mortality   - Hepatology consult in am for transplant eval   - C/w lactulose 20mg TID and titrate to 3BM daily   - Pt anuric now, unclear if spironolactone will be helpful  - No evidence of ascites on U/S abd     Renal  # Hepatorenal syndrome  - Suspect acute renal failure 2/2 HRS in s/o alcoholic hepatitis and cirrhosis   - C/w octreotide, albumin gtt  - Start CRRT as per nephrology  - Baseline Cr 2.5  - Monitor Cr and avoid nephrotoxic agents  - Renally dose meds   - MAP goal = ?    # Hyponatremia  - Hypervolemic state in s/o ARF  - Na 114-->118 in 24 hours   - S/p NS boluses while at [place]  - Trend BMP Mg Phos q6   - Expect Na to improve as volume status improves with CRRT   - Avoid rapid correction, goal 6-8 mEq in 24 hrs     # Metabolic acidosis   - Likely 2/2 renal loss of bicarb and inability to excrete H+ in s/o ARF   - C/w NaHCO3 drip   - Expect improvement with CRRT   - Monitor BMP     ID  # SBP PPx   - Ascites?   - Empirically tx with CTX   - Afebrile without leukocytosis so far     Heme  # Macrocytic anemia  - Likely in s/o poor nutritional status from ETOH use  - Check B12 folate iron studies  - Folate thiamine supplement    # Coagulopathy   - In s/o decompensated alcoholic cirrhosis and ARF   - Give DDAVP for likely plt dysfunction     # Portal vein occlusion  - U/S Abd doppler from [facility] revealed main and left portal vein occlusions  - Cirrhosis induced coagulopathy, hold AC?     Ethics  # FULL code  56 y.o. F with PMH of ETOH misuse, ETOH cirrhosis, HLD, IBS, anxiety, presents with decompensated alcoholic cirrhosis c/b HRS, likely contributing to acute renal failure and hypervolemic hyponatremia, now requiring CRRT, transferred to [facility] MICU for further management.     Neuro  # ETOH Misuse  - Continue to monitor for w/d symptoms; pt reported last drink was 2.5 wks ago  - SW consult   - C/w symptom triggered CIWA  # Anxiety  - C/w Xanax PRN   - Start thiamine and folate     Cardiovascular  # Normotensive at this time with MAP 70s   - Monitor BP while on CRRT, may need pressors   - No clinical evidence suggesting MAP goal 85 or above is superior to MAP goal of 65-70     Pulmonary  - Normal O2 Saturation on room air, monitor for volume overload in s/o ARF and hypervolemia     GI  # Hepatorenal syndrome   - See Renal     # ETOH Cirrhosis   - C/w solumedrol 40mg IV qD   - Maddrey score: 52.4 indicative of poor prognosis  - MELD Score: 38 --?65-66% risk of 90 day mortality   - Hepatology consult in am for transplant eval   - C/w lactulose 20mg TID and titrate to 3BM daily   - Pt anuric now, unclear if spironolactone will be helpful  - No evidence of ascites on U/S abd     Renal  # Hepatorenal syndrome  - Suspect acute renal failure 2/2 HRS in s/o alcoholic hepatitis and cirrhosis   - C/w octreotide, albumin gtt  - Start CRRT as per nephrology  - Baseline Cr 2.5  - Monitor Cr and avoid nephrotoxic agents  - Renally dose meds   - MAP goal = ?    # Hyponatremia  - Hypervolemic state in s/o ARF  - Na 114-->118 in 24 hours   - S/p NS boluses while at [place]  - Trend BMP Mg Phos q4   - Expect Na to improve as volume status improves with CRRT   - Avoid rapid correction, goal 6-8 mEq in 24 hrs     # Metabolic acidosis   - Likely 2/2 renal loss of bicarb and inability to excrete H+ in s/o ARF   - C/w NaHCO3 drip   - Expect improvement with CRRT   - Monitor BMP     ID  # SBP PPx   - Ascites?   - Empirically tx with CTX   - Afebrile without leukocytosis so far     Heme  # Macrocytic anemia  - Likely in s/o poor nutritional status from ETOH use  - Check B12 folate iron studies  - Folate thiamine supplement    # Coagulopathy   - In s/o decompensated alcoholic cirrhosis and ARF   - Give DDAVP for likely plt dysfunction     # Portal vein occlusion  - U/S Abd doppler from [facility] revealed main and left portal vein occlusions  - Cirrhosis induced coagulopathy, start AC in the am after catheter access is established     Ethics  # FULL code  56 y.o. F with PMH of ETOH misuse, ETOH cirrhosis, HLD, IBS, anxiety, presents with decompensated alcoholic cirrhosis c/b HRS, likely contributing to acute renal failure and hyponatremia, now requiring CRRT, transferred to [facility] MICU for further management.     Neuro  # ETOH Misuse  - Continue to monitor for w/d symptoms; pt reported last drink was 2.5 wks ago  - SW consult & cessation counseling   - Start thiamine and folate     # Anxiety  - C/w Xanax PRN   - See ISTOP     Cardiovascular  # Normotensive at this time with MAP 70s   - Monitor BP while on CRRT, may need pressors   - No clinical evidence suggesting MAP goal 85 or above is superior to MAP goal of 65-70     Pulmonary  - Normal O2 Saturation on room air, monitor for volume overload in s/o ARF and hypervolemia     GI  # Hepatorenal syndrome   - See Renal     # ETOH Cirrhosis   - C/w solumedrol 40mg IV qD   - Maddrey score: 52.4 indicative of poor prognosis  - MELD Score: 38 --?65-66% risk of 90 day mortality   - Hepatology consult in am for transplant eval   - C/w lactulose 20mg TID and titrate to 3BM daily   - Pt anuric now, unclear if spironolactone will be helpful  - No evidence of ascites on U/S abd     Renal  # Hepatorenal syndrome  - Suspect acute renal failure 2/2 HRS in s/o alcoholic hepatitis and cirrhosis   - C/w octreotide, albumin gtt  - Start CRRT as per nephrology  - Baseline Cr 2.5  - Monitor Cr and avoid nephrotoxic agents  - Renally dose meds   - MAP goal = ?    # Hyponatremia  - Hypervolemic state in s/o ARF  - Na 114-->118 in 24 hours   - S/p NS boluses while at [place]  - Trend BMP Mg Phos q4   - Expect Na to improve as volume status improves with CRRT   - Avoid rapid correction, goal 6-8 mEq in 24 hrs     # Metabolic acidosis   - Likely 2/2 renal loss of bicarb and inability to excrete H+ in s/o ARF   - C/w NaHCO3 drip   - Expect improvement with CRRT   - Monitor BMP     ID  # SBP PPx   - Ascites?   - Empirically tx with CTX   - Afebrile without leukocytosis so far     Heme  # Macrocytic anemia  - Likely in s/o poor nutritional status from ETOH use  - Check B12 folate iron studies  - Folate thiamine supplement    # Coagulopathy   - In s/o decompensated alcoholic cirrhosis and ARF   - Give DDAVP for likely plt dysfunction     # Portal vein occlusion  - U/S Abd doppler from [facility] revealed main and left portal vein occlusions  - Cirrhosis induced coagulopathy, start AC in the am after catheter access is established     Ethics  # FULL code  56 y.o. F with PMH of ETOH misuse, ETOH cirrhosis, HLD, IBS, anxiety, presents with decompensated alcoholic cirrhosis c/b HRS, likely contributing to acute renal failure and hyponatremia, now requiring CRRT, transferred to [facility] MICU for further management.     Neuro  A&O x 3 at this time    # ETOH Misuse  - Continue to monitor for w/d symptoms; pt reported last drink was 2.5 wks ago  - SW consult & cessation counseling   - Start thiamine and folate     # Anxiety  - C/w Xanax PRN   - See ISTOP     Cardiovascular  # Normotensive at this time with MAP 70s   - Monitor BP while on CRRT, may need pressors (ideally levo given HRS)  - Maintain MAP goal >80 given HRS as per hepatology     Pulmonary  - Normal O2 Saturation on room air, monitor for volume overload in s/o ARF and hypervolemia     GI  # Hepatorenal syndrome   - See Renal     # ETOH Cirrhosis   - Discontinue solumedrol 40mg IV qD and f/u infectious work up as per discussion with hepatology   - Hold off on NAC as may worsen hyponatremia   - Maddrey score: 52.4 indicative of poor prognosis  - MELD Score: 38 --?65-66% risk of 90 day mortality   - Hepatology consult in am for transplant eval   - C/w lactulose 20mg TID and titrate to 3BM daily   - Pt anuric now, unclear if spironolactone will be helpful  - No evidence of ascites on U/S abd   - Trend liver labs daily CMP     Renal  # Hepatorenal syndrome  - Suspect acute renal failure 2/2 HRS in s/o alcoholic hepatitis and cirrhosis   - C/w octreotide, albumin gtt  - Start CRRT as per nephrology  - Baseline Cr 2.5  - Monitor Cr and avoid nephrotoxic agents  - Renally dose meds   - MAP goal 80+    # Hyponatremia  - Euvolemic on exam; possibly 2/2 ARF and or poor oral intake in s/o ETOH use   - F/u urine lytes and serum osm   - Na 114-->118 in 24 hours   - S/p NS boluses while at [place]  - Trend BMP Mg Phos q4   - Expect Na to improve as volume status improves with CRRT   - Avoid rapid correction, goal 6-8 mEq in 24 hrs     # Metabolic acidosis   - Likely 2/2 renal loss of bicarb and inability to excrete H+ in s/o ARF   - C/w NaHCO3 drip   - Expect improvement with CRRT   - Monitor BMP     ID  # SBP PPx   - Ascites?   - Empirically tx with CTX   - Afebrile without leukocytosis so far     Heme  # Macrocytic anemia & Thrombocytopenia   - Likely in s/o poor nutritional status from ETOH use  - Check B12 folate iron studies  - Folate thiamine supplement    # Coagulopathy   - In s/o decompensated alcoholic cirrhosis and ARF   - Give DDAVP for likely plt dysfunction     # Portal vein occlusion  - U/S Abd doppler from [facility] revealed main and left portal vein occlusions  - Cirrhosis induced coagulopathy, start AC in the am after catheter access is established     Ethics  # FULL code  56 y.o. F with PMH of ETOH misuse, ETOH cirrhosis, HLD, IBS, anxiety, presents with decompensated alcoholic cirrhosis c/b HRS, likely contributing to acute renal failure and hyponatremia, now requiring CRRT, transferred to [facility] MICU for further management.     Neuro  A&O x 3 at this time    # ETOH Misuse  - Continue to monitor for w/d symptoms; pt reported last drink was 2.5 wks ago  - SW consult & cessation counseling   - Start thiamine and folate     # Anxiety  - C/w Xanax PRN   - See ISTOP     Cardiovascular  # Normotensive at this time with MAP 70s   - Monitor BP while on CRRT, may need pressors (ideally levo given HRS)  - Maintain MAP goal >80 given HRS as per hepatology     Pulmonary  - Normal O2 Saturation on room air, monitor for volume overload in s/o ARF and hypervolemia     GI  # Hepatorenal syndrome   - See Renal     # ETOH Cirrhosis   - Discontinue solumedrol 40mg IV qD and f/u infectious work up as per discussion with hepatology   - Hold off on NAC as may worsen hyponatremia   - Maddrey score: 52.4 indicative of poor prognosis  - MELD Score: 38 --?65-66% risk of 90 day mortality   - Hepatology consult in am for transplant eval   - C/w lactulose 20mg TID and titrate to 3BM daily   - Pt anuric now, unclear if spironolactone will be helpful  - No evidence of ascites on U/S abd   - Trend liver labs daily CMP     # Hep C (AB+?)   - F/u viral load  - F/u acute hepatitis panel     Renal  # Hepatorenal syndrome  - Suspect acute renal failure 2/2 HRS in s/o alcoholic hepatitis and cirrhosis   - C/w octreotide, albumin gtt  - Start CRRT as per nephrology  - Baseline Cr 2.5  - Monitor Cr and avoid nephrotoxic agents  - Renally dose meds   - MAP goal 80+  - Monitor I/Os     # Hyponatremia  - Euvolemic on exam; possibly 2/2 ARF and or poor oral intake in s/o ETOH use   - F/u urine lytes and serum osm   - Na 114-->118 in 24 hours   - S/p NS boluses while at [place]  - Trend BMP Mg Phos q4   - Expect Na to improve as volume status improves with CRRT   - Avoid rapid correction, goal 6-8 mEq in 24 hrs     # Metabolic acidosis   - Likely 2/2 renal loss of bicarb and inability to excrete H+ in s/o ARF   - D/c NaHCO3 drip as per renal   - Expect improvement with CRRT   - Monitor BMP q4     ID  # SBP PPx   - Mild ascites per [facility]   - Low suspicion for SBP, defer CTX   - Afebrile without leukocytosis so far   - F/u infectious work up including UA, CXR     Heme  # Macrocytic anemia & Thrombocytopenia   - Likely in s/o poor nutritional status from ETOH use  - Check B12 folate iron studies  - Folate thiamine supplement    # Coagulopathy   - In s/o decompensated alcoholic cirrhosis and ARF   - Give DDAVP for likely plt dysfunction   - Trend daily PT INR PTT     # Portal vein occlusion  - U/S Abd doppler from [facility] revealed main and left portal vein occlusions  - Cirrhosis induced coagulopathy, start AC in the am after catheter access is established if H/H is stable     Ethics  # FULL code   -  is surrogate decision maker

## 2021-08-29 NOTE — CONSULT NOTE ADULT - SUBJECTIVE AND OBJECTIVE BOX
[facility] DIVISION OF KIDNEY DISEASES AND HYPERTENSION -- [phone]  -- INITIAL CONSULT NOTE  --------------------------------------------------------------------------------  If any questions, please feel free to contact me  NS pager: [phone], [facility]: [id number]  Devante Garcia M.D.  Nephrology Fellow    --------------------------------------------------------------------------------  HPI:  57 yo f pmhx ETOH abuse (last drink ~2 weeks ago), ETOH cirrhosis with ascites, splenomegaly, HLD, IBS, anxiety presented from home after being sent in by her GI Dr. López for abnl labs.  Per patient over the last week she has noticed her sclera/skin to be bright orange, fatigued, lightheaded, nausea, poor appetite and abdominal bloating.  Patient went for labs as an outpatient 8/27, which were significant for elevated BUN/Cr and bili.  In [place] ED, patient found to be grossly jaundiced, labs significant for Na 114, BUN/Cr 146/16.3, serum co2 14, tbili 27.1, ast/alt 205/113.  Patient given solumedrol, pantoprazole, PO potassium. Patient seen by nephrology, started on NS for the hyponatremia. Patient admitted to critical care service for higher level of care. While in [place] ICU, pt was treated for alcoholic hepatitis, hepatorenal syndrome, and acute renal failure. Pt was seen by nephrology, who recommended initiated of CVVH, which will require transfer to tertiary center. Hyponatremia was treated with NS and patient also received octreotide and albumin for HRS. Pt was also maintained on NaHCO3 drip for metabolic acidosis. Given hypervolemic state, albumin and further IVF and drips were discontinued. Pt was started on 32mg IV solumedrol qD for alcoholic hepatitis. Lactulose was titrated to 20mg TID. Spironolactone was not given due to ARF in s/o HRS. GI was consulted for alcoholic hepatitis as well as possible liver transplant eval, which also agreed to transfer to tertiary center for further evaluation. An ICU to ICU transfer was initiated and pt was transferred to [facility] MICU for further management.  (29 Aug 2021 18:35)    Nephrology consulted for JULIANA and hyponatremia       PAST HISTOR  --------------------------------------------------------------------------------  PAST MEDICAL & SURGICAL HISTORY:  Anxiety    GERD (gastroesophageal reflux disease)    Constipation    Fatty liver    IBS (irritable bowel syndrome)    Liver cirrhosis    HLD (hyperlipidemia)    Claustrophobia    ETOH abuse    Lipoma    H/O colonoscopy    H/O endoscopy    Lipoma of back  2019      FAMILY HISTORY:  Family hx of colon cancer  father    FHx: breast cancer  mother    FH: CAD (coronary artery disease)  father    Family history of stroke  father    Family history of Parkinsonism  father      PAST SOCIAL HISTORY:  +alcohol use, no smoking, no drugs    ALLERGIES & MEDICATIONS  --------------------------------------------------------------------------------  Allergies    No Known Allergies    Intolerances      Standing Inpatient Medications  albumin human 25% IVPB 50 milliLiter(s) IV Intermittent every 6 hours  cefTRIAXone   IVPB 1000 milliGRAM(s) IV Intermittent every 24 hours  chlorhexidine 4% Liquid 1 Application(s) Topical <User Schedule>  CRRT Treatment    <Continuous>  dextrose 40% Gel 15 Gram(s) Oral once  dextrose 50% Injectable 25 Gram(s) IV Push once  dextrose 50% Injectable 12.5 Gram(s) IV Push once  dextrose 50% Injectable 25 Gram(s) IV Push once  folic acid Injectable 1 milliGRAM(s) IV Push daily  glucagon  Injectable 1 milliGRAM(s) IntraMuscular once  methylPREDNISolone sodium succinate Injectable 20 milliGRAM(s) IV Push every 12 hours  midodrine. 10 milliGRAM(s) Oral three times a day  octreotide  Infusion 50 MICROgram(s)/Hr IV Continuous <Continuous>  PrismaSATE Dialysate BGK 4 / 2.5 5000 milliLiter(s) CRRT <Continuous>  PrismaSOL Filtration BGK 4 / 2.5 5000 milliLiter(s) CRRT <Continuous>  PrismaSOL Filtration BGK 4 / 2.5 5000 milliLiter(s) CRRT <Continuous>  sodium bicarbonate  Infusion 0.185 mEq/kG/Hr IV Continuous <Continuous>  thiamine IVPB 500 milliGRAM(s) IV Intermittent every 8 hours    PRN Inpatient Medications      REVIEW OF SYSTEMS  --------------------------------------------------------------------------------  Gen: +malaise +fatigue - No fevers/chills  Skin: No rashes +jaundice  Head/Eyes/Ears: +icteric sclera   Respiratory: +dyspnea, cough  CV: No chest pain  GI: + abd pain,   : No dysuria, hematuria  MSK: +edema  Heme: No easy bruising or bleeding  Psych: No significant depression    All other systems were reviewed and are negative, except as noted.    VITALS/PHYSICAL EXAM  --------------------------------------------------------------------------------  T(C): 37.1 (08-29-21 @ 18:25), Max: 37.1 (08-29-21 @ 18:25)  HR: 71 (08-29-21 @ 19:00) (69 - 79)  BP: 102/59 (08-29-21 @ 19:00) (96/36 - 142/84)  RR: 29 (08-29-21 @ 19:00) (10 - 29)  SpO2: 100% (08-29-21 @ 19:00) (97% - 100%)  Wt(kg): --  Height (cm): 160 (08-29-21 @ 18:25)  Weight (kg): 81 (08-29-21 @ 18:25)  BMI (kg/m2): 31.6 (08-29-21 @ 18:25)  BSA (m2): 1.84 (08-29-21 @ 18:25)      08-29-21 @ 07:01  -  08-29-21 @ 19:47  --------------------------------------------------------  IN: 100 mL / OUT: 30 mL / NET: 70 mL      Physical Exam:  	Gen: NAD  	HEENT: icteric sclera   	Pulm: CTA B/L, non labored breathing   	CV: S1S2, RRR  	Abd: Soft, +BS, mild tenderness to RUQ, +distension  	Ext: No LE edema B/L  	Neuro: Awake, A&O x3  	Skin: Warm and dry, jaundice, spider angiomata.              : +Kathleen cath             	Vascular access: TBD      LABS/STUDIES  --------------------------------------------------------------------------------              9.3    6.91  >-----------<  138      [08-29-21 @ 06:37]              26.0     118  |  79  |  156  ----------------------------<  137      [08-29-21 @ 11:59]  3.5   |  16  |  15.70        Ca     6.4     [08-29-21 @ 11:59]      Mg     2.7     [08-29-21 @ 06:37]      Phos  10.2     [08-29-21 @ 06:37]    TPro  7.3  /  Alb  2.4  /  TBili  25.8  /  DBili  23.8  /  AST  183  /  ALT  110  /  AlkPhos  290  [08-29-21 @ 06:37]    PT/INR: PT 17.4 , INR 1.52       [08-28-21 @ 17:39]  PTT: 44.6       [08-28-21 @ 17:39]      Creatinine Trend:  SCr 15.70 [08-29 @ 11:59]  SCr 16.20 [08-29 @ 06:37]  SCr 16.60 [08-29 @ 02:18]  SCr 16.50 [08-28 @ 23:33]  SCr 16.20 [08-28 @ 17:39]          HCV 1.21, Weakly Reactive Hepatitis C AB  S/CO Ratio                        Interpretation  < 1.00                                   Non-Reactive  1.00 - 4.99                         Weakly-Reactive  >= 5.00                                Reactive  Non-Reactive: A person with a non-reactive HCV antibody result is  considered uninfected.  No further action is needed unless recent  infection is suspected.  In these cases, consider repeat testing later to  detect seroconversion..  Weakly-Reactive: HCV antibody test is abnormal, HCV RNA Qualitative test  will follow.  Reactive: HCV antibody test is abnormal, HCV RNA Qualitative test will  follow.  Note: HCV antibody testing is performed on the [employer] system.      [08-29-21 @ 06:37]

## 2021-08-29 NOTE — H&P ADULT - HISTORY OF PRESENT ILLNESS
57 yo f pmhx ETOH abuse (last drink ~2 weeks ago), ETOH cirrhosis with ascites, splenomegaly, HLD, IBS, anxiety presented from home after being sent in by her GI Dr. López for abnl labs.  Per patient over the last week she has noticed her sclera/skin to be bright orange, fatigued, lightheaded, nausea, poor appetite and abdominal bloating.  Patient went for labs as an outpatient 8/27, which were significant for elevated BUN/Cr and bili.  In [place] ED, patient found to be grossly jaundiced, labs significant for Na 114, BUN/Cr 146/16.3, serum co2 14, tbili 27.1, ast/alt 205/113.  Patient given solumedrol, pantoprazole, PO potassium. Patient seen by nephrology, started on NS for the hyponatremia. Patient admitted to critical care service for higher level of care.     While in ICU, pt was treated for alcoholic hepatitis, hepatorenal syndrome, and acute renal failure. Pt was seen by nephrology, who recommended initiated of CVVH, which will require transfer to tertiary center. Hyponatremia was treated with NS and patient also received octreotide and albumin for HRS. Pt was also maintained on NaHCO3 drip for metabolic acidosis. Given hypervolemic state, albumin and further IVF and drips were discontinued. Pt was started on 32mg IV solumedrol qD for alcoholic hepatitis. Lactulose was titrated to 20mg TID. Spironolactone was not given due to ARF in s/o HRS. GI was consulted for alcoholic hepatitis as well as possible liver transplant eval, which also agreed to transfer to tertiary center for further evaluation. An ICU to ICU transfer was initiated and pt was transferred to [facility] MICU for further management.  55 yo f pmhx ETOH abuse (last drink ~2 weeks ago), ETOH cirrhosis with ascites, splenomegaly, HLD, IBS, anxiety presented from home after being sent in by her GI Dr. López for abnl labs.  Per patient over the last week she has noticed her sclera/skin to be bright orange, fatigued, lightheaded, nausea, poor appetite and abdominal bloating.  Patient went for labs as an outpatient 8/27, which were significant for elevated BUN/Cr and bili.  In [place] ED, patient found to be grossly jaundiced, labs significant for Na 114, BUN/Cr 146/16.3, serum co2 14, tbili 27.1, ast/alt 205/113.  Patient given solumedrol, pantoprazole, PO potassium. Patient seen by nephrology, started on NS for the hyponatremia. Patient admitted to critical care service for higher level of care.     While in [place] ICU, pt was treated for alcoholic hepatitis, hepatorenal syndrome, and acute renal failure. Pt was seen by nephrology, who recommended initiated of CVVH, which will require transfer to tertiary center. Hyponatremia was treated with NS and patient also received octreotide and albumin for HRS. Pt was also maintained on NaHCO3 drip for metabolic acidosis. Given hypervolemic state, albumin and further IVF and drips were discontinued. Pt was started on 32mg IV solumedrol qD for alcoholic hepatitis. Lactulose was titrated to 20mg TID. Spironolactone was not given due to ARF in s/o HRS. GI was consulted for alcoholic hepatitis as well as possible liver transplant eval, which also agreed to transfer to tertiary center for further evaluation. An ICU to ICU transfer was initiated and [facility].     Upon [facility], pt was seen by nephrology and was given DDAVP prior to [facility] placement. CRRT was initiated.

## 2021-08-29 NOTE — H&P ADULT - NSHPSOCIALHISTORY_GEN_ALL_CORE
Last alcohol about 3 weeks ago, states she started drinking since father's death and is overwhelmed with taking care of her 86 y.o mother. She denies tobacco/ illicit drug use.

## 2021-08-29 NOTE — PROGRESS NOTE ADULT - SUBJECTIVE AND OBJECTIVE BOX
CC:    HPI:  55 yo f pmhx ETOH abuse (last drink ~2 weeks ago), ETOH cirrhosis with ascites, splenomegaly, HLD, IBS, anxiety presented from home after being sent in by her GI Dr. López for abnormal labs.  Per patient over the last week she has noticed her sclera/skin to be bright orange, fatigued, lightheaded, nausea, poor appetite and abdominal bloating.  Patient went for labs as an outpatient yesterday, that were significant for elevated BUN/Cr and bili.  In ED patient found to be grossly jaundiced, labs significant for Na 114, BUN/Cr 146/16.3, serum co2 14, tbili 27.1, ast/alt 205/113.  Patient given solumedrol, pantoprazole, PO potassium. Patient seen by nephrology, started on NS for the hyponatremia. Patient admitted to critical care service for higher level of care.    8/29: Patient seen in bed.  She is awake and alert jaundiced she remains anuric and her sodium is 116.  She is on Levophed, Octotride, and a bicarb drip.         PAST MEDICAL & SURGICAL HISTORY:  Anxiety    GERD (gastroesophageal reflux disease)    Constipation    Fatty liver    IBS (irritable bowel syndrome)    Liver cirrhosis    HLD (hyperlipidemia)    Claustrophobia    ETOH abuse    Lipoma    H/O colonoscopy    H/O endoscopy    Lipoma of back  2019        FAMILY HISTORY:  Family hx of colon cancer  father    FHx: breast cancer  mother    FH: CAD (coronary artery disease)  father    Family history of stroke  father    Family history of Parkinsonism  father        Social Hx:    Allergies    No Known Allergies    Intolerances        Height (cm): 160 (08-28 @ 15:28)  Weight (kg): 79.4 (08-28 @ 15:28)  BMI (kg/m2): 31 (08-28 @ 15:28)    ICU Vital Signs Last 24 Hrs  T(C): 37 (29 Aug 2021 05:30), Max: 37 (29 Aug 2021 05:30)  T(F): 98.6 (29 Aug 2021 05:30), Max: 98.6 (29 Aug 2021 05:30)  HR: 72 (29 Aug 2021 10:00) (66 - 79)  BP: 123/61 (29 Aug 2021 10:00) (96/36 - 130/65)  BP(mean): 77 (29 Aug 2021 10:00) (49 - 78)  ABP: --  ABP(mean): --  RR: 11 (29 Aug 2021 10:00) (10 - 20)  SpO2: 100% (29 Aug 2021 10:00) (97% - 100%)          I&O's Summary    28 Aug 2021 07:01  -  29 Aug 2021 07:00  --------------------------------------------------------  IN: 260 mL / OUT: 10 mL / NET: 250 mL                              9.3    6.91  )-----------( 138      ( 29 Aug 2021 06:37 )             26.0       08-29    116<LL>  |  77<L>  |  152<H>  ----------------------------<  148<H>  3.6   |  12<L>  |  16.20<H>    Ca    6.5<LL>      29 Aug 2021 06:37  Phos  10.2     08-29  Mg     2.7     08-29    TPro  7.3  /  Alb  2.4<L>  /  TBili  25.8<H>  /  DBili  23.8<H>  /  AST  183<H>  /  ALT  110<H>  /  AlkPhos  290<H>  08-29                    MEDICATIONS  (STANDING):  albumin human 25% IVPB 25 Gram(s) IV Intermittent every 8 hours  chlorhexidine 4% Liquid 1 Application(s) Topical <User Schedule>  heparin   Injectable 5000 Unit(s) SubCutaneous every 12 hours  lactulose Syrup 20 Gram(s) Oral three times a day  methylPREDNISolone sodium succinate Injectable 32 milliGRAM(s) IV Push daily  octreotide  Infusion 50 MICROgram(s)/Hr (10 mL/Hr) IV Continuous <Continuous>  sodium bicarbonate  Infusion 0.189 mEq/kG/Hr (100 mL/Hr) IV Continuous <Continuous>    MEDICATIONS  (PRN):  LORazepam   Injectable 2 milliGRAM(s) IV Push every 1 hour PRN CIWA-Ar score 8 or greater      DVT Prophylaxis: [facility]    Advanced Directives:  Discussed with:    Visit Information: 90 min    ** Time is exclusive of billed procedures and/or teaching and/or routine family updates.

## 2021-08-29 NOTE — DISCHARGE NOTE PROVIDER - NSDCCPCAREPLAN_GEN_ALL_CORE_FT
PRINCIPAL DISCHARGE DIAGNOSIS  Diagnosis: Hyperbilirubinemia  Assessment and Plan of Treatment:       SECONDARY DISCHARGE DIAGNOSES  Diagnosis: Hepatorenal syndrome  Assessment and Plan of Treatment:     Diagnosis: Acute renal failure  Assessment and Plan of Treatment:     Diagnosis: Liver cirrhosis  Assessment and Plan of Treatment:

## 2021-08-29 NOTE — DISCHARGE NOTE PROVIDER - NSDCMRMEDTOKEN_GEN_ALL_CORE_FT
atorvastatin 20 mg oral tablet: 1 tab(s) orally once a day-HS  Lasix 40 mg oral tablet: 1 tab(s) orally once a day  Protonix 40 mg oral granule, delayed release: 1 each orally once a day  spironolactone 100 mg oral tablet: 1 tab(s) orally once a day

## 2021-08-30 NOTE — PROGRESS NOTE ADULT - ASSESSMENT
56 y.o. Female with PMhx ETOH use disorder (last drink ~2 weeks ago), ETOH cirrhosis with ascites, splenomegaly, HLD, IBS, anxiety presented from [facility] for severe hyponatremia and JULIANA requiring CRRT. Nephrology consulted for JULIANA and hyponatremia.       #JULIANA  Pt with JULIANA in the setting of severe liver disease.   Upon review of [facility] HIE/[employer] last Cr was in 03/2019 and Cr 0.89 mg/dl.   Upon admission to OSH Cr was 16 (8/28/21) with .   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   initiated on CRRT 8/29 with low prescription (20ml/kg/hr) for slow clearance and prevent overcorrection of sodium   remains oliguric JULIANA   goal for volume is to maintain NET EVEN   Monitor labs and urine output.   Avoid NSAIDs, ACEI/ARBS, RCA and nephrotoxins.   Dose medications as per CRRT.     #hyponatremia   Pt. with severe hyponatremia in setting of severe liver disease  Serum sodium was 114 on admission to OSH (8/28); corrected to 126 this morning   - started patient on D5W at 100 cc/hr to prevent further overcorrection   serum osm 317; urine osm 340; urine Na 20  --- elevated serum osm likely in the setting of hyperbilirubinemia and uremia  Monitor serum sodium Q4 hours.  Do not correct serum sodium >6-8 meq/day.    If any questions, please feel free to contact me     Santy Singh  Nephrology Fellow  [facility] Pager: [phone]

## 2021-08-30 NOTE — PROGRESS NOTE ADULT - SUBJECTIVE AND OBJECTIVE BOX
MICU PROGRESS NOTE    INTERVAL HPI/OVERNIGHT EVENTS:    SUBJECTIVE:     OBJECTIVE:    VITAL SIGNS:  ICU Vital Signs Last 24 Hrs  T(C): 36.4 (30 Aug 2021 04:00), Max: 37.1 (29 Aug 2021 18:25)  T(F): 97.5 (30 Aug 2021 04:00), Max: 98.7 (29 Aug 2021 18:25)  HR: 89 (30 Aug 2021 07:45) (64 - 94)  BP: 118/58 (30 Aug 2021 07:45) (71/40 - 142/84)  BP(mean): 85 (30 Aug 2021 07:45) (49 - 97)  ABP: --  ABP(mean): --  RR: 28 (30 Aug 2021 07:45) (10 - 43)  SpO2: 98% (30 Aug 2021 07:45) (94% - 100%)      VENTS:      I&O:     @ 07:  -   @ 07:00  --------------------------------------------------------  IN: 1384.2 mL / OUT: 832 mL / NET: 552.2 mL     @ 07:  -   @ 08:02  --------------------------------------------------------  IN: 133.2 mL / OUT: 10 mL / NET: 123.2 mL        PHYSICAL EXAM:  GENERAL: NAD, conversant  CHEST/LUNG: Clear to auscultation bilaterally; No crackles, rhonchi, wheezing, or rubs  HEART: Regular rate and rhythm; No murmurs, rubs, or gallops  ABDOMEN: Soft, Nontender, Nondistended; Bowel sounds present  EXTREMITIES:  2+ Peripheral Pulses, No clubbing, cyanosis, or edema  SKIN: No rashes or lesions  NERVOUS SYSTEM:  Alert & Oriented, Good concentration      LINES:                                       MEDICATIONS:  MEDICATIONS  (STANDING):  albumin human 25% IVPB 50 milliLiter(s) IV Intermittent every 6 hours  chlorhexidine 4% Liquid 1 Application(s) Topical <User Schedule>  chlorhexidine 4% Liquid 1 Application(s) Topical <User Schedule>  CRRT Treatment    <Continuous>  dextrose 40% Gel 15 Gram(s) Oral once  dextrose 5%. 1000 milliLiter(s) (100 mL/Hr) IV Continuous <Continuous>  dextrose 50% Injectable 25 Gram(s) IV Push once  dextrose 50% Injectable 12.5 Gram(s) IV Push once  dextrose 50% Injectable 25 Gram(s) IV Push once  folic acid Injectable 1 milliGRAM(s) IV Push daily  glucagon  Injectable 1 milliGRAM(s) IntraMuscular once  midodrine. 10 milliGRAM(s) Oral three times a day  norepinephrine Infusion 0.05 MICROgram(s)/kG/Min (7.59 mL/Hr) IV Continuous <Continuous>  octreotide  Infusion 50 MICROgram(s)/Hr (10 mL/Hr) IV Continuous <Continuous>  piperacillin/tazobactam IVPB.. 3.375 Gram(s) IV Intermittent every 12 hours  PrismaSATE Dialysate BGK 4 / 2.5 5000 milliLiter(s) (900 mL/Hr) CRRT <Continuous>  PrismaSOL Filtration BGK 4 / 2.5 5000 milliLiter(s) (700 mL/Hr) CRRT <Continuous>  PrismaSOL Filtration BGK 4 / 2.5 5000 milliLiter(s) (200 mL/Hr) CRRT <Continuous>  thiamine IVPB 500 milliGRAM(s) IV Intermittent every 8 hours    MEDICATIONS  (PRN):  ALPRAZolam 0.5 milliGRAM(s) Oral every 12 hours PRN Anxiety  sodium chloride 0.9% lock flush 10 milliLiter(s) IV Push every 1 hour PRN Pre/post blood products, medications, blood draw, and to maintain line patency      ALLERGIES:  Allergies    No Known Allergies    Intolerances        LABS:                        8.2    6.57  )-----------( 98       ( 29 Aug 2021 21:45 )             22.2     08-30    125<L>  |  81<L>  |  114<H>  ----------------------------<  181<H>  3.4<L>   |  14<L>  |  9.87<H>    Ca    7.3<L>      30 Aug 2021 04:15  Phos  7.7       Mg     2.3         TPro  6.8  /  Alb  3.4  /  TBili  27.0<H>  /  DBili  x   /  AST  151<H>  /  ALT  86<H>  /  AlkPhos  248<H>      PT/INR - ( 29 Aug 2021 21:01 )   PT: 19.8 sec;   INR: 1.69 ratio         PTT - ( 29 Aug 2021 21:01 )  PTT:40.1 sec  Urinalysis Basic - ( 30 Aug 2021 00:21 )    Color: Dark Yellow / Appearance: Turbid / S.018 / pH: x  Gluc: x / Ketone: Negative  / Bili: Moderate / Urobili: 2 mg/dL   Blood: x / Protein: 100 / Nitrite: Negative   Leuk Esterase: Large / RBC: 5 /hpf /  /HPF   Sq Epi: x / Non Sq Epi: 1 /hpf / Bacteria: Moderate        CAPILLARY BLOOD GLUCOSE          RADIOLOGY & ADDITIONAL TESTS: Reviewed. MICU PROGRESS NOTE    INTERVAL HPI/OVERNIGHT EVENTS: Admitted yesterday evening.    SUBJECTIVE: Without complaints. Feeling well overall. Aao x2-3. Oriented to self and location, states correct year but wrong month    OBJECTIVE:    VITAL SIGNS:  ICU Vital Signs Last 24 Hrs  T(C): 36.4 (30 Aug 2021 04:00), Max: 37.1 (29 Aug 2021 18:25)  T(F): 97.5 (30 Aug 2021 04:00), Max: 98.7 (29 Aug 2021 18:25)  HR: 89 (30 Aug 2021 07:45) (64 - 94)  BP: 118/58 (30 Aug 2021 07:45) (71/40 - 142/84)  BP(mean): 85 (30 Aug 2021 07:45) (49 - 97)  ABP: --  ABP(mean): --  RR: 28 (30 Aug 2021 07:45) (10 - 43)  SpO2: 98% (30 Aug 2021 07:45) (94% - 100%)      VENTS:      I&O:     @ : @ 07:00  --------------------------------------------------------  IN: 1384.2 mL / OUT: 832 mL / NET: 552.2 mL     @ 07: @ 08:02  --------------------------------------------------------  IN: 133.2 mL / OUT: 10 mL / NET: 123.2 mL        PHYSICAL EXAM:  GENERAL: NAD, conversant  HEENT: +scleral icterus  CHEST/LUNG: Clear to auscultation bilaterally; No crackles, rhonchi, wheezing, or rubs  HEART: Regular rate and rhythm; No murmurs, rubs, or gallops  ABDOMEN: Soft, Nontender, Nondistended; Bowel sounds present  EXTREMITIES: no edema  SKIN: No rashes or lesions  NERVOUS SYSTEM:  Alert & Oriented x2-3, poor concentration     + asterixis  LINES:            DREW reid    MEDICATIONS:  MEDICATIONS  (STANDING):  albumin human 25% IVPB 50 milliLiter(s) IV Intermittent every 6 hours  chlorhexidine 4% Liquid 1 Application(s) Topical <User Schedule>  chlorhexidine 4% Liquid 1 Application(s) Topical <User Schedule>  CRRT Treatment    <Continuous>  dextrose 40% Gel 15 Gram(s) Oral once  dextrose 5%. 1000 milliLiter(s) (100 mL/Hr) IV Continuous <Continuous>  dextrose 50% Injectable 25 Gram(s) IV Push once  dextrose 50% Injectable 12.5 Gram(s) IV Push once  dextrose 50% Injectable 25 Gram(s) IV Push once  folic acid Injectable 1 milliGRAM(s) IV Push daily  glucagon  Injectable 1 milliGRAM(s) IntraMuscular once  midodrine. 10 milliGRAM(s) Oral three times a day  norepinephrine Infusion 0.05 MICROgram(s)/kG/Min (7.59 mL/Hr) IV Continuous <Continuous>  octreotide  Infusion 50 MICROgram(s)/Hr (10 mL/Hr) IV Continuous <Continuous>  piperacillin/tazobactam IVPB.. 3.375 Gram(s) IV Intermittent every 12 hours  PrismaSATE Dialysate BGK 4 / 2.5 5000 milliLiter(s) (900 mL/Hr) CRRT <Continuous>  PrismaSOL Filtration BGK 4 / 2.5 5000 milliLiter(s) (700 mL/Hr) CRRT <Continuous>  PrismaSOL Filtration BGK 4 / 2.5 5000 milliLiter(s) (200 mL/Hr) CRRT <Continuous>  thiamine IVPB 500 milliGRAM(s) IV Intermittent every 8 hours    MEDICATIONS  (PRN):  ALPRAZolam 0.5 milliGRAM(s) Oral every 12 hours PRN Anxiety  sodium chloride 0.9% lock flush 10 milliLiter(s) IV Push every 1 hour PRN Pre/post blood products, medications, blood draw, and to maintain line patency      ALLERGIES:  Allergies    No Known Allergies    Intolerances        LABS:                        8.2    6.57  )-----------( 98       ( 29 Aug 2021 21:45 )             22.2     08-30    125<L>  |  81<L>  |  114<H>  ----------------------------<  181<H>  3.4<L>   |  14<L>  |  9.87<H>    Ca    7.3<L>      30 Aug 2021 04:15  Phos  7.7       Mg     2.3         TPro  6.8  /  Alb  3.4  /  TBili  27.0<H>  /  DBili  x   /  AST  151<H>  /  ALT  86<H>  /  AlkPhos  248<H>      PT/INR - ( 29 Aug 2021 21:01 )   PT: 19.8 sec;   INR: 1.69 ratio         PTT - ( 29 Aug 2021 21:01 )  PTT:40.1 sec  Urinalysis Basic - ( 30 Aug 2021 00:21 )    Color: Dark Yellow / Appearance: Turbid / S.018 / pH: x  Gluc: x / Ketone: Negative  / Bili: Moderate / Urobili: 2 mg/dL   Blood: x / Protein: 100 / Nitrite: Negative   Leuk Esterase: Large / RBC: 5 /hpf /  /HPF   Sq Epi: x / Non Sq Epi: 1 /hpf / Bacteria: Moderate        CAPILLARY BLOOD GLUCOSE          RADIOLOGY & ADDITIONAL TESTS: Reviewed. MICU PROGRESS NOTE    INTERVAL HPI/OVERNIGHT EVENTS: Admitted yesterday evening.      SUBJECTIVE: Without complaints. Feeling well overall. Aao x2-3. Oriented to self and location, states correct year but wrong month    OBJECTIVE:    VITAL SIGNS:  ICU Vital Signs Last 24 Hrs  T(C): 36.4 (30 Aug 2021 04:00), Max: 37.1 (29 Aug 2021 18:25)  T(F): 97.5 (30 Aug 2021 04:00), Max: 98.7 (29 Aug 2021 18:25)  HR: 89 (30 Aug 2021 07:45) (64 - 94)  BP: 118/58 (30 Aug 2021 07:45) (71/40 - 142/84)  BP(mean): 85 (30 Aug 2021 07:45) (49 - 97)  ABP: --  ABP(mean): --  RR: 28 (30 Aug 2021 07:45) (10 - 43)  SpO2: 98% (30 Aug 2021 07:45) (94% - 100%)      VENTS:      I&O:     @ : @ 07:00  --------------------------------------------------------  IN: 1384.2 mL / OUT: 832 mL / NET: 552.2 mL     @ 07: @ 08:02  --------------------------------------------------------  IN: 133.2 mL / OUT: 10 mL / NET: 123.2 mL        PHYSICAL EXAM:  GENERAL: NAD, conversant  HEENT: +scleral icterus  CHEST/LUNG: Clear to auscultation bilaterally; No crackles, rhonchi, wheezing, or rubs  HEART: Regular rate and rhythm; No murmurs, rubs, or gallops  ABDOMEN: Soft, Nontender, Nondistended; Bowel sounds present  EXTREMITIES: no edema  SKIN: No rashes or lesions  NERVOUS SYSTEM:  Alert & Oriented x2-3, poor concentration     + asterixis  LINES:            DREW reid    MEDICATIONS:  MEDICATIONS  (STANDING):  albumin human 25% IVPB 50 milliLiter(s) IV Intermittent every 6 hours  chlorhexidine 4% Liquid 1 Application(s) Topical <User Schedule>  chlorhexidine 4% Liquid 1 Application(s) Topical <User Schedule>  CRRT Treatment    <Continuous>  dextrose 40% Gel 15 Gram(s) Oral once  dextrose 5%. 1000 milliLiter(s) (100 mL/Hr) IV Continuous <Continuous>  dextrose 50% Injectable 25 Gram(s) IV Push once  dextrose 50% Injectable 12.5 Gram(s) IV Push once  dextrose 50% Injectable 25 Gram(s) IV Push once  folic acid Injectable 1 milliGRAM(s) IV Push daily  glucagon  Injectable 1 milliGRAM(s) IntraMuscular once  midodrine. 10 milliGRAM(s) Oral three times a day  norepinephrine Infusion 0.05 MICROgram(s)/kG/Min (7.59 mL/Hr) IV Continuous <Continuous>  octreotide  Infusion 50 MICROgram(s)/Hr (10 mL/Hr) IV Continuous <Continuous>  piperacillin/tazobactam IVPB.. 3.375 Gram(s) IV Intermittent every 12 hours  PrismaSATE Dialysate BGK 4 / 2.5 5000 milliLiter(s) (900 mL/Hr) CRRT <Continuous>  PrismaSOL Filtration BGK 4 / 2.5 5000 milliLiter(s) (700 mL/Hr) CRRT <Continuous>  PrismaSOL Filtration BGK 4 / 2.5 5000 milliLiter(s) (200 mL/Hr) CRRT <Continuous>  thiamine IVPB 500 milliGRAM(s) IV Intermittent every 8 hours    MEDICATIONS  (PRN):  ALPRAZolam 0.5 milliGRAM(s) Oral every 12 hours PRN Anxiety  sodium chloride 0.9% lock flush 10 milliLiter(s) IV Push every 1 hour PRN Pre/post blood products, medications, blood draw, and to maintain line patency      ALLERGIES:  Allergies    No Known Allergies    Intolerances        LABS:                        8.2    6.57  )-----------( 98       ( 29 Aug 2021 21:45 )             22.2     08-30    125<L>  |  81<L>  |  114<H>  ----------------------------<  181<H>  3.4<L>   |  14<L>  |  9.87<H>    Ca    7.3<L>      30 Aug 2021 04:15  Phos  7.7       Mg     2.3         TPro  6.8  /  Alb  3.4  /  TBili  27.0<H>  /  DBili  x   /  AST  151<H>  /  ALT  86<H>  /  AlkPhos  248<H>      PT/INR - ( 29 Aug 2021 21:01 )   PT: 19.8 sec;   INR: 1.69 ratio         PTT - ( 29 Aug 2021 21:01 )  PTT:40.1 sec  Urinalysis Basic - ( 30 Aug 2021 00:21 )    Color: Dark Yellow / Appearance: Turbid / S.018 / pH: x  Gluc: x / Ketone: Negative  / Bili: Moderate / Urobili: 2 mg/dL   Blood: x / Protein: 100 / Nitrite: Negative   Leuk Esterase: Large / RBC: 5 /hpf /  /HPF   Sq Epi: x / Non Sq Epi: 1 /hpf / Bacteria: Moderate        CAPILLARY BLOOD GLUCOSE          RADIOLOGY & ADDITIONAL TESTS: Reviewed.

## 2021-08-30 NOTE — CONSULT NOTE ADULT - SUBJECTIVE AND OBJECTIVE BOX
Chief Complaint:  Patient is a 56y old  Female who presents with a chief complaint of CRRT (30 Aug 2021 08:01)      HPI:    Allergies:  No Known Allergies      Home Medications:    Hospital Medications:  albumin human 25% IVPB 50 milliLiter(s) IV Intermittent every 6 hours  ALPRAZolam 0.5 milliGRAM(s) Oral every 12 hours PRN  chlorhexidine 4% Liquid 1 Application(s) Topical <User Schedule>  chlorhexidine 4% Liquid 1 Application(s) Topical <User Schedule>  CRRT Treatment    <Continuous>  dextrose 40% Gel 15 Gram(s) Oral once  dextrose 5%. 1000 milliLiter(s) IV Continuous <Continuous>  dextrose 50% Injectable 25 Gram(s) IV Push once  dextrose 50% Injectable 12.5 Gram(s) IV Push once  dextrose 50% Injectable 25 Gram(s) IV Push once  folic acid Injectable 1 milliGRAM(s) IV Push daily  glucagon  Injectable 1 milliGRAM(s) IntraMuscular once  midodrine. 10 milliGRAM(s) Oral three times a day  norepinephrine Infusion 0.05 MICROgram(s)/kG/Min IV Continuous <Continuous>  octreotide  Infusion 50 MICROgram(s)/Hr IV Continuous <Continuous>  piperacillin/tazobactam IVPB.. 3.375 Gram(s) IV Intermittent every 12 hours  PrismaSATE Dialysate BGK 4 / 2.5 5000 milliLiter(s) CRRT <Continuous>  PrismaSOL Filtration BGK 4 / 2.5 5000 milliLiter(s) CRRT <Continuous>  PrismaSOL Filtration BGK 4 / 2.5 5000 milliLiter(s) CRRT <Continuous>  sodium chloride 0.9% lock flush 10 milliLiter(s) IV Push every 1 hour PRN  thiamine IVPB 500 milliGRAM(s) IV Intermittent every 8 hours      PMHX/PSHX:  Anxiety    GERD (gastroesophageal reflux disease)    Constipation    Fatty liver    IBS (irritable bowel syndrome)    Liver cirrhosis    HLD (hyperlipidemia)    Claustrophobia    ETOH abuse    Lipoma    H/O colonoscopy    H/O endoscopy    Lipoma of back        Family history:  Family hx of colon cancer    FHx: breast cancer    FH: CAD (coronary artery disease)    Family history of stroke    Family history of Parkinsonism        Social History:     ROS:     General:  No weight loss, fevers, chills, night sweats, fatigue  Eyes:  No vision changes, no yellowing of eyes   ENT:  No throat pain, runny nose  CV:  No chest pain, palpitations  Resp:  No SOB, cough, wheezing  GI:  See HPI  :  No burning with urination, no hematuria   Muscle:  No muscle pain, weakness  Neuro:  No numbness/tingling, memory problems  Psych:  No fatigue, insomnia, mood problems  Heme:  No easy bruisability  Skin:  No rash, itching       PHYSICAL EXAM:     GENERAL:  Appears stated age, well-groomed, well-nourished, no distress  HEENT:  NC/AT,  conjunctivae clear and pink,  no JVD  CHEST:  Full & symmetric excursion, no increased effort, breath sounds clear  HEART:  Regular rhythm, S1, S2, no murmur/rub/S3/S4, no abdominal bruit, no edema  ABDOMEN:  Soft, non-tender, non-distended, normoactive bowel sounds,  no masses ,  EXTREMITIES:  no cyanosis,clubbing or edema  SKIN:  No rash/erythema/ecchymoses/petechiae/wounds/abscess/warm/dry  NEURO:  Alert, oriented    Vital Signs:  Vital Signs Last 24 Hrs  T(C): 36.4 (30 Aug 2021 04:00), Max: 37.1 (29 Aug 2021 18:25)  T(F): 97.5 (30 Aug 2021 04:00), Max: 98.7 (29 Aug 2021 18:25)  HR: 89 (30 Aug 2021 07:45) (64 - 94)  BP: 118/58 (30 Aug 2021 07:45) (71/40 - 142/84)  BP(mean): 85 (30 Aug 2021 07:45) (49 - 97)  RR: 28 (30 Aug 2021 07:45) (10 - 43)  SpO2: 98% (30 Aug 2021 07:45) (94% - 100%)  Daily Height in cm: 160.02 (29 Aug 2021 18:25)    Daily     LABS:                        8.2    6.57  )-----------( 98       ( 29 Aug 2021 21:45 )             22.2     08-30    125<L>  |  81<L>  |  114<H>  ----------------------------<  181<H>  3.4<L>   |  14<L>  |  9.87<H>    Ca    7.3<L>      30 Aug 2021 04:15  Phos  7.7       Mg     2.3         TPro  6.8  /  Alb  3.4  /  TBili  27.0<H>  /  DBili  x   /  AST  151<H>  /  ALT  86<H>  /  AlkPhos  248<H>      LIVER FUNCTIONS - ( 30 Aug 2021 04:15 )  Alb: 3.4 g/dL / Pro: 6.8 g/dL / ALK PHOS: 248 U/L / ALT: 86 U/L / AST: 151 U/L / GGT: x           PT/INR - ( 29 Aug 2021 21:01 )   PT: 19.8 sec;   INR: 1.69 ratio         PTT - ( 29 Aug 2021 21:01 )  PTT:40.1 sec  Urinalysis Basic - ( 30 Aug 2021 00:21 )    Color: Dark Yellow / Appearance: Turbid / S.018 / pH: x  Gluc: x / Ketone: Negative  / Bili: Moderate / Urobili: 2 mg/dL   Blood: x / Protein: 100 / Nitrite: Negative   Leuk Esterase: Large / RBC: 5 /hpf /  /HPF   Sq Epi: x / Non Sq Epi: 1 /hpf / Bacteria: Moderate      Amylase Serum--      Lipase serum--       [id number]      Imaging:             Chief Complaint:  Patient is a 56y old  Female who presents with a chief complaint of CRRT (30 Aug 2021 08:01)      HPI: 55 yo f pmhx ETOH abuse (last drink ~2 weeks ago), ETOH cirrhosis c/b ascites, splenomegaly, IBS who was transferred from [place] ICU . She initially presented to [place] ED from home after being sent in by her GI Dr. López for abnl labs.  Per patient over the last week she has noticed her sclera/skin to be bright orange, fatigued, lightheaded, nausea, poor appetite and abdominal bloating.  Patient went for labs as an outpatient , which were significant for elevated BUN/Cr and bili.  In [place] ED, patient found to be grossly jaundiced, labs significant for Na 114, BUN/Cr 146/16.3, serum co2 14, tbili 27.1, ast/alt 205/113.  Patient given solumedrol, pantoprazole, PO potassium. Patient seen by nephrology, started on NS for the hyponatremia. Patient admitted ICU for higher level of care.     While in [place] ICU, pt was treated for alcoholic hepatitis, hepatorenal syndrome, and acute renal failure. Pt was seen by nephrology, who recommended transfer to tertiary care center for initiation of CVVH. Hyponatremia was treated with NS and patient also received octreotide and albumin for HRS. Pt was also maintained on NaHCO3 drip for metabolic acidosis. Given hypervolemic state, albumin and further IVF and drips were discontinued. Pt was started on 32mg IV solumedrol qD for alcoholic hepatitis. Lactulose was titrated to 20mg TID. Spironolactone was not given due to ARF in s/o HRS. GI was consulted for alcoholic hepatitis as well as possible liver transplant eval, which also agreed to transfer to tertiary center for further evaluation. An ICU to ICU transfer was initiated and pt was transferred to [facility] MICU for further management.     Upon arrival to [facility] MICU, pt was seen by nephrology and was given DDAVP prior to Shiley placement. CRRT was initiated.       Allergies:  No Known Allergies      Home Medications:    Hospital Medications:  albumin human 25% IVPB 50 milliLiter(s) IV Intermittent every 6 hours  ALPRAZolam 0.5 milliGRAM(s) Oral every 12 hours PRN  chlorhexidine 4% Liquid 1 Application(s) Topical <User Schedule>  chlorhexidine 4% Liquid 1 Application(s) Topical <User Schedule>  CRRT Treatment    <Continuous>  dextrose 40% Gel 15 Gram(s) Oral once  dextrose 5%. 1000 milliLiter(s) IV Continuous <Continuous>  dextrose 50% Injectable 25 Gram(s) IV Push once  dextrose 50% Injectable 12.5 Gram(s) IV Push once  dextrose 50% Injectable 25 Gram(s) IV Push once  folic acid Injectable 1 milliGRAM(s) IV Push daily  glucagon  Injectable 1 milliGRAM(s) IntraMuscular once  midodrine. 10 milliGRAM(s) Oral three times a day  norepinephrine Infusion 0.05 MICROgram(s)/kG/Min IV Continuous <Continuous>  octreotide  Infusion 50 MICROgram(s)/Hr IV Continuous <Continuous>  piperacillin/tazobactam IVPB.. 3.375 Gram(s) IV Intermittent every 12 hours  PrismaSATE Dialysate BGK 4 / 2.5 5000 milliLiter(s) CRRT <Continuous>  PrismaSOL Filtration BGK 4 / 2.5 5000 milliLiter(s) CRRT <Continuous>  PrismaSOL Filtration BGK 4 / 2.5 5000 milliLiter(s) CRRT <Continuous>  sodium chloride 0.9% lock flush 10 milliLiter(s) IV Push every 1 hour PRN  thiamine IVPB 500 milliGRAM(s) IV Intermittent every 8 hours      PMHX/PSHX:  Anxiety    GERD (gastroesophageal reflux disease)    Constipation    Fatty liver    IBS (irritable bowel syndrome)    Liver cirrhosis    HLD (hyperlipidemia)    Claustrophobia    ETOH abuse    Lipoma    H/O colonoscopy    H/O endoscopy    Lipoma of back        Family history:  Family hx of colon cancer    FHx: breast cancer    FH: CAD (coronary artery disease)    Family history of stroke    Family history of Parkinsonism        Social History:     ROS:     General:  No weight loss, fevers, chills, night sweats, fatigue  Eyes:  No vision changes, no yellowing of eyes   ENT:  No throat pain, runny nose  CV:  No chest pain, palpitations  Resp:  No SOB, cough, wheezing  GI:  See HPI  :  No burning with urination, no hematuria   Muscle:  No muscle pain, weakness  Neuro:  No numbness/tingling, memory problems  Psych:  No fatigue, insomnia, mood problems  Heme:  No easy bruisability  Skin:  No rash, itching       PHYSICAL EXAM:     GENERAL:  Appears stated age, well-groomed, well-nourished, no distress  HEENT:  NC/AT,  conjunctivae clear and pink,  no JVD  CHEST:  Full & symmetric excursion, no increased effort, breath sounds clear  HEART:  Regular rhythm, S1, S2, no murmur/rub/S3/S4, no abdominal bruit, no edema  ABDOMEN:  Soft, non-tender, non-distended, normoactive bowel sounds,  no masses ,  EXTREMITIES:  no cyanosis,clubbing or edema  SKIN:  No rash/erythema/ecchymoses/petechiae/wounds/abscess/warm/dry  NEURO:  Alert, oriented    Vital Signs:  Vital Signs Last 24 Hrs  T(C): 36.4 (30 Aug 2021 04:00), Max: 37.1 (29 Aug 2021 18:25)  T(F): 97.5 (30 Aug 2021 04:00), Max: 98.7 (29 Aug 2021 18:25)  HR: 89 (30 Aug 2021 07:45) (64 - 94)  BP: 118/58 (30 Aug 2021 07:45) (71/40 - 142/84)  BP(mean): 85 (30 Aug 2021 07:45) (49 - 97)  RR: 28 (30 Aug 2021 07:45) (10 - 43)  SpO2: 98% (30 Aug 2021 07:45) (94% - 100%)  Daily Height in cm: 160.02 (29 Aug 2021 18:25)    Daily     LABS:                        8.2    6.57  )-----------( 98       ( 29 Aug 2021 21:45 )             22.2     08-30    125<L>  |  81<L>  |  114<H>  ----------------------------<  181<H>  3.4<L>   |  14<L>  |  9.87<H>    Ca    7.3<L>      30 Aug 2021 04:15  Phos  7.7     -  Mg     2.3         TPro  6.8  /  Alb  3.4  /  TBili  27.0<H>  /  DBili  x   /  AST  151<H>  /  ALT  86<H>  /  AlkPhos  248<H>      LIVER FUNCTIONS - ( 30 Aug 2021 04:15 )  Alb: 3.4 g/dL / Pro: 6.8 g/dL / ALK PHOS: 248 U/L / ALT: 86 U/L / AST: 151 U/L / GGT: x           PT/INR - ( 29 Aug 2021 21:01 )   PT: 19.8 sec;   INR: 1.69 ratio         PTT - ( 29 Aug 2021 21:01 )  PTT:40.1 sec  Urinalysis Basic - ( 30 Aug 2021 00:21 )    Color: Dark Yellow / Appearance: Turbid / S.018 / pH: x  Gluc: x / Ketone: Negative  / Bili: Moderate / Urobili: 2 mg/dL   Blood: x / Protein: 100 / Nitrite: Negative   Leuk Esterase: Large / RBC: 5 /hpf /  /HPF   Sq Epi: x / Non Sq Epi: 1 /hpf / Bacteria: Moderate      Amylase Serum--      Lipase serum--       [id number]      Imaging:             Chief Complaint:  Patient is a 56y old  Female who presents with a chief complaint of CRRT (30 Aug 2021 08:01)      HPI: 55 yo f pmhx ETOH abuse (last drink ~2 weeks ago), ETOH cirrhosis c/b ascites, splenomegaly, IBS who was transferred from [place] ICU . She initially presented to [place] ED from home after being sent in by her GI Dr. López for abnl labs.  Per patient over the last week she has noticed her sclera/skin to be bright orange, fatigued, lightheaded, nausea, poor appetite and abdominal bloating.  Patient went for labs as an outpatient , which were significant for elevated BUN/Cr and bili.  In [place] ED, patient found to be grossly jaundiced, labs significant for Na 114, BUN/Cr 146/16.3, serum co2 14, tbili 27.1, ast/alt 205/113.  Patient given solumedrol, pantoprazole, PO potassium. Patient seen by nephrology, started on NS for the hyponatremia. Patient admitted ICU for higher level of care.     While in [place] ICU, pt was treated for alcoholic hepatitis, hepatorenal syndrome, and acute renal failure. Pt was seen by nephrology, who recommended transfer to tertiary care center for initiation of CVVH. Hyponatremia was treated with NS and patient also received octreotide and albumin for HRS. Pt was also maintained on NaHCO3 drip for metabolic acidosis. Given hypervolemic state, albumin and further IVF and drips were discontinued. Pt was started on 32mg IV solumedrol qD for alcoholic hepatitis. Lactulose was titrated to 20mg TID. Spironolactone was not given due to ARF in s/o HRS. GI was consulted for alcoholic hepatitis as well as possible liver transplant eval, which also agreed to transfer to tertiary center for further evaluation. An ICU to ICU transfer was initiated and pt was transferred to [facility] MICU for further management.     Upon arrival to [facility] MICU, pt was seen by nephrology and was given DDAVP prior to Shiley placement. CVVHDF was initiated.       Allergies:  No Known Allergies      Home Medications:    Hospital Medications:  albumin human 25% IVPB 50 milliLiter(s) IV Intermittent every 6 hours  ALPRAZolam 0.5 milliGRAM(s) Oral every 12 hours PRN  chlorhexidine 4% Liquid 1 Application(s) Topical <User Schedule>  chlorhexidine 4% Liquid 1 Application(s) Topical <User Schedule>  CRRT Treatment    <Continuous>  dextrose 40% Gel 15 Gram(s) Oral once  dextrose 5%. 1000 milliLiter(s) IV Continuous <Continuous>  dextrose 50% Injectable 25 Gram(s) IV Push once  dextrose 50% Injectable 12.5 Gram(s) IV Push once  dextrose 50% Injectable 25 Gram(s) IV Push once  folic acid Injectable 1 milliGRAM(s) IV Push daily  glucagon  Injectable 1 milliGRAM(s) IntraMuscular once  midodrine. 10 milliGRAM(s) Oral three times a day  norepinephrine Infusion 0.05 MICROgram(s)/kG/Min IV Continuous <Continuous>  octreotide  Infusion 50 MICROgram(s)/Hr IV Continuous <Continuous>  piperacillin/tazobactam IVPB.. 3.375 Gram(s) IV Intermittent every 12 hours  PrismaSATE Dialysate BGK 4 / 2.5 5000 milliLiter(s) CRRT <Continuous>  PrismaSOL Filtration BGK 4 / 2.5 5000 milliLiter(s) CRRT <Continuous>  PrismaSOL Filtration BGK 4 / 2.5 5000 milliLiter(s) CRRT <Continuous>  sodium chloride 0.9% lock flush 10 milliLiter(s) IV Push every 1 hour PRN  thiamine IVPB 500 milliGRAM(s) IV Intermittent every 8 hours      PMHX/PSHX:  Anxiety    GERD (gastroesophageal reflux disease)    Constipation    Fatty liver    IBS (irritable bowel syndrome)    Liver cirrhosis    HLD (hyperlipidemia)    Claustrophobia    ETOH abuse    Lipoma    H/O colonoscopy    H/O endoscopy    Lipoma of back        Family history:  Family hx of colon cancer    FHx: breast cancer    FH: CAD (coronary artery disease)    Family history of stroke    Family history of Parkinsonism        Social History:     ROS:     General:  No weight loss, fevers, chills, night sweats, fatigue  Eyes:  No vision changes, no yellowing of eyes   ENT:  No throat pain, runny nose  CV:  No chest pain, palpitations  Resp:  No SOB, cough, wheezing  GI:  See HPI  :  No burning with urination, no hematuria   Muscle:  No muscle pain, weakness  Neuro:  No numbness/tingling, memory problems  Psych:  No fatigue, insomnia, mood problems  Heme:  No easy bruisability  Skin:  No rash, itching       PHYSICAL EXAM:     GENERAL:  Appears stated age, well-groomed, well-nourished, no distress  HEENT:  NC/AT,  conjunctivae clear and pink,  no JVD  CHEST:  Full & symmetric excursion, no increased effort, breath sounds clear  HEART:  Regular rhythm, S1, S2, no murmur/rub/S3/S4, no abdominal bruit, no edema  ABDOMEN:  Soft, non-tender, non-distended, normoactive bowel sounds,  no masses ,  EXTREMITIES:  no cyanosis,clubbing or edema  SKIN:  No rash/erythema/ecchymoses/petechiae/wounds/abscess/warm/dry  NEURO:  Alert, oriented    Vital Signs:  Vital Signs Last 24 Hrs  T(C): 36.4 (30 Aug 2021 04:00), Max: 37.1 (29 Aug 2021 18:25)  T(F): 97.5 (30 Aug 2021 04:00), Max: 98.7 (29 Aug 2021 18:25)  HR: 89 (30 Aug 2021 07:45) (64 - 94)  BP: 118/58 (30 Aug 2021 07:45) (71/40 - 142/84)  BP(mean): 85 (30 Aug 2021 07:45) (49 - 97)  RR: 28 (30 Aug 2021 07:45) (10 - 43)  SpO2: 98% (30 Aug 2021 07:45) (94% - 100%)  Daily Height in cm: 160.02 (29 Aug 2021 18:25)    Daily     LABS:                        8.2    6.57  )-----------( 98       ( 29 Aug 2021 21:45 )             22.2     08-30    125<L>  |  81<L>  |  114<H>  ----------------------------<  181<H>  3.4<L>   |  14<L>  |  9.87<H>    Ca    7.3<L>      30 Aug 2021 04:15  Phos  7.7       Mg     2.3         TPro  6.8  /  Alb  3.4  /  TBili  27.0<H>  /  DBili  x   /  AST  151<H>  /  ALT  86<H>  /  AlkPhos  248<H>      LIVER FUNCTIONS - ( 30 Aug 2021 04:15 )  Alb: 3.4 g/dL / Pro: 6.8 g/dL / ALK PHOS: 248 U/L / ALT: 86 U/L / AST: 151 U/L / GGT: x           PT/INR - ( 29 Aug 2021 21:01 )   PT: 19.8 sec;   INR: 1.69 ratio         PTT - ( 29 Aug 2021 21:01 )  PTT:40.1 sec  Urinalysis Basic - ( 30 Aug 2021 00:21 )    Color: Dark Yellow / Appearance: Turbid / S.018 / pH: x  Gluc: x / Ketone: Negative  / Bili: Moderate / Urobili: 2 mg/dL   Blood: x / Protein: 100 / Nitrite: Negative   Leuk Esterase: Large / RBC: 5 /hpf /  /HPF   Sq Epi: x / Non Sq Epi: 1 /hpf / Bacteria: Moderate      Amylase Serum--      Lipase serum--       [id number]      Imaging:             Chief Complaint:  Patient is a 56y old  Female who presents with a chief complaint of CRRT (30 Aug 2021 08:01)      HPI: 57 yo f pmhx ETOH abuse (last drink ~2 weeks ago), ETOH cirrhosis c/b ascites, splenomegaly, IBS who was transferred from [place] ICU . She initially presented to [place] ED from home after being sent in by her GI Dr. López for abnl labs.  Per patient over the last week she has noticed her sclera/skin to be bright orange, fatigued, lightheaded, nausea, poor appetite and abdominal bloating.  Patient went for labs as an outpatient , which were significant for elevated BUN/Cr and bili.  In [place] ED, patient found to be grossly jaundiced, labs significant for Na 114, BUN/Cr 146/16.3, serum co2 14, tbili 27.1, ast/alt 205/113.  Patient given solumedrol, pantoprazole, PO potassium. Patient seen by nephrology, started on NS for the hyponatremia. Patient admitted ICU for higher level of care.     While in [place] ICU, pt was treated for alcoholic hepatitis, hepatorenal syndrome, and acute renal failure. Pt was seen by nephrology, who recommended transfer to tertiary care center for initiation of CVVH. Hyponatremia was treated with NS and patient also received octreotide and albumin for HRS. Pt was also maintained on NaHCO3 drip for metabolic acidosis. Given hypervolemic state, albumin and further IVF and drips were discontinued. Pt was started on 32mg IV solumedrol qD for alcoholic hepatitis. Lactulose was titrated to 20mg TID. Spironolactone was not given due to ARF in s/o HRS. GI was consulted for alcoholic hepatitis as well as possible liver transplant eval, which also agreed to transfer to tertiary center for further evaluation. An ICU to ICU transfer was initiated and pt was transferred to [facility] MICU for further management.     Upon arrival to [facility] MICU, pt was seen by nephrology and was given DDAVP prior to Shiley placement. CVVHDF was initiated.       Allergies:  No Known Allergies      Home Medications:    Hospital Medications:  albumin human 25% IVPB 50 milliLiter(s) IV Intermittent every 6 hours  ALPRAZolam 0.5 milliGRAM(s) Oral every 12 hours PRN  chlorhexidine 4% Liquid 1 Application(s) Topical <User Schedule>  chlorhexidine 4% Liquid 1 Application(s) Topical <User Schedule>  CRRT Treatment    <Continuous>  dextrose 40% Gel 15 Gram(s) Oral once  dextrose 5%. 1000 milliLiter(s) IV Continuous <Continuous>  dextrose 50% Injectable 25 Gram(s) IV Push once  dextrose 50% Injectable 12.5 Gram(s) IV Push once  dextrose 50% Injectable 25 Gram(s) IV Push once  folic acid Injectable 1 milliGRAM(s) IV Push daily  glucagon  Injectable 1 milliGRAM(s) IntraMuscular once  midodrine. 10 milliGRAM(s) Oral three times a day  norepinephrine Infusion 0.05 MICROgram(s)/kG/Min IV Continuous <Continuous>  octreotide  Infusion 50 MICROgram(s)/Hr IV Continuous <Continuous>  piperacillin/tazobactam IVPB.. 3.375 Gram(s) IV Intermittent every 12 hours  PrismaSATE Dialysate BGK 4 / 2.5 5000 milliLiter(s) CRRT <Continuous>  PrismaSOL Filtration BGK 4 / 2.5 5000 milliLiter(s) CRRT <Continuous>  PrismaSOL Filtration BGK 4 / 2.5 5000 milliLiter(s) CRRT <Continuous>  sodium chloride 0.9% lock flush 10 milliLiter(s) IV Push every 1 hour PRN  thiamine IVPB 500 milliGRAM(s) IV Intermittent every 8 hours      PMHX/PSHX:  Anxiety    GERD (gastroesophageal reflux disease)    Constipation    Fatty liver    IBS (irritable bowel syndrome)    Liver cirrhosis    HLD (hyperlipidemia)    Claustrophobia    ETOH abuse    Lipoma    H/O colonoscopy    H/O endoscopy    Lipoma of back        Family history:  Family hx of colon cancer    FHx: breast cancer    FH: CAD (coronary artery disease)    Family history of stroke    Family history of Parkinsonism        Social History:     ROS: 14 point ROS negative unless otherwise stated in subjective        PHYSICAL EXAM:     GENERAL:  Appears stated age, +mildly confused, unkempt, no distress  HEENT:  NC/AT,  +scleral icterus  CHEST:  Full & symmetric excursion, no increased effort, breath sounds clear  HEART:  Regular rhythm, S1, S2, no murmur/rub/S3/S4  ABDOMEN:  Soft, non-tender, non-distended, normoactive bowel sounds  EXTREMITIES:  +trace BL LE edema  SKIN:  +jaundiced  NEURO:  Alert, orientedx1, +asterixis    Vital Signs:  Vital Signs Last 24 Hrs  T(C): 36.4 (30 Aug 2021 04:00), Max: 37.1 (29 Aug 2021 18:25)  T(F): 97.5 (30 Aug 2021 04:00), Max: 98.7 (29 Aug 2021 18:25)  HR: 89 (30 Aug 2021 07:45) (64 - 94)  BP: 118/58 (30 Aug 2021 07:45) (71/40 - 142/84)  BP(mean): 85 (30 Aug 2021 07:45) (49 - 97)  RR: 28 (30 Aug 2021 07:45) (10 - 43)  SpO2: 98% (30 Aug 2021 07:45) (94% - 100%)  Daily Height in cm: 160.02 (29 Aug 2021 18:25)    Daily     LABS:                        8.2    6.57  )-----------( 98       ( 29 Aug 2021 21:45 )             22.2     08-30    125<L>  |  81<L>  |  114<H>  ----------------------------<  181<H>  3.4<L>   |  14<L>  |  9.87<H>    Ca    7.3<L>      30 Aug 2021 04:15  Phos  7.7     0830  Mg     2.3     0830    TPro  6.8  /  Alb  3.4  /  TBili  27.0<H>  /  DBili  x   /  AST  151<H>  /  ALT  86<H>  /  AlkPhos  248<H>  30    LIVER FUNCTIONS - ( 30 Aug 2021 04:15 )  Alb: 3.4 g/dL / Pro: 6.8 g/dL / ALK PHOS: 248 U/L / ALT: 86 U/L / AST: 151 U/L / GGT: x           PT/INR - ( 29 Aug 2021 21:01 )   PT: 19.8 sec;   INR: 1.69 ratio         PTT - ( 29 Aug 2021 21:01 )  PTT:40.1 sec  Urinalysis Basic - ( 30 Aug 2021 00:21 )    Color: Dark Yellow / Appearance: Turbid / S.018 / pH: x  Gluc: x / Ketone: Negative  / Bili: Moderate / Urobili: 2 mg/dL   Blood: x / Protein: 100 / Nitrite: Negative   Leuk Esterase: Large / RBC: 5 /hpf /  /HPF   Sq Epi: x / Non Sq Epi: 1 /hpf / Bacteria: Moderate      Amylase Serum--      Lipase serum--       [id number]      Imaging:        EXAM:  US DPLX ABDOMEN                          EXAM:  US ABDOMEN COMPLETE                            PROCEDURE DATE:  2021          INTERPRETATION:  CLINICAL INFORMATION: Jaundice.    COMPARISON: None available.    TECHNIQUE: Sonography of the abdomen. Color and spectral Doppler evaluation of the hepatic vasculature was performed.    FINDINGS:    Liver: Nodular contour and coarsened echotexture.    Bile ducts: Normal caliber. Common bile duct measures 6 mm.    Gallbladder: Mildly distended with gallbladder wall measuring 0.4 cm, at upper limits of normal.    Pancreas: Poorly visualized.    Spleen: 14.8 cm. Splenomegaly    Right kidney: 11.4 cm. No hydronephrosis.    Left kidney: 10.5 cm.  No hydronephrosis.    Ascites: None.    Aortaand IVC: Visualized portions are within normal limits.    Main portal vein: No flow is visualized, probably occluded.    Right portal vein: Patent with hepatopedal flow.    Left portal vein: No flow is visualized, probably occluded.    Hepatic artery: Patent with a normal waveform. Peak systolic velocity is 149 cm/s.    Hepatic veins: Patent with normal flow hemodynamics and spectral waveforms.    Splenic vein: Patent  with hepatopedal flow.    IMPRESSION:  The main and left portal vein do not demonstrate flow, probably due to occlusion.    Right portal vein is patent with normal directional flow.  Patent hepatic veins and hepatic artery.    Cirrhosis and splenomegaly.

## 2021-08-30 NOTE — PROGRESS NOTE ADULT - ASSESSMENT
56 y.o. F with PMH of ETOH misuse, ETOH cirrhosis, HLD, IBS, anxiety, presents with decompensated alcoholic cirrhosis c/b HRS, likely contributing to acute renal failure and hyponatremia, now requiring CRRT, transferred to [facility] MICU for further management.     Neuro  A&O x 3 at this time    # ETOH Misuse  - Continue to monitor for w/d symptoms; pt reported last drink was 2.5 wks ago  - SW consult & cessation counseling   - Start thiamine and folate     # Anxiety  - C/w Xanax PRN   - See ISTOP     Cardiovascular  #Hypotension - on levophed  - Monitor BP while on CRRT, may need pressors (ideally levo given HRS)  - Maintain MAP goal >80 given HRS as per hepatology     Pulmonary  - Normal O2 Saturation on room air, monitor for volume overload in s/o ARF and hypervolemia     GI  # Hepatorenal syndrome   - See Renal     # ETOH Cirrhosis   - Discontinue solumedrol 40mg IV qD and f/u infectious work up as per discussion with hepatology   - Hold off on NAC as may worsen hyponatremia   - Maddrey score: 52.4 indicative of poor prognosis  - MELD Score: 38 --?65-66% risk of 90 day mortality   - Hepatology consult in am for transplant eval   - C/w lactulose 20mg TID and titrate to 3BM daily   - Pt anuric now, unclear if spironolactone will be helpful  - No evidence of ascites on U/S abd   - Trend liver labs daily CMP     # Hep C (AB+?)   - F/u viral load  - F/u acute hepatitis panel     Renal  # Hepatorenal syndrome  - Suspect acute renal failure 2/2 HRS in s/o alcoholic hepatitis and cirrhosis   - C/w octreotide, albumin gtt  - Start CRRT as per nephrology  - Baseline Cr 2.5  - Monitor Cr and avoid nephrotoxic agents  - Renally dose meds   - MAP goal 80+  - Monitor I/Os     # Hyponatremia  - Euvolemic on exam; possibly 2/2 ARF and or poor oral intake in s/o ETOH use   - F/u urine lytes and serum osm   - Na 114-->118 in 24 hours   - S/p NS boluses while at [place]  - Trend BMP Mg Phos q4   - Expect Na to improve as volume status improves with CRRT   - Avoid rapid correction, goal 6-8 mEq in 24 hrs     # Metabolic acidosis   - Likely 2/2 renal loss of bicarb and inability to excrete H+ in s/o ARF   - D/c NaHCO3 drip as per renal   - Expect improvement with CRRT   - Monitor BMP q4     ID  # SBP PPx   - Mild ascites per POCUS   - Low suspicion for SBP, defer CTX   - Afebrile without leukocytosis so far   - F/u infectious work up including UA, CXR     Heme  # Macrocytic anemia & Thrombocytopenia   - Likely in s/o poor nutritional status from ETOH use  - Check B12 folate iron studies  - Folate thiamine supplement    # Coagulopathy   - In s/o decompensated alcoholic cirrhosis and ARF   - Give DDAVP for likely plt dysfunction   - Trend daily PT INR PTT     # Portal vein occlusion  - U/S Abd doppler from [facility] revealed main and left portal vein occlusions  - Cirrhosis induced coagulopathy, start AC in the am after catheter access is established if H/H is stable     Ethics  # FULL code   -  is surrogate decision maker  56 y.o. F with PMH of ETOH misuse, ETOH cirrhosis, HLD, IBS, anxiety, presents with decompensated alcoholic cirrhosis c/b HRS, likely contributing to acute renal failure and hyponatremia, now requiring CRRT, transferred to [facility] MICU for further management.     Neuro  A&O x 3 at this time    #Hepatic encephalopathy    # ETOH Misuse  - Continue to monitor for w/d symptoms; pt reported last drink was 2.5 wks ago  - SW consult & cessation counseling   - Start thiamine and folate     # Anxiety  - C/w Xanax PRN   - See ISTOP     Cardiovascular  #Hypotension - on levophed  - Monitor BP while on CRRT, may need pressors (ideally levo given HRS)  - Maintain MAP goal >80 given HRS as per hepatology     Pulmonary  - Normal O2 Saturation on room air, monitor for volume overload in s/o ARF and hypervolemia     GI  # Hepatorenal syndrome   - See Renal     # ETOH Cirrhosis   - Discontinue solumedrol 40mg IV qD and f/u infectious work up as per discussion with hepatology   - Hold off on NAC as may worsen hyponatremia   - Maddrey score: 52.4 indicative of poor prognosis  - MELD Score: 38 --?65-66% risk of 90 day mortality   - Hepatology consult in am for transplant eval   - C/w lactulose 20mg TID and titrate to 3BM daily   - Pt anuric now, unclear if spironolactone will be helpful  - No evidence of ascites on U/S abd   - Trend liver labs daily CMP     # Hep C (AB+?)   - F/u viral load  - F/u acute hepatitis panel     Renal  # Hepatorenal syndrome  - Suspect acute renal failure 2/2 HRS in s/o alcoholic hepatitis and cirrhosis   - C/w octreotide, albumin gtt  - Start CRRT as per nephrology  - Baseline Cr 2.5  - Monitor Cr and avoid nephrotoxic agents  - Renally dose meds   - MAP goal 80+  - Monitor I/Os     # Hyponatremia  - Euvolemic on exam; possibly 2/2 ARF and or poor oral intake in s/o ETOH use   - F/u urine lytes and serum osm   - Na 114-->118 in 24 hours   - S/p NS boluses while at [place]  - Trend BMP Mg Phos q4   - Expect Na to improve as volume status improves with CRRT   - Avoid rapid correction, goal 6-8 mEq in 24 hrs     # Metabolic acidosis   - Likely 2/2 renal loss of bicarb and inability to excrete H+ in s/o ARF   - D/c NaHCO3 drip as per renal   - Expect improvement with CRRT   - Monitor BMP q4     ID  # SBP PPx   - Mild ascites per POCUS   - Low suspicion for SBP, defer CTX   - Afebrile without leukocytosis so far   - F/u infectious work up including UA, CXR     Heme  # Macrocytic anemia & Thrombocytopenia   - Likely in s/o poor nutritional status from ETOH use  - Check B12 folate iron studies  - Folate thiamine supplement    # Coagulopathy   - In s/o decompensated alcoholic cirrhosis and ARF   - Give DDAVP for likely plt dysfunction   - Trend daily PT INR PTT     # Portal vein occlusion  - U/S Abd doppler from [facility] revealed main and left portal vein occlusions  - Cirrhosis induced coagulopathy, start AC in the am after catheter access is established if H/H is stable     Ethics  # FULL code   -  is surrogate decision maker  56 y.o. F with PMH of ETOH misuse, ETOH cirrhosis, HLD, IBS, anxiety, presents with decompensated alcoholic cirrhosis c/b HRS, likely contributing to acute renal failure and hyponatremia, now requiring CRRT, transferred to [facility] MICU for further management.     Neuro  A&O x 3 at this time    #Hepatic encephalopathy  -lactulose 20q8  -rifaximin  -titrate to 4-5BM daily    # ETOH Misuse  - Continue to monitor for w/d symptoms; pt reported last drink was 2.5 wks ago  - SW consult & cessation counseling   - c/w thiamine and folate     # Anxiety  - C/w Xanax PRN   - See ISTOP     Cardiovascular  #Hypotension - on levophed  - Monitor BP while on CRRT, ma need pressors (ideally levo given HRS)  - Maintain MAP goal >80 given HRS as per hepatology     Pulmonary  - Normal O2 Saturation on room air, monitor for volume overload in s/o ARF and hypervolemia     GI  # Hepatorenal syndrome   - See Renal     # ETOH Cirrhosis   - Discontinue solumedrol 40mg IV qD and f/u infectious work up as per discussion with hepatology   - Hold off on NAC as may worsen hyponatremia   - Maddrey score: 52.4 indicative of poor prognosis  - MELD Score: 38 --?65-66% risk of 90 day mortality   - Hepatology consult in am for transplant eval   - C/w lactulose 20mg TID and titrate to 3BM daily   - Pt anuric now, unclear if spironolactone will be helpful  - No evidence of ascites on U/S abd   - Trend liver labs daily CMP     # Hep C (AB+?)   - F/u viral load  - F/u acute hepatitis panel     Renal  # Hepatorenal syndrome  - Suspect acute renal failure 2/2 HRS in s/o alcoholic hepatitis and cirrhosis   - C/w octreotide, albumin gtt  - Start CRRT as per nephrology  - Baseline Cr 2.5  - Monitor Cr and avoid nephrotoxic agents  - Renally dose meds   - MAP goal 80+  - Monitor I/Os     # Hyponatremia  - Euvolemic on exam; possibly 2/2 ARF and or poor oral intake in s/o ETOH use   - F/u urine lytes and serum osm   - Na 114-->118 in 24 hours   - S/p NS boluses while at [place]  - Trend BMP Mg Phos q4   - Expect Na to improve as volume status improves with CRRT   - Avoid rapid correction, goal 6-8 mEq in 24 hrs     # Metabolic acidosis   - Likely 2/2 renal loss of bicarb and inability to excrete H+ in s/o ARF   - D/c NaHCO3 drip as per renal   - Expect improvement with CRRT   - Monitor BMP q4     ID  # SBP PPx   - Mild ascites per POCUS   - Low suspicion for SBP, defer CTX   - Afebrile without leukocytosis so far   - F/u infectious work up including UA, CXR     Heme  # Macrocytic anemia & Thrombocytopenia   - Likely in s/o poor nutritional status from ETOH use  - Check B12 folate iron studies  - Folate thiamine supplement    # Coagulopathy   - In s/o decompensated alcoholic cirrhosis and ARF   - Give DDAVP for likely plt dysfunction   - Trend daily PT INR PTT     # Portal vein occlusion  - U/S Abd doppler from [facility] revealed main and left portal vein occlusions  - Cirrhosis induced coagulopathy, start AC in the am after catheter access is established if H/H is stable     Ethics  # FULL code   -  is surrogate decision maker  56 y.o. F with PMH of ETOH misuse, ETOH cirrhosis, HLD, IBS, anxiety, presents with decompensated alcoholic cirrhosis c/b HRS, likely contributing to acute renal failure and hyponatremia, now requiring CRRT, transferred to [facility] MICU for further management.     Neuro  A&O x 3 at this time    #Hepatic encephalopathy  -lactulose 20q8  -rifaximin  -titrate to 4-5BM daily    # ETOH Misuse  - Continue to monitor for w/d symptoms; pt reported last drink was 2.5 wks ago  - SW consult & cessation counseling   - c/w thiamine and folate     # Anxiety  - C/w Xanax PRN   - See ISTOP     Cardiovascular  #Hypotension - on levophed  - Monitor BP while on CRRT, ma need pressors (ideally levo given HRS)  - Maintain MAP goal >80 given HRS as per hepatology     Pulmonary  - Normal O2 Saturation on room air, monitor for volume overload in s/o ARF and hypervolemia     GI  # Hepatorenal syndrome   - See Renal     # ETOH Cirrhosis   - Discontinue solumedrol 40mg IV qD and f/u infectious work up as per discussion with hepatology   - Hold off on NAC as may worsen hyponatremia   - Maddrey score: 52.4 indicative of poor prognosis  - MELD Score: 38 --?65-66% risk of 90 day mortality   - C/w lactulose 20mg TID and titrate to 3BM daily   - Pt anuric now, unclear if spironolactone will be helpful  - No evidence of ascites on U/S abd   - Trend liver labs daily CMP   -  8/30 start NAC    # Hep C (AB+?)   - F/u viral load  - Hep B negative  -Hep C weakly reactive    #R/o portal vein thrombosis  -hep gtt for now   [ ] triple phase CT A/P to r/o portal vein thrombosis    Renal  # Hepatorenal syndrome  - Suspect acute renal failure 2/2 HRS in s/o alcoholic hepatitis and cirrhosis   - C/w octreotide, albumin gtt  - On CRRT  - Baseline Cr 2.5  - Monitor Cr and avoid nephrotoxic agents  - Renally dose meds   - MAP goal 80+  - Monitor I/Os     # Hyponatremia  - Euvolemic on exam; possibly 2/2 ARF and or poor oral intake in s/o ETOH use   - F/u urine lytes and serum osm   - Na 114-->118 in 24 hours   - S/p NS boluses while at [place]  - Trend BMP Mg Phos q4   - Expect Na to improve as volume status improves with CRRT   - Avoid rapid correction, goal 6-8 mEq in 24 hrs   -Increase D5 from 100cc/hr to 150cc/hr    # Metabolic acidosis   - Likely 2/2 renal loss of bicarb and inability to excrete H+ in s/o ARF   - D/c NaHCO3 drip as per renal   - Expect improvement with CRRT   - Monitor BMP q4     ID  # SBP PPx   - Mild ascites per POCUS   - Low suspicion for SBP, defer CTX   - Afebrile without leukocytosis so far   - F/u infectious work up including UA, CXR   monitor off ax    Heme  # Macrocytic anemia & Thrombocytopenia   - Likely in s/o poor nutritional status from ETOH use  - Check B12 folate iron studies  - Folate thiamine supplement    # Coagulopathy   - In s/o decompensated alcoholic cirrhosis and ARF   - Give DDAVP for likely plt dysfunction   - Trend daily PT INR PTT     # Portal vein occlusion  - U/S Abd doppler from [facility] revealed main and left portal vein occlusions  - Cirrhosis induced coagulopathy, start AC in the am after catheter access is established if H/H is stable     Ethics  # FULL code   -  is surrogate decision maker  56 y.o. F with PMH of ETOH misuse, ETOH cirrhosis, HLD, IBS, anxiety, presents with decompensated alcoholic cirrhosis c/b HRS, likely contributing to acute renal failure and hyponatremia, now requiring CRRT, transferred to [facility] MICU for further management.     Neuro  A&O x 3 at this time    #Hepatic encephalopathy  -lactulose 20q8  -rifaximin  -titrate to 4-5BM daily    # ETOH Misuse  - Continue to monitor for w/d symptoms; pt reported last drink was 2.5 wks ago  - SW consult & cessation counseling   - c/w thiamine and folate     # Anxiety  - C/w Xanax PRN   - See ISTOP     Cardiovascular  #Hypotension - on levophed  - Monitor BP while on CRRT, ma need pressors (ideally levo given HRS)  - Maintain MAP goal >80 given HRS as per hepatology     Pulmonary  - Normal O2 Saturation on room air, monitor for volume overload in s/o ARF and hypervolemia     GI  # Hepatorenal syndrome   - See Renal     # ETOH Cirrhosis   - Discontinue solumedrol 40mg IV qD and f/u infectious work up as per discussion with hepatology   - Hold off on NAC as may worsen hyponatremia   - Maddrey score: 52.4 indicative of poor prognosis  - MELD Score: 38 --?65-66% risk of 90 day mortality   - C/w lactulose 20mg TID and titrate to 3BM daily   - Pt anuric now, unclear if spironolactone will be helpful  - No evidence of ascites on U/S abd   - Trend liver labs daily CMP   -  8/30 start NAC    Endoscopy in 2019: no varices, stomach with congestion c/w gastritis vs portal gastropathy    # Hep C (AB+?)   - F/u viral load  - Hep B negative  -Hep C weakly reactive    #R/o portal vein thrombosis  -hep gtt for now   [ ] triple phase CT A/P to r/o portal vein thrombosis    Renal  # Hepatorenal syndrome  - Suspect acute renal failure 2/2 HRS in s/o alcoholic hepatitis and cirrhosis   - C/w octreotide, albumin gtt  - On CRRT  - Baseline Cr 2.5  - Monitor Cr and avoid nephrotoxic agents  - Renally dose meds   - MAP goal 80+  - Monitor I/Os     # Hyponatremia  - Euvolemic on exam; possibly 2/2 ARF and or poor oral intake in s/o ETOH use   - F/u urine lytes and serum osm   - Na 114-->118 in 24 hours   - S/p NS boluses while at [place]  - Trend BMP Mg Phos q4   - Expect Na to improve as volume status improves with CRRT   - Avoid rapid correction, goal 6-8 mEq in 24 hrs   -Increase D5 from 100cc/hr to 150cc/hr    # Metabolic acidosis   - Likely 2/2 renal loss of bicarb and inability to excrete H+ in s/o ARF   - D/c NaHCO3 drip as per renal   - Expect improvement with CRRT   - Monitor BMP q4     ID  # SBP PPx   - Mild ascites per POCUS   - Low suspicion for SBP, defer CTX   - Afebrile without leukocytosis so far   - F/u infectious work up including UA, CXR   monitor off ax    Heme  # Macrocytic anemia & Thrombocytopenia   - Likely in s/o poor nutritional status from ETOH use  - Check B12 folate iron studies  - Folate thiamine supplement    # Coagulopathy   - In s/o decompensated alcoholic cirrhosis and ARF   - Give DDAVP for likely plt dysfunction   - Trend daily PT INR PTT     # Portal vein occlusion  - U/S Abd doppler from [facility] revealed main and left portal vein occlusions  - Cirrhosis induced coagulopathy, start AC in the am after catheter access is established if H/H is stable     Ethics  # FULL code   -  is surrogate decision maker

## 2021-08-30 NOTE — PROGRESS NOTE ADULT - ATTENDING COMMENTS
Patient seen and examined. Patient critically ill requiring frequent bedside visits with therapy change. Patient is a 56F EtOH abuse, cirrhosis/portal HTN, IBS, and HLD who is transferred from [facility] for liver and renal dysfunction. She was admitted to MICU for concern of hepatorenal syndrome and initiation of CRRT.    1. Shock state - in the setting of hepatorenal syndrome will aim for a MAP > 75 with Levophed assistance. No overt signs of infection.  2. Acute renal Failure - likely hepatorenal now on CRRT. Will continue net even. Nephrology followup. Serial BMP  - Hyponatremia - will add D5 to prevent overcorrection.   3. Acute Liver failure - patient with elevated bilirubin in the setting of known EtOh use. Hepatology evaluation. Will start NAC x 5 days. Will hold off steroids at this time per d/w Hepatology.  - US with possible portal vein clots - will start Hep gtt and check CT abdomen triple phase - discussed risks of contrast with   - HCV weakly positive  - Octreotide and Albumin  - Asterixis - will start Lactulose and Rifaximin  - will need prior EGD reports   4. Pulmonary - stable and on RA. RVP with evidence of adenovirus  5. Infectious Disease - followup cultures. Monitor off antibiotics for now. Minimal ascites on initial POCUS    Long term prognosis guarded. Nephrology and Hepatology followup.

## 2021-08-30 NOTE — CONSULT NOTE ADULT - ASSESSMENT
55 yo f pmhx ETOH abuse (last drink ~2 weeks ago), ETOH cirrhosis c/b ascites, splenomegaly, IBS who was transferred from [place] ICU 8/29. She initially presented to [place] ED from home after being sent in by her GI Dr. López for abnl labs.  Per patient over the last week she has noticed her sclera/skin to be bright orange, fatigued, lightheaded, nausea, poor appetite and abdominal bloating. In [place] ED, patient found to be grossly jaundiced, labs significant for Na 114, BUN/Cr 146/16.3, serum co2 14, tbili 27.1, ast/alt 205/113. While in [place] ICU, pt was treated for alcoholic hepatitis s/p solumedrol, hepatorenal syndrome s/p octreotide, albumin, and acute renal failure. Pt was seen by nephrology, who recommended transfer to tertiary care center for initiation of CVVH. Hyponatremia was treated with NS. Pt was also maintained on NaHCO3 drip for metabolic acidosis. Lactulose was titrated to 20mg TID. Spironolactone was not given due to ARF in s/o HRS. GI was consulted for alcoholic hepatitis as well as possible liver transplant eval, which also agreed to transfer to tertiary center for further evaluation. An ICU to ICU transfer was initiated and pt was transferred to [facility] 8/29 and was started on CVVHDF. Hepatology was consulted due to decompensated ETOH cirrhosis.     Impression:  #Decompensated ETOH cirrhosis- MELDNa 39 (on CVVHDF); MDF 58.5 (poor prognosis)  -Ascites: no ascites on US abd 8/28  -Varices: Last EGD 04/2019 w/o EV  -HCC: no focal lesions seen on US abd +doppler 8/28  -HE: oriented to person on exam, +asterixis on exam  #UTI- UA with +WBC>150 and +LE: pending urine cx    Recommendations:  - check Hep B surface Ag, Hep B core Ab, Hep A IgM, Hep C Ab, Hep E IgM  - check serum FARHANA, smooth muscle Ab, anti-LKM-1 Ab, alpha-1 antitrypsin, ferritin, ceruloplasmin, copper, immunoglobulin panel (IgG, IgA, IgM)  - rifaxamin 550 mg BID, start lactulose 10 mg TID; titrate to goal 3-4 BM/day  - f/u bl cx x2, U cx; can consider starting empiric abx for UTI while awaiting urine cx  - continue hep gtt due to suspected PVT seen on US doppler this admission  - recommend CT A/P with cont triple phase to evaluate for PVTs  - obtain TTE  - continue albumin 25% 50 mL q6h; can continue octreotide gtt for now  - consider consult IR for diagnostic paracentesis, though no ascites on abdominal US  - trend INR, platelet and CMP daily  - Will call pt's OP GI Dr. Williams López to obtain additional information    We will continue to follow.    Berenice Rincon MD  GI Fellow, PGY-4  Available via [employer] Teams    NON-URGENT CONSULTS:  Please email [email or web address]  AT NIGHT AND ON WEEKENDS:  Contact on-call GI fellow via answering service ([phone]) from 5pm-8am and on weekends/holidays  MONDAY-FRIDAY 8AM-5PM:  Pager# [phone] ([facility] or [phone] ([facility])  GI Phone# [phone] ([facility])       57 yo f pmhx ETOH abuse (last drink ~2 weeks ago), ETOH cirrhosis c/b ascites, splenomegaly, IBS who was transferred from [place] ICU 8/29. She initially presented to [place] ED from home after being sent in by her GI Dr. López for abnl labs.  Per patient over the last week she has noticed her sclera/skin to be bright orange, fatigued, lightheaded, nausea, poor appetite and abdominal bloating. In [place] ED, patient found to be grossly jaundiced, labs significant for Na 114, BUN/Cr 146/16.3, serum co2 14, tbili 27.1, ast/alt 205/113. While in [place] ICU, pt was treated for alcoholic hepatitis s/p solumedrol, hepatorenal syndrome s/p octreotide, albumin, and acute renal failure. Pt was seen by nephrology, who recommended transfer to tertiary care center for initiation of CVVH. Hyponatremia was treated with NS. Pt was also maintained on NaHCO3 drip for metabolic acidosis. Lactulose was titrated to 20mg TID. Spironolactone was not given due to ARF in s/o HRS. GI was consulted for alcoholic hepatitis as well as possible liver transplant eval, which also agreed to transfer to tertiary center for further evaluation. An ICU to ICU transfer was initiated and pt was transferred to [facility] 8/29 and was started on CVVHDF. Hepatology was consulted due to decompensated ETOH cirrhosis.     Impression:  #Decompensated ETOH cirrhosis- MELDNa 39 (on CVVHDF); MDF 58.5 (poor prognosis)  -Ascites: no ascites on US abd 8/28  -Varices: Last EGD 04/2019 w/o EV  -HCC: no focal lesions seen on US abd +doppler 8/28  -HE: oriented to person on exam, +asterixis on exam  #UTI- UA with +WBC>150 and +LE: pending urine cx    Recommendations:  - check Hep B surface Ag, Hep B core Ab, Hep A IgM, Hep C Ab, Hep E IgM  - check serum FARHANA, smooth muscle Ab, anti-LKM-1 Ab, alpha-1 antitrypsin, ferritin, ceruloplasmin, copper, immunoglobulin panel (IgG, IgA, IgM)  - rifaxamin 550 mg BID, start lactulose 10 mg TID; titrate to goal 3-4 BM/day  - f/u bl cx x2, U cx; can consider starting empiric abx for UTI while awaiting urine cx  - continue hep gtt due to suspected PVT seen on US doppler this admission  - recommend CT A/P with cont triple phase to evaluate for PVTs  - obtain TTE  - continue albumin 25% 50 mL q6h; can continue octreotide gtt for now  - consider consult IR for diagnostic paracentesis, though no ascites on abdominal US  - trend INR, platelet and CMP daily  - Will call pt's OP GI Dr. Williams López to obtain additional information    We will continue to follow.    Berenice Rincon MD  GI Fellow, PGY-4  Available via [employer] Teams    NON-URGENT CONSULTS:  Please email [email or web address]  AT NIGHT AND ON WEEKENDS:  Contact on-call GI fellow via answering service ([phone]) from 5pm-8am and on weekends/holidays  MONDAY-FRIDAY 8AM-5PM:  Pager# [phone] ([facility]) or [phone] ([facility])  GI Phone# [phone] ([facility])

## 2021-08-30 NOTE — PROGRESS NOTE ADULT - ATTENDING COMMENTS
JULIANA ATN  CRRT  Continue current prescription  Add D5W peripherally to avoid overcorrection  No UF for now Keep net even   Hepatology consulted    Nupur Carreno MD  Off: [phone]  Cell: [phone]

## 2021-08-30 NOTE — PROGRESS NOTE ADULT - SUBJECTIVE AND OBJECTIVE BOX
[facility] DIVISION OF KIDNEY DISEASES AND HYPERTENSION -- FOLLOW UP NOTE  --------------------------------------------------------------------------------  Chief Complaint:    24 hour events/subjective:    seen and examined this morning   patient reported feeling better today   on CRRT      PAST HISTORY  --------------------------------------------------------------------------------  No significant changes to PMH, PSH, FHx, SHx, unless otherwise noted    ALLERGIES & MEDICATIONS  --------------------------------------------------------------------------------  Allergies    No Known Allergies    Intolerances      Standing Inpatient Medications  acetylcysteine IVPB 12 Gram(s) IV Intermittent once  acetylcysteine IVPB 4.1 Gram(s) IV Intermittent once  acetylcysteine IVPB 12 Gram(s) IV Intermittent <User Schedule>  albumin human 25% IVPB 50 milliLiter(s) IV Intermittent every 6 hours  chlorhexidine 4% Liquid 1 Application(s) Topical <User Schedule>  chlorhexidine 4% Liquid 1 Application(s) Topical <User Schedule>  CRRT Treatment    <Continuous>  dextrose 40% Gel 15 Gram(s) Oral once  dextrose 5%. 1000 milliLiter(s) IV Continuous <Continuous>  dextrose 50% Injectable 25 Gram(s) IV Push once  dextrose 50% Injectable 12.5 Gram(s) IV Push once  dextrose 50% Injectable 25 Gram(s) IV Push once  folic acid Injectable 1 milliGRAM(s) IV Push daily  glucagon  Injectable 1 milliGRAM(s) IntraMuscular once  heparin  Infusion.  Unit(s)/Hr IV Continuous <Continuous>  lactulose Syrup 20 Gram(s) Oral every 8 hours  norepinephrine Infusion 0.05 MICROgram(s)/kG/Min IV Continuous <Continuous>  octreotide  Infusion 50 MICROgram(s)/Hr IV Continuous <Continuous>  potassium chloride   Solution 20 milliEquivalent(s) Oral once  PrismaSATE Dialysate BGK 4 / 2.5 5000 milliLiter(s) CRRT <Continuous>  PrismaSOL Filtration BGK 4 / 2.5 5000 milliLiter(s) CRRT <Continuous>  PrismaSOL Filtration BGK 4 / 2.5 5000 milliLiter(s) CRRT <Continuous>  rifAXIMin 550 milliGRAM(s) Oral two times a day  thiamine IVPB 500 milliGRAM(s) IV Intermittent every 8 hours    PRN Inpatient Medications  ALPRAZolam 0.5 milliGRAM(s) Oral every 12 hours PRN  heparin   Injectable 6500 Unit(s) IV Push every 6 hours PRN  heparin   Injectable 3000 Unit(s) IV Push every 6 hours PRN  sodium chloride 0.9% lock flush 10 milliLiter(s) IV Push every 1 hour PRN      REVIEW OF SYSTEMS  All other systems were reviewed and are negative, except as noted.    VITALS/PHYSICAL EXAM  --------------------------------------------------------------------------------  T(C): 36.3 (08-30-21 @ 08:00), Max: 37.1 (08-29-21 @ 18:25)  HR: 86 (08-30-21 @ 11:00) (64 - 94)  BP: 114/56 (08-30-21 @ 11:00) (71/40 - 142/84)  RR: 25 (08-30-21 @ 11:00) (11 - 43)  SpO2: 97% (08-30-21 @ 11:00) (94% - 100%)  Wt(kg): --  Height (cm): 160 (08-29-21 @ 18:25)  Weight (kg): 81 (08-29-21 @ 18:25)  BMI (kg/m2): 31.6 (08-29-21 @ 18:25)  BSA (m2): 1.84 (08-29-21 @ 18:25)      08-29-21 @ 07:01  -  08-30-21 @ 07:00  --------------------------------------------------------  IN: 1384.2 mL / OUT: 901 mL / NET: 483.2 mL    08-30-21 @ 07:01  -  08-30-21 @ 11:13  --------------------------------------------------------  IN: 522.3 mL / OUT: 398 mL / NET: 124.3 mL        Physical Exam:  	Gen: NAD  	HEENT: MMM  	Pulm: CTA B/L  	CV: S1S2  	Abd: Soft, +BS   	Ext: No LE edema B/L  	Neuro: Awake  	Skin: Warm and dry, jaundiced  	Vascular access: R IJ      LABS/STUDIES  --------------------------------------------------------------------------------              8.2    6.57  >-----------<  98       [08-29-21 @ 21:45]              22.2     126  |  85  |  101  ----------------------------<  199      [08-30-21 @ 08:16]  3.1   |  15  |  8.21        Ca     7.6     [08-30-21 @ 08:16]      Mg     2.4     [08-30-21 @ 08:16]      Phos  6.2     [08-30-21 @ 08:16]    TPro  6.8  /  Alb  3.4  /  TBili  27.0  /  DBili  x   /  AST  151  /  ALT  86  /  AlkPhos  248  [08-30-21 @ 04:15]    PT/INR: PT 19.8 , INR 1.69       [08-29-21 @ 21:01]  PTT: 40.1       [08-29-21 @ 21:01]    Serum Osmolality 317      [08-30-21 @ 04:16]    Creatinine Trend:  SCr 8.21 [08-30 @ 08:16]  SCr 9.87 [08-30 @ 04:15]  SCr 13.95 [08-29 @ 21:00]  SCr 15.70 [08-29 @ 11:59]  SCr 16.20 [08-29 @ 06:37]    Urinalysis - [08-30-21 @ 00:21]      Color Dark Yellow / Appearance Turbid / SG 1.018 / pH 5.5      Gluc Negative / Ketone Negative  / Bili Moderate / Urobili 2 mg/dL       Blood Large / Protein 100 / Leuk Est Large / Nitrite Negative      RBC 5 /  / Hyaline 9 / Gran  / Sq Epi  / Non Sq Epi 1 / Bacteria Moderate    Urine Sodium 20      [08-30-21 @ 00:22]  Urine Potassium 23      [08-30-21 @ 00:22]  Urine Osmolality 340      [08-30-21 @ 00:22]    Iron 119, TIBC 158, %sat 75      [08-29-21 @ 23:03]  TSH 1.38      [08-29-21 @ 23:03]    HCV 1.21, Weakly Reactive Hepatitis C AB  S/CO Ratio                        Interpretation  < 1.00                                   Non-Reactive  1.00 - 4.99                         Weakly-Reactive  >= 5.00                                Reactive  Non-Reactive: A person with a non-reactive HCV antibody result is  considered uninfected.  No further action is needed unless recent  infection is suspected.  In these cases, consider repeat testing later to  detect seroconversion..  Weakly-Reactive: HCV antibody test is abnormal, HCV RNA Qualitative test  will follow.  Reactive: HCV antibody test is abnormal, HCV RNA Qualitative test will  follow.  Note: HCV antibody testing is performed on the [employer] system.      [08-29-21 @ 06:37]

## 2021-08-30 NOTE — CONSULT NOTE ADULT - ATTENDING COMMENTS
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
55 yo F w/ history active alcohol use disorder (last drink about 2 weeks ago), known ETOH cirrhosis c/b ascites p/w [facility] with worsening jaundice and renal failure requiring renal replacement therapy transferred here further care.    She is currently in the ICU and critically ill.   + asterixis, AAO x 2-3. Needs aggressive lactulose + rifaximin.  CRRT per ICU team but suspect HRS.  Portal veins may be out on ultrasound, recommend CT triple phase and starting heparin drip. PVT could be source of decompensation vs active alcohol use.  She has continued to drink despite knowledge of her liver disease for several years and appears to have a lack of insight. She is not a transplant candidate currently and will need prolonged sobriety and attendance of a relapse prevention program.  Will try to get collaborative information from her GI physician Dr. Williams López.

## 2021-08-31 NOTE — DIETITIAN INITIAL EVALUATION ADULT. - ENTERAL
Continue c Nepro at 40ml/hr x 24 hours as tolerated. May consider switching to Vital AF c a goal rate of 65ml/hr x18 hours (1170ml total volume, 1404cal, 88 Gm Prot; ~17cal/kg and ~1.1 Gm Prot/kg based on wt of 81kg).

## 2021-08-31 NOTE — DIETITIAN INITIAL EVALUATION ADULT. - REASON FOR ADMISSION
CRRT; transferred from [facility]; pt c hepatorenal syndrome, JULIANA in setting of severe liver disease. Noted pt c ETOH cirrhosis and ascites.

## 2021-08-31 NOTE — PROGRESS NOTE ADULT - SUBJECTIVE AND OBJECTIVE BOX
MICU PROGRESS NOTE    INTERVAL HPI/OVERNIGHT EVENTS:    SUBJECTIVE:     OBJECTIVE:    VITAL SIGNS:  ICU Vital Signs Last 24 Hrs  T(C): 36.4 (31 Aug 2021 08:00), Max: 36.4 (31 Aug 2021 08:00)  T(F): 97.6 (31 Aug 2021 08:00), Max: 97.6 (31 Aug 2021 08:00)  HR: 79 (31 Aug 2021 07:45) (67 - 98)  BP: 120/57 (31 Aug 2021 07:45) (78/43 - 137/60)  BP(mean): 81 (31 Aug 2021 07:45) (55 - 92)  ABP: --  ABP(mean): --  RR: 11 (31 Aug 2021 07:45) (10 - 31)  SpO2: 96% (31 Aug 2021 07:45) (91% - 100%)      VENTS:      I&O:    - @ 07:01  -  - @ 07:00  --------------------------------------------------------  IN: 5615.7 mL / OUT: 3696 mL / NET: 1919.7 mL        PHYSICAL EXAM:  GENERAL: NAD, conversant  CHEST/LUNG: Clear to auscultation bilaterally; No crackles, rhonchi, wheezing, or rubs  HEART: Regular rate and rhythm; No murmurs, rubs, or gallops  ABDOMEN: Soft, Nontender, Nondistended; Bowel sounds present  EXTREMITIES:  2+ Peripheral Pulses, No clubbing, cyanosis, or edema  SKIN: No rashes or lesions  NERVOUS SYSTEM:  Alert & Oriented, Good concentration      LINES:                                       MEDICATIONS:  MEDICATIONS  (STANDING):  acetylcysteine IVPB 12 Gram(s) IV Intermittent <User Schedule>  albumin human 25% IVPB 50 milliLiter(s) IV Intermittent every 6 hours  cefTRIAXone   IVPB      cefTRIAXone   IVPB 1000 milliGRAM(s) IV Intermittent every 24 hours  chlorhexidine 4% Liquid 1 Application(s) Topical <User Schedule>  chlorhexidine 4% Liquid 1 Application(s) Topical <User Schedule>  dexMEDEtomidine Infusion 0.5 MICROgram(s)/kG/Hr (10.1 mL/Hr) IV Continuous <Continuous>  dextrose 40% Gel 15 Gram(s) Oral once  dextrose 50% Injectable 25 Gram(s) IV Push once  dextrose 50% Injectable 25 Gram(s) IV Push once  dextrose 50% Injectable 12.5 Gram(s) IV Push once  folic acid Injectable 1 milliGRAM(s) IV Push daily  glucagon  Injectable 1 milliGRAM(s) IntraMuscular once  heparin  Infusion 1000 Unit(s)/Hr (10 mL/Hr) IV Continuous <Continuous>  norepinephrine Infusion 0.05 MICROgram(s)/kG/Min (7.59 mL/Hr) IV Continuous <Continuous>  octreotide  Infusion 50 MICROgram(s)/Hr (10 mL/Hr) IV Continuous <Continuous>  thiamine IVPB 500 milliGRAM(s) IV Intermittent every 8 hours    MEDICATIONS  (PRN):  heparin   Injectable 6500 Unit(s) IV Push every 6 hours PRN For aPTT less than 40  heparin   Injectable 3000 Unit(s) IV Push every 6 hours PRN For aPTT between 40 - 57  sodium chloride 0.9% lock flush 10 milliLiter(s) IV Push every 1 hour PRN Pre/post blood products, medications, blood draw, and to maintain line patency      ALLERGIES:  Allergies    No Known Allergies    Intolerances        LABS:                        7.4    18.86 )-----------( 90       ( 31 Aug 2021 00:40 )             21.1         125<L>  |  88<L>  |  58<H>  ----------------------------<  183<H>  4.1   |  16<L>  |  4.74<H>    Ca    8.3<L>      31 Aug 2021 04:12  Phos  2.6       Mg     2.3         TPro  6.4  /  Alb  3.5  /  TBili  22.8<H>  /  DBili  x   /  AST  144<H>  /  ALT  84<H>  /  AlkPhos  189<H>      PT/INR - ( 29 Aug 2021 21:01 )   PT: 19.8 sec;   INR: 1.69 ratio         PTT - ( 31 Aug 2021 06:08 )  PTT:94.8 sec  Urinalysis Basic - ( 30 Aug 2021 00:21 )    Color: Dark Yellow / Appearance: Turbid / S.018 / pH: x  Gluc: x / Ketone: Negative  / Bili: Moderate / Urobili: 2 mg/dL   Blood: x / Protein: 100 / Nitrite: Negative   Leuk Esterase: Large / RBC: 5 /hpf /  /HPF   Sq Epi: x / Non Sq Epi: 1 /hpf / Bacteria: Moderate        CAPILLARY BLOOD GLUCOSE          RADIOLOGY & ADDITIONAL TESTS: Reviewed. MICU PROGRESS NOTE    INTERVAL HPI/OVERNIGHT EVENTS: O/n started on CTX for + UA. Reported o/n with numerous BMs, lactulose was held.    SUBJECTIVE:   This AM lethargic.    OBJECTIVE:    VITAL SIGNS:  ICU Vital Signs Last 24 Hrs  T(C): 36.4 (31 Aug 2021 08:00), Max: 36.4 (31 Aug 2021 08:00)  T(F): 97.6 (31 Aug 2021 08:00), Max: 97.6 (31 Aug 2021 08:00)  HR: 79 (31 Aug 2021 07:45) (67 - 98)  BP: 120/57 (31 Aug 2021 07:45) (78/43 - 137/60)  BP(mean): 81 (31 Aug 2021 07:45) (55 - 92)  ABP: --  ABP(mean): --  RR: 11 (31 Aug 2021 07:45) (10 - 31)  SpO2: 96% (31 Aug 2021 07:45) (91% - 100%)      VENTS:      I&O:    08- @ 07:01  -  - @ 07:00  --------------------------------------------------------  IN: 5615.7 mL / OUT: 3696 mL / NET: 1919.7 mL        PHYSICAL EXAM:  GENERAL: sleeping, lethargic, Jaundice  HEENT: scleral icterus  CHEST/LUNG: Clear to auscultation bilaterally; No crackles, rhonchi, wheezing, or rubs  HEART: Regular rate and rhythm; No murmurs, rubs, or gallops  ABDOMEN: Soft, Nontender, +distended; Bowel sounds present  EXTREMITIES:  no edema  SKIN: No rashes or lesions  NERVOUS SYSTEM: aaox0 lethargic  LINES:        PANTERA Barone central line                               MEDICATIONS:  MEDICATIONS  (STANDING):  acetylcysteine IVPB 12 Gram(s) IV Intermittent <User Schedule>  albumin human 25% IVPB 50 milliLiter(s) IV Intermittent every 6 hours  cefTRIAXone   IVPB      cefTRIAXone   IVPB 1000 milliGRAM(s) IV Intermittent every 24 hours  chlorhexidine 4% Liquid 1 Application(s) Topical <User Schedule>  chlorhexidine 4% Liquid 1 Application(s) Topical <User Schedule>  dexMEDEtomidine Infusion 0.5 MICROgram(s)/kG/Hr (10.1 mL/Hr) IV Continuous <Continuous>  dextrose 40% Gel 15 Gram(s) Oral once  dextrose 50% Injectable 25 Gram(s) IV Push once  dextrose 50% Injectable 25 Gram(s) IV Push once  dextrose 50% Injectable 12.5 Gram(s) IV Push once  folic acid Injectable 1 milliGRAM(s) IV Push daily  glucagon  Injectable 1 milliGRAM(s) IntraMuscular once  heparin  Infusion 1000 Unit(s)/Hr (10 mL/Hr) IV Continuous <Continuous>  norepinephrine Infusion 0.05 MICROgram(s)/kG/Min (7.59 mL/Hr) IV Continuous <Continuous>  octreotide  Infusion 50 MICROgram(s)/Hr (10 mL/Hr) IV Continuous <Continuous>  thiamine IVPB 500 milliGRAM(s) IV Intermittent every 8 hours    MEDICATIONS  (PRN):  heparin   Injectable 6500 Unit(s) IV Push every 6 hours PRN For aPTT less than 40  heparin   Injectable 3000 Unit(s) IV Push every 6 hours PRN For aPTT between 40 - 57  sodium chloride 0.9% lock flush 10 milliLiter(s) IV Push every 1 hour PRN Pre/post blood products, medications, blood draw, and to maintain line patency      ALLERGIES:  Allergies    No Known Allergies    Intolerances        LABS:                        7.4    18.86 )-----------( 90       ( 31 Aug 2021 00:40 )             21.1         125<L>  |  88<L>  |  58<H>  ----------------------------<  183<H>  4.1   |  16<L>  |  4.74<H>    Ca    8.3<L>      31 Aug 2021 04:12  Phos  2.6       Mg     2.3         TPro  6.4  /  Alb  3.5  /  TBili  22.8<H>  /  DBili  x   /  AST  144<H>  /  ALT  84<H>  /  AlkPhos  189<H>      PT/INR - ( 29 Aug 2021 21:01 )   PT: 19.8 sec;   INR: 1.69 ratio         PTT - ( 31 Aug 2021 06:08 )  PTT:94.8 sec  Urinalysis Basic - ( 30 Aug 2021 00:21 )    Color: Dark Yellow / Appearance: Turbid / S.018 / pH: x  Gluc: x / Ketone: Negative  / Bili: Moderate / Urobili: 2 mg/dL   Blood: x / Protein: 100 / Nitrite: Negative   Leuk Esterase: Large / RBC: 5 /hpf /  /HPF   Sq Epi: x / Non Sq Epi: 1 /hpf / Bacteria: Moderate        CAPILLARY BLOOD GLUCOSE          RADIOLOGY & ADDITIONAL TESTS: Reviewed.

## 2021-08-31 NOTE — PROGRESS NOTE ADULT - SUBJECTIVE AND OBJECTIVE BOX
Chief Complaint:  Patient is a 56y old  Female who presents with a chief complaint of CRRT (31 Aug 2021 07:15)      Interval Events:       Hospital Medications:  acetylcysteine IVPB 12 Gram(s) IV Intermittent <User Schedule>  albumin human 25% IVPB 50 milliLiter(s) IV Intermittent every 6 hours  cefTRIAXone   IVPB      cefTRIAXone   IVPB 1000 milliGRAM(s) IV Intermittent every 24 hours  chlorhexidine 4% Liquid 1 Application(s) Topical <User Schedule>  chlorhexidine 4% Liquid 1 Application(s) Topical <User Schedule>  dexMEDEtomidine Infusion 0.5 MICROgram(s)/kG/Hr IV Continuous <Continuous>  dextrose 40% Gel 15 Gram(s) Oral once  dextrose 50% Injectable 25 Gram(s) IV Push once  dextrose 50% Injectable 12.5 Gram(s) IV Push once  dextrose 50% Injectable 25 Gram(s) IV Push once  folic acid Injectable 1 milliGRAM(s) IV Push daily  glucagon  Injectable 1 milliGRAM(s) IntraMuscular once  heparin   Injectable 6500 Unit(s) IV Push every 6 hours PRN  heparin   Injectable 3000 Unit(s) IV Push every 6 hours PRN  heparin  Infusion 1000 Unit(s)/Hr IV Continuous <Continuous>  norepinephrine Infusion 0.05 MICROgram(s)/kG/Min IV Continuous <Continuous>  octreotide  Infusion 50 MICROgram(s)/Hr IV Continuous <Continuous>  sodium chloride 0.9% lock flush 10 milliLiter(s) IV Push every 1 hour PRN  thiamine IVPB 500 milliGRAM(s) IV Intermittent every 8 hours      PMHX/PSHX:  Anxiety    GERD (gastroesophageal reflux disease)    Constipation    Fatty liver    IBS (irritable bowel syndrome)    Liver cirrhosis    HLD (hyperlipidemia)    Claustrophobia    ETOH abuse    Lipoma    H/O colonoscopy    H/O endoscopy    Lipoma of back        ROS: 14 point ROS negative unless otherwise stated in subjective        PHYSICAL EXAM:     GENERAL:  Appears stated age, +mildly confused, unkempt, no distress  HEENT:  NC/AT,  +scleral icterus  CHEST:  Full & symmetric excursion, no increased effort, breath sounds clear  HEART:  Regular rhythm, S1, S2, no murmur/rub/S3/S4  ABDOMEN:  Soft, non-tender, non-distended, normoactive bowel sounds  EXTREMITIES:  +trace BL LE edema  SKIN:  +jaundiced  NEURO:  Alert, orientedx1, +asterixis    Vital Signs:  Vital Signs Last 24 Hrs  T(C): 35.7 (31 Aug 2021 04:00), Max: 36.3 (30 Aug 2021 08:00)  T(F): 96.3 (31 Aug 2021 04:00), Max: 97.3 (30 Aug 2021 08:00)  HR: 75 (31 Aug 2021 07:00) (67 - 98)  BP: 122/57 (31 Aug 2021 07:00) (78/43 - 137/60)  BP(mean): 82 (31 Aug 2021 07:00) (55 - 92)  RR: 11 (31 Aug 2021 07:00) (10 - 31)  SpO2: 97% (31 Aug 2021 07:00) (91% - 100%)  Daily     Daily     LABS:                        7.4    18.86 )-----------( 90       ( 31 Aug 2021 00:40 )             21.1     08    125<L>  |  88<L>  |  58<H>  ----------------------------<  183<H>  4.1   |  16<L>  |  4.74<H>    Ca    8.3<L>      31 Aug 2021 04:12  Phos  2.6       Mg     2.3         TPro  6.4  /  Alb  3.5  /  TBili  22.8<H>  /  DBili  x   /  AST  144<H>  /  ALT  84<H>  /  AlkPhos  189<H>      LIVER FUNCTIONS - ( 31 Aug 2021 04:12 )  Alb: 3.5 g/dL / Pro: 6.4 g/dL / ALK PHOS: 189 U/L / ALT: 84 U/L / AST: 144 U/L / GGT: x           PT/INR - ( 29 Aug 2021 21:01 )   PT: 19.8 sec;   INR: 1.69 ratio         PTT - ( 31 Aug 2021 06:08 )  PTT:94.8 sec  Urinalysis Basic - ( 30 Aug 2021 00:21 )    Color: Dark Yellow / Appearance: Turbid / S.018 / pH: x  Gluc: x / Ketone: Negative  / Bili: Moderate / Urobili: 2 mg/dL   Blood: x / Protein: 100 / Nitrite: Negative   Leuk Esterase: Large / RBC: 5 /hpf /  /HPF   Sq Epi: x / Non Sq Epi: 1 /hpf / Bacteria: Moderate      Amylase Serum--      Lipase serum--       [id number]      Imaging:             Chief Complaint:  Patient is a 56y old  Female who presents with a chief complaint of CRRT (31 Aug 2021 07:15)      Interval Events: Somnolent this afternoon. Per nursing and primary team was reportedly agitated and encephalopathic, was started on precedex gtt for agitation.      Hospital Medications:  acetylcysteine IVPB 12 Gram(s) IV Intermittent <User Schedule>  albumin human 25% IVPB 50 milliLiter(s) IV Intermittent every 6 hours  cefTRIAXone   IVPB      cefTRIAXone   IVPB 1000 milliGRAM(s) IV Intermittent every 24 hours  chlorhexidine 4% Liquid 1 Application(s) Topical <User Schedule>  chlorhexidine 4% Liquid 1 Application(s) Topical <User Schedule>  dexMEDEtomidine Infusion 0.5 MICROgram(s)/kG/Hr IV Continuous <Continuous>  dextrose 40% Gel 15 Gram(s) Oral once  dextrose 50% Injectable 25 Gram(s) IV Push once  dextrose 50% Injectable 12.5 Gram(s) IV Push once  dextrose 50% Injectable 25 Gram(s) IV Push once  folic acid Injectable 1 milliGRAM(s) IV Push daily  glucagon  Injectable 1 milliGRAM(s) IntraMuscular once  heparin   Injectable 6500 Unit(s) IV Push every 6 hours PRN  heparin   Injectable 3000 Unit(s) IV Push every 6 hours PRN  heparin  Infusion 1000 Unit(s)/Hr IV Continuous <Continuous>  norepinephrine Infusion 0.05 MICROgram(s)/kG/Min IV Continuous <Continuous>  octreotide  Infusion 50 MICROgram(s)/Hr IV Continuous <Continuous>  sodium chloride 0.9% lock flush 10 milliLiter(s) IV Push every 1 hour PRN  thiamine IVPB 500 milliGRAM(s) IV Intermittent every 8 hours      PMHX/PSHX:  Anxiety    GERD (gastroesophageal reflux disease)    Constipation    Fatty liver    IBS (irritable bowel syndrome)    Liver cirrhosis    HLD (hyperlipidemia)    Claustrophobia    ETOH abuse    Lipoma    H/O colonoscopy    H/O endoscopy    Lipoma of back        ROS: unable to obtain as pt was somnolent        PHYSICAL EXAM:     GENERAL:  Appears stated age, +somnolent  HEENT:  NC/AT, eyes closed  CHEST:  Full & symmetric excursion, no increased effort, breath sounds clear  HEART:  Regular rhythm, S1, S2, no murmur/rub/S3/S4  ABDOMEN:  Soft, non-tender, non-distended, normoactive bowel sounds  EXTREMITIES:  1+ BL LE edema  SKIN:  +jaundiced  NEURO:  +somnolent and difficult to arouse, on sedation    Vital Signs:  Vital Signs Last 24 Hrs  T(C): 35.7 (31 Aug 2021 04:00), Max: 36.3 (30 Aug 2021 08:00)  T(F): 96.3 (31 Aug 2021 04:00), Max: 97.3 (30 Aug 2021 08:00)  HR: 75 (31 Aug 2021 07:00) (67 - 98)  BP: 122/57 (31 Aug 2021 07:00) (78/43 - 137/60)  BP(mean): 82 (31 Aug 2021 07:00) (55 - 92)  RR: 11 (31 Aug 2021 07:00) (10 - 31)  SpO2: 97% (31 Aug 2021 07:00) (91% - 100%)  Daily     Daily     LABS:                        7.4    18.86 )-----------( 90       ( 31 Aug 2021 00:40 )             21.1     08    125<L>  |  88<L>  |  58<H>  ----------------------------<  183<H>  4.1   |  16<L>  |  4.74<H>    Ca    8.3<L>      31 Aug 2021 04:12  Phos  2.6       Mg     2.3         TPro  6.4  /  Alb  3.5  /  TBili  22.8<H>  /  DBili  x   /  AST  144<H>  /  ALT  84<H>  /  AlkPhos  189<H>      LIVER FUNCTIONS - ( 31 Aug 2021 04:12 )  Alb: 3.5 g/dL / Pro: 6.4 g/dL / ALK PHOS: 189 U/L / ALT: 84 U/L / AST: 144 U/L / GGT: x           PT/INR - ( 29 Aug 2021 21:01 )   PT: 19.8 sec;   INR: 1.69 ratio         PTT - ( 31 Aug 2021 06:08 )  PTT:94.8 sec  Urinalysis Basic - ( 30 Aug 2021 00:21 )    Color: Dark Yellow / Appearance: Turbid / S.018 / pH: x  Gluc: x / Ketone: Negative  / Bili: Moderate / Urobili: 2 mg/dL   Blood: x / Protein: 100 / Nitrite: Negative   Leuk Esterase: Large / RBC: 5 /hpf /  /HPF   Sq Epi: x / Non Sq Epi: 1 /hpf / Bacteria: Moderate      Amylase Serum--      Lipase serum--       [id number]      Imaging:     EXAM:  CT ABDOMEN AND PELVIS IC                            PROCEDURE DATE:  2021            INTERPRETATION:  CLINICAL INFORMATION: Alcoholic cirrhosis, evaluate for portal vein thrombus.    COMPARISON: Abdominal ultrasound 2021.    CONTRAST/COMPLICATIONS:  IV Contrast: Omnipaque 350. 90 cc administered. 10 cc discarded.  Oral Contrast: None  Complications: None    PROCEDURE:  CT of the Abdomen and Pelvis was performed.  Arterial, Portal Venous and Delayed phases were acquired.  Sagittal and coronal reformats were performed.    FINDINGS:  LOWER CHEST: Bibasilar atelectasis.    LIVER: Cirrhosis. A hypoattenuating lesion in segment measures 3.1 x 2.5 cm. No definite arterial phase hyperenhancement, although may be suboptimal timing as the hepatic veins are opacified.  BILE DUCTS: Normal caliber.  GALLBLADDER: Within normal limits.  SPLEEN: Splenomegaly.  PANCREAS: Within normal limits.  ADRENALS: Within normal limits.  KIDNEYS/URETERS: Within normal limits.    BLADDER: Wang catheter.  REPRODUCTIVE ORGANS: Fibroid uterus.    BOWEL: Mild wall thickening of the ascending colon may be related to portal colopathy. Colonic diverticulosis. No bowel obstruction. Appendix is normal.  PERITONEUM: Trace perihepatic and pelvic ascites.  VESSELS: Patent hepatic and portal veins. Small upper abdominal varices. Atherosclerotic changes.  RETROPERITONEUM/LYMPH NODES: No lymphadenopathy.  ABDOMINAL WALL: Within normal limits.  BONES: Degenerative changes.    IMPRESSION:    Cirrhosis with evidence of portal hypertension. No evidence of portal vein thrombosis.    A 3.1 cm hypoattenuating liver lesion is indeterminate on this exam. Recommend contrast-enhanced MRI for further evaluation.

## 2021-08-31 NOTE — PROGRESS NOTE ADULT - ATTENDING COMMENTS
Patient seen and examined. Patient critically ill requiring frequent bedside visits with therapy change. Patient is a 56F EtOH abuse, cirrhosis/portal HTN, IBS, and HLD who is transferred from [facility] for liver and renal dysfunction. She was admitted to MICU for concern of hepatorenal syndrome and initiation of CRRT.    1. Shock state - in the setting of hepatorenal syndrome will aim for a MAP > 75 with Levophed assistance.  - Continue Ceftriaxone for possible UTI and followup cultures  2. Acute renal Failure - likely hepatorenal now on CRRT. Will continue net even. Nephrology followup. Serial BMP  - Hyponatremia stable - now off D5  3. Acute Liver failure - patient with elevated bilirubin in the setting of known EtOh use. Hepatology evaluation. Will continue NAC x 5 days. Will hold off steroids at this time per d/w Hepatology.  - US with possible portal vein clots - followup official CT abdomen/pelvis and if no portal vein thrombus will D/C Hep gtt  - HCV weakly positive  - Octreotide and Albumin  - Asterixis - Lactulose and Rifaximin as able depending on PO access  - prior EGD without evidence of varices 2019 - ? if patient can have PO access  4. Pulmonary - stable and on RA. RVP with evidence of adenovirus  5. Infectious Disease - followup cultures. Monitor off antibiotics for now. Minimal ascites on initial POCUS    Long term prognosis guarded. Nephrology and Hepatology followup.

## 2021-08-31 NOTE — PROGRESS NOTE ADULT - ASSESSMENT
56 y.o. F with PMH of ETOH misuse, ETOH cirrhosis, HLD, IBS, anxiety, presents with decompensated alcoholic cirrhosis c/b HRS, likely contributing to acute renal failure and hyponatremia, now requiring CRRT, transferred to [facility] MICU for further management.     Neuro  A&O x 3 at this time    #Hepatic encephalopathy  -lactulose 20q8  -rifaximin  -titrate to 4-5BM daily    # ETOH Misuse  - Continue to monitor for w/d symptoms; pt reported last drink was 2.5 wks ago  - SW consult & cessation counseling   - c/w thiamine and folate     # Anxiety  - C/w Xanax PRN   - See ISTOP     Cardiovascular  #Hypotension - on levophed  - Monitor BP while on CRRT, ma need pressors (ideally levo given HRS)  - Maintain MAP goal >80 given HRS as per hepatology     Pulmonary  - Normal O2 Saturation on room air, monitor for volume overload in s/o ARF and hypervolemia     GI  # Hepatorenal syndrome   - See Renal     # ETOH Cirrhosis   - Discontinue solumedrol 40mg IV qD and f/u infectious work up as per discussion with hepatology   - Hold off on NAC as may worsen hyponatremia   - Maddrey score: 52.4 indicative of poor prognosis  - MELD Score: 38 --?65-66% risk of 90 day mortality   - C/w lactulose 20mg TID and titrate to 3BM daily   - Pt anuric now, unclear if spironolactone will be helpful  - No evidence of ascites on U/S abd   - Trend liver labs daily CMP   -  8/30 start NAC    Endoscopy in 2019: no varices, stomach with congestion c/w gastritis vs portal gastropathy    # Hep C (AB+?)   - F/u viral load  - Hep B negative  -Hep C weakly reactive    #R/o portal vein thrombosis  -hep gtt for now   [ ] triple phase CT A/P to r/o portal vein thrombosis    Renal  # Hepatorenal syndrome  - Suspect acute renal failure 2/2 HRS in s/o alcoholic hepatitis and cirrhosis   - C/w octreotide, albumin gtt  - On CRRT  - Baseline Cr 2.5  - Monitor Cr and avoid nephrotoxic agents  - Renally dose meds   - MAP goal 80+  - Monitor I/Os     # Hyponatremia  - Euvolemic on exam; possibly 2/2 ARF and or poor oral intake in s/o ETOH use   - F/u urine lytes and serum osm   - Na 114-->118 in 24 hours   - S/p NS boluses while at [place]  - Trend BMP Mg Phos q4   - Expect Na to improve as volume status improves with CRRT   - Avoid rapid correction, goal 6-8 mEq in 24 hrs   -Increase D5 from 100cc/hr to 150cc/hr    # Metabolic acidosis   - Likely 2/2 renal loss of bicarb and inability to excrete H+ in s/o ARF   - D/c NaHCO3 drip as per renal   - Expect improvement with CRRT   - Monitor BMP q4     ID  # SBP PPx   - Mild ascites per POCUS   - Low suspicion for SBP, defer CTX   - Afebrile without leukocytosis so far   - F/u infectious work up including UA, CXR   monitor off ax    Heme  # Macrocytic anemia & Thrombocytopenia   - Likely in s/o poor nutritional status from ETOH use  - Check B12 folate iron studies  - Folate thiamine supplement    # Coagulopathy   - In s/o decompensated alcoholic cirrhosis and ARF   - Give DDAVP for likely plt dysfunction   - Trend daily PT INR PTT     # Portal vein occlusion  - U/S Abd doppler from [facility] [place] revealed main and left portal vein occlusions  - Cirrhosis induced coagulopathy, start AC in the am after catheter access is established if H/H is stable     Ethics  # FULL code   -  is surrogate decision maker  56 y.o. F with PMH of ETOH misuse, ETOH cirrhosis, HLD, IBS, anxiety, presents with decompensated alcoholic cirrhosis c/b HRS, likely contributing to acute renal failure and hyponatremia, now requiring CRRT, transferred to [facility] MICU for further management.     Neuro  aaox0, lethargic    #Hepatic encephalopathy  -Restart lactulose 20q8, rifaximin if ok to put in NGT, will touch base with hep   -may need lactulose enema  -titrate to 4-5BM daily  -stool count    # ETOH Misuse  - Continue to monitor for w/d symptoms; pt reported last drink was 2.5 wks ago  - SW consult & cessation counseling   - c/w thiamine and folate     # Anxiety  - C/w Xanax PRN   - See ISTOP     Cardiovascular  #Hypotension - on levophed  - Maintain MAP goal >80 given HRS as per hepatology     Pulmonary  - Normal O2 Saturation on room air, monitor for volume overload in s/o ARF and hypervolemia   -monitor ability to protect airway    GI  # Hepatorenal syndrome   - See Renal     # ETOH Cirrhosis   - s/p solumedrol 40mg IV qD  -- discontinued as per hepatology  - Maddrey score: 52.4 indicative of poor prognosis  - MELD Score: 38 --?65-66% risk of 90 day mortality   - Pt anuric now, unclear if spironolactone will be helpful  - No evidence of ascites on U/S abd    - Trend liver labs daily CMP   -  8/30 start NAC    Endoscopy in 2019: no varices, stomach with congestion c/w gastritis vs portal gastropathy    # Hep C (AB+?)   - F/u viral load  - Hep B negative  -Hep C weakly reactive    #R/o portal vein thrombosis  -hep gtt for now   [ ] triple phase CT A/P to r/o portal vein thrombosis    Renal  # Hepatorenal syndrome  - Suspect acute renal failure 2/2 HRS in s/o alcoholic hepatitis and cirrhosis   - C/w octreotide, albumin gtt  - On CRRT  - Baseline Cr 2.5  - Monitor Cr and avoid nephrotoxic agents  - Renally dose meds   - MAP goal 80+  - Monitor I/Os     # Hyponatremia  - Euvolemic on exam; possibly 2/2 ARF and or poor oral intake in s/o ETOH use   - F/u urine lytes and serum osm   - Na 114-->118 in 24 hours   - S/p NS boluses while at [place]  - Trend BMP Mg Phos q4   - Expect Na to improve as volume status improves with CRRT   - Avoid rapid correction, goal 6-8 mEq in 24 hrs   -Increase D5 from 100cc/hr to 150cc/hr    # Metabolic acidosis   - Likely 2/2 renal loss of bicarb and inability to excrete H+ in s/o ARF   - D/c NaHCO3 drip as per renal   - Expect improvement with CRRT   - Monitor BMP q4     ID  # SBP PPx   - Mild ascites per POCUS   - Low suspicion for SBP, defer CTX   - Afebrile without leukocytosis so far   - F/u infectious work up including UA, CXR   monitor off ax    Heme  # Macrocytic anemia & Thrombocytopenia   - Likely in s/o poor nutritional status from ETOH use  - Check B12 folate iron studies  - Folate thiamine supplement    # Coagulopathy   - In s/o decompensated alcoholic cirrhosis and ARF   - Give DDAVP for likely plt dysfunction   - Trend daily PT INR PTT     # Portal vein occlusion  - U/S Abd doppler from [facility] revealed main and left portal vein occlusions  - Cirrhosis induced coagulopathy, start AC in the am after catheter access is established if H/H is stable     Ethics  # FULL code   -  is surrogate decision maker  56 y.o. F with PMH of ETOH misuse, ETOH cirrhosis, HLD, IBS, anxiety, presents with decompensated alcoholic cirrhosis c/b HRS, likely contributing to acute renal failure and hyponatremia, now requiring CRRT, transferred to [facility] MICU for further management.     Neuro  aaox0, lethargic    #Hepatic encephalopathy  -Restart lactulose 20q8, rifaximin if ok to put in NGT, will touch base with hep   -may need lactulose enema  -titrate to 4-5BM daily  -stool count    # ETOH Misuse  - Continue to monitor for w/d symptoms; pt reported last drink was 2.5 wks ago  - SW consult & cessation counseling   - c/w thiamine and folate     # Anxiety  - C/w Xanax PRN   - See ISTOP     Cardiovascular  #Hypotension - on levophed  - Maintain MAP goal >80 given HRS as per hepatology     Pulmonary  - Normal O2 Saturation on room air, monitor for volume overload in s/o ARF and hypervolemia   -monitor ability to protect airway    GI  # Hepatorenal syndrome   - See Renal     # ETOH Cirrhosis   - s/p solumedrol 40mg IV qD  -- discontinued as per hepatology  - Maddrey score: 52.4 indicative of poor prognosis  - MELD Score: 38 --?65-66% risk of 90 day mortality   - Pt anuric now, unclear if spironolactone will be helpful  - No evidence of ascites on U/S abd    - Trend liver labs daily CMP   -  NAC - started 8/30  [ ] will d/w hepatology re: ngt    Endoscopy in 2019: no varices, stomach with congestion c/w gastritis vs portal gastropathy    Negative Hep B surface Ag, Hep B core Ab, Hep A IgM  Weakly reactive Hep C Ab  [  ]Hep E IgM  [ ]check serum FARHANA, smooth muscle Ab, anti-LKM-1 Ab, alpha-1 antitrypsin, ferritin, ceruloplasmin, copper, immunoglobulin panel (IgG, IgA, IgM)      # Hep C   - F/u Hep C viral load  - Hep B negative  -Hep C weakly reactive    #R/o portal vein thrombosis  -hep gtt for now   [ ] triple phase CT A/P to r/o portal vein thrombosis -- prelim negative -- if final read negative will dc heparin gtt    Renal  # Hepatorenal syndrome  - Suspect acute renal failure 2/2 HRS in s/o alcoholic hepatitis and cirrhosis   - C/w octreotide, albumin gtt  - On CRRT  - Baseline Cr 2.5  - Monitor Cr and avoid nephrotoxic agents  - Renally dose meds   - MAP goal 80+  - Monitor I/Os     # Hyponatremia - 114 on presentation  - Euvolemic on exam; possibly 2/2 ARF and or poor oral intake in s/o ETOH use   - Angela 20, suggestive of decreased PO intake  - S/p NS boluses while at [place]  - Trend BMP Mg Phos q4   - Expect Na to improve as volume status improves with CRRT   - Avoid rapid correction, goal 6-8 mEq in 24 hrs   - Sodium stable at 125 --> discontinue D5, slowly raise sodium    #Hypokalemia  -pt already on max K bath    # Metabolic acidosis   - Likely 2/2 renal loss of bicarb and inability to excrete H+ in s/o ARF   - D/c NaHCO3 drip as per renal   - Expect improvement with CRRT   - Monitor BMP q4     ID  CXR clear lungs    # SBP PPx   - Mild ascites per POCUS   - Low suspicion for SBP, defer CTX   - Afebrile without leukocytosis so far     #UTI  -CTX 8/30 -xx  [ ] f/u ucx    Heme  # Macrocytic anemia & Thrombocytopenia   - Likely in s/o poor nutritional status from ETOH use  - Check B12 folate iron studies  - Folate thiamine supplement    # Coagulopathy   - In s/o decompensated alcoholic cirrhosis and ARF   - Give DDAVP for likely plt dysfunction   -[ ] Trend daily PT INR PTT   8/31 INR worsening    # Portal vein occlusion  - U/S Abd doppler from [facility] revealed main and left portal vein occlusions  - Cirrhosis induced coagulopathy  See Gi section    Ethics  # FULL code   -  is surrogate decision maker  56 y.o. F with PMH of ETOH misuse, ETOH cirrhosis, HLD, IBS, anxiety, presents with decompensated alcoholic cirrhosis c/b HRS, likely contributing to acute renal failure and hyponatremia s/p now on CRRT, transferred to [facility] MICU, course c/b hepatic encephalopathy    Neuro  aaox0, lethargic    #Hepatic encephalopathy  -Restart lactulose 20q8, rifaximin if ok to put in NGT, will touch base with hep   -may need lactulose enema  -titrate to 4-5BM daily  -stool count    # ETOH Misuse  - Continue to monitor for w/d symptoms; pt reported last drink was 2.5 wks ago  - SW consult & cessation counseling   - c/w thiamine and folate     # Anxiety  - C/w Xanax PRN   - See ISTOP     Cardiovascular  #Hypotension - on levophed  - Maintain MAP goal >80 given HRS as per hepatology     Pulmonary  - Normal O2 Saturation on room air, monitor for volume overload in s/o ARF and hypervolemia   -monitor ability to protect airway    GI  # Hepatorenal syndrome   - See Renal     # ETOH Cirrhosis   - s/p solumedrol 40mg IV qD  -- discontinued as per hepatology  - Maddrey score: 52.4 indicative of poor prognosis  - MELD Score: 38 --?65-66% risk of 90 day mortality   - Pt anuric now, unclear if spironolactone will be helpful  - No evidence of ascites on U/S abd    - Trend liver labs daily CMP   -  NAC - started 8/30  [ ] will d/w hepatology re: ngt    Endoscopy in 2019: no varices, stomach with congestion c/w gastritis vs portal gastropathy    Negative Hep B surface Ag, Hep B core Ab, Hep A IgM  Weakly reactive Hep C Ab  [  ]Hep E IgM  [ ]check serum FARHANA, smooth muscle Ab, anti-LKM-1 Ab, alpha-1 antitrypsin, ferritin, ceruloplasmin, copper, immunoglobulin panel (IgG, IgA, IgM)      # Hep C   - F/u Hep C viral load  - Hep B negative  -Hep C weakly reactive    #R/o portal vein thrombosis  -hep gtt for now   [ ] triple phase CT A/P to r/o portal vein thrombosis -- prelim negative -- if final read negative will dc heparin gtt    Renal  # Hepatorenal syndrome  - Suspect acute renal failure 2/2 HRS in s/o alcoholic hepatitis and cirrhosis   - C/w octreotide, albumin gtt  - On CRRT  - Baseline Cr 2.5  - Monitor Cr and avoid nephrotoxic agents  - Renally dose meds   - MAP goal 80+  - Monitor I/Os     # Hyponatremia - 114 on presentation  - Euvolemic on exam; possibly 2/2 ARF and or poor oral intake in s/o ETOH use   - Angela 20, suggestive of decreased PO intake  - S/p NS boluses while at [place]  - Trend BMP Mg Phos q4   - Expect Na to improve as volume status improves with CRRT   - Avoid rapid correction, goal 6-8 mEq in 24 hrs   - Sodium stable at 125 --> discontinue D5, slowly raise sodium    #Hypokalemia  -pt already on max K bath    # Metabolic acidosis   - Likely 2/2 renal loss of bicarb and inability to excrete H+ in s/o ARF   - D/c NaHCO3 drip as per renal   - Expect improvement with CRRT   - Monitor BMP q4     ID  CXR clear lungs    # SBP PPx   - Mild ascites per POCUS   - Low suspicion for SBP, defer CTX   - Afebrile without leukocytosis so far     #UTI  -CTX 8/30 -xx  [ ] f/u ucx    Heme  # Macrocytic anemia & Thrombocytopenia   - Likely in s/o poor nutritional status from ETOH use  - Check B12 folate iron studies  - Folate thiamine supplement    # Coagulopathy   - In s/o decompensated alcoholic cirrhosis and ARF   - Give DDAVP for likely plt dysfunction   -[ ] Trend daily PT INR PTT   8/31 INR worsening    # Portal vein occlusion  - U/S Abd doppler from [facility] revealed main and left portal vein occlusions  - Cirrhosis induced coagulopathy  See Gi section    Ethics  # FULL code   -  is surrogate decision maker

## 2021-08-31 NOTE — DIETITIAN INITIAL EVALUATION ADULT. - PERTINENT MEDS FT
MEDICATIONS  (STANDING):  acetylcysteine IVPB 12 Gram(s) IV Intermittent <User Schedule>  albumin human 25% IVPB 50 milliLiter(s) IV Intermittent every 6 hours  cefTRIAXone   IVPB      cefTRIAXone   IVPB 1000 milliGRAM(s) IV Intermittent every 24 hours  chlorhexidine 4% Liquid 1 Application(s) Topical <User Schedule>  chlorhexidine 4% Liquid 1 Application(s) Topical <User Schedule>  CRRT Treatment    <Continuous>  dexMEDEtomidine Infusion 0.2 MICROgram(s)/kG/Hr (4.05 mL/Hr) IV Continuous <Continuous>  dextrose 40% Gel 15 Gram(s) Oral once  dextrose 50% Injectable 25 Gram(s) IV Push once  dextrose 50% Injectable 12.5 Gram(s) IV Push once  dextrose 50% Injectable 25 Gram(s) IV Push once  folic acid Injectable 1 milliGRAM(s) IV Push daily  glucagon  Injectable 1 milliGRAM(s) IntraMuscular once  heparin   Injectable 5000 Unit(s) SubCutaneous every 8 hours  lactulose Syrup 20 Gram(s) Oral every 6 hours  norepinephrine Infusion 0.05 MICROgram(s)/kG/Min (7.59 mL/Hr) IV Continuous <Continuous>  PrismaSATE Dialysate BGK 4 / 2.5 5000 milliLiter(s) (900 mL/Hr) CRRT <Continuous>  PrismaSOL Filtration BGK 4 / 2.5 5000 milliLiter(s) (700 mL/Hr) CRRT <Continuous>  PrismaSOL Filtration BGK 4 / 2.5 5000 milliLiter(s) (200 mL/Hr) CRRT <Continuous>  rifAXIMin 550 milliGRAM(s) Oral two times a day  thiamine IVPB 500 milliGRAM(s) IV Intermittent every 8 hours    MEDICATIONS  (PRN):  sodium chloride 0.9% lock flush 10 milliLiter(s) IV Push every 1 hour PRN Pre/post blood products, medications, blood draw, and to maintain line patency

## 2021-08-31 NOTE — PROGRESS NOTE ADULT - ATTENDING COMMENTS
57 yo F w/ history active alcohol use disorder (last drink about 2 weeks ago), known ETOH cirrhosis c/b ascites p/w [facility] with worsening jaundice and renal failure requiring renal replacement therapy transferred here further care.    She is currently in the ICU and critically ill.   Unable to assess mental status due to sedation but needs more lactulose + rifaximin.  Dobhoff now in place.  CRRT per ICU team but suspect HRS.  CT scan with patent portal veins, can stop heparin drip.  She has continued to drink despite knowledge of her liver disease for several years and appears to have a lack of insight. She is not a transplant candidate currently and will need prolonged sobriety and attendance of a relapse prevention program.  Will try to get collaborative information from her GI physician Dr. Williams López.

## 2021-08-31 NOTE — PROGRESS NOTE ADULT - ASSESSMENT
56 y.o. Female with PMhx ETOH use disorder (last drink ~2 weeks ago), ETOH cirrhosis with ascites, splenomegaly, HLD, IBS, anxiety presented from [facility] for severe hyponatremia and JULIANA requiring CRRT. Nephrology consulted for JULIANA and hyponatremia.       #JULIANA  Pt with JULIANA in the setting of severe liver disease.   Upon review of [facility] HIE/[employer] last Cr was in 03/2019 and Cr 0.89 mg/dl.   Upon admission to OSH Cr was 16 (8/28/21) with .   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   initiated on CRRT 8/29; continue with same  remains oliguric JULIANA  goal for volume is to maintain NET EVEN   Monitor labs and urine output.   Avoid NSAIDs, ACEI/ARBS, RCA and nephrotoxins.   Dose medications as per CRRT.     #hyponatremia   Pt. with severe hyponatremia in setting of severe liver disease  Serum sodium was 114 on admission to OSH (8/28); corrected to 125 this morning   - agree with holding D5W  serum osm 317; urine osm 340; urine Na 20  --- elevated serum osm likely in the setting of hyperbilirubinemia and uremia  Monitor serum sodium Q4 hours.  Do not correct serum sodium >6-8 meq/day.   56 y.o. Female with PMhx ETOH use disorder (last drink ~2 weeks ago), ETOH cirrhosis with ascites, splenomegaly, HLD, IBS, anxiety presented from [facility] for severe hyponatremia and JULIANA requiring CRRT. Nephrology consulted for JULIANA and hyponatremia.       #JULIANA  Pt with JULIANA in the setting of severe liver disease.   Upon review of [facility] HIE/[employer] last Cr was in 03/2019 and Cr 0.89 mg/dl.   Upon admission to OSH Cr was 16 (8/28/21) with .   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   initiated on CRRT 8/29; continue with same  remains oliguric JULIANA  goal for volume is to maintain NET EVEN   Monitor labs and urine output.   Avoid NSAIDs, ACEI/ARBS, RCA and nephrotoxins.   Dose medications as per CRRT.     #hyponatremia   Pt. with severe hyponatremia in setting of severe liver disease  Serum sodium was 114 on admission to OSH (8/28); corrected to 125 this morning   - agree with holding D5W  serum osm 317; urine osm 340; urine Na 20  --- elevated serum osm likely in the setting of hyperbilirubinemia and uremia  Do not correct serum sodium >6-8 meq/day.    If any questions, please feel free to contact me     Santy Singh  Nephrology Fellow  [facility] Pager: [phone]

## 2021-08-31 NOTE — DIETITIAN INITIAL EVALUATION ADULT. - PERTINENT LABORATORY DATA
08-31 @ 08:35: Na 125<L>, BUN 47<H>, Cr 3.90<H>, <H>, K+ 3.8, Phos 2.1<L>, Mg 2.3, Alk Phos --, ALT/SGPT --, AST/SGOT --, HbA1c --  08-31 @ 04:12: Na 125<L>, BUN 58<H>, Cr 4.74<H>, <H>, K+ 4.1, Phos 2.6, Mg 2.3, Alk Phos 189<H>, ALT/SGPT 84<H>, AST/SGOT 144<H>, HbA1c --  08-31 @ 00:40: Na 126<L>, BUN 71<H>, Cr 5.46<H>, <H>, K+ 3.1<L>, Phos 3.2, Mg 2.2, Alk Phos 201<H>, ALT/SGPT 85<H>, AST/SGOT 146<H>, HbA1c --  08-30 @ 15:51: Na 125<L>, BUN 91<H>, Cr 7.77<H>, <H>, K+ 3.2<L>, Phos 5.5<H>, Mg 2.4, Alk Phos 204<H>, ALT/SGPT 81<H>, AST/SGOT 158<H>, HbA1c --    8/29: A1C 5%

## 2021-08-31 NOTE — DIETITIAN INITIAL EVALUATION ADULT. - REASON INDICATOR FOR ASSESSMENT
Pt seen for LOS and consult for tube feeds.   Source: EMR Pt seen for LOS and consult for tube feeds.   Source: EMR, pt confused, lethargic, sleeping at this time

## 2021-08-31 NOTE — PROGRESS NOTE ADULT - SUBJECTIVE AND OBJECTIVE BOX
[facility] DIVISION OF KIDNEY DISEASES AND HYPERTENSION -- FOLLOW UP NOTE  --------------------------------------------------------------------------------  Chief Complaint:    24 hour events/subjective:    seen and examined this morning   remains in the ICU on CRRT and critically ill   no acute complaints this morning   no acute issues overnight    PAST HISTORY  --------------------------------------------------------------------------------  No significant changes to PMH, PSH, FHx, SHx, unless otherwise noted    ALLERGIES & MEDICATIONS  --------------------------------------------------------------------------------  Allergies    No Known Allergies    Intolerances      Standing Inpatient Medications  acetylcysteine IVPB 12 Gram(s) IV Intermittent <User Schedule>  albumin human 25% IVPB 50 milliLiter(s) IV Intermittent every 6 hours  cefTRIAXone   IVPB      cefTRIAXone   IVPB 1000 milliGRAM(s) IV Intermittent every 24 hours  chlorhexidine 4% Liquid 1 Application(s) Topical <User Schedule>  chlorhexidine 4% Liquid 1 Application(s) Topical <User Schedule>  dexMEDEtomidine Infusion 0.5 MICROgram(s)/kG/Hr IV Continuous <Continuous>  dextrose 40% Gel 15 Gram(s) Oral once  dextrose 50% Injectable 25 Gram(s) IV Push once  dextrose 50% Injectable 12.5 Gram(s) IV Push once  dextrose 50% Injectable 25 Gram(s) IV Push once  folic acid Injectable 1 milliGRAM(s) IV Push daily  glucagon  Injectable 1 milliGRAM(s) IntraMuscular once  heparin  Infusion 1000 Unit(s)/Hr IV Continuous <Continuous>  norepinephrine Infusion 0.05 MICROgram(s)/kG/Min IV Continuous <Continuous>  octreotide  Infusion 50 MICROgram(s)/Hr IV Continuous <Continuous>  thiamine IVPB 500 milliGRAM(s) IV Intermittent every 8 hours    PRN Inpatient Medications  heparin   Injectable 6500 Unit(s) IV Push every 6 hours PRN  heparin   Injectable 3000 Unit(s) IV Push every 6 hours PRN  sodium chloride 0.9% lock flush 10 milliLiter(s) IV Push every 1 hour PRN      REVIEW OF SYSTEMS      All other systems were reviewed and are negative, except as noted.    VITALS/PHYSICAL EXAM  --------------------------------------------------------------------------------  T(C): 35.7 (08-31-21 @ 04:00), Max: 36.3 (08-30-21 @ 08:00)  HR: 75 (08-31-21 @ 07:00) (67 - 98)  BP: 122/57 (08-31-21 @ 07:00) (78/43 - 137/60)  RR: 11 (08-31-21 @ 07:00) (10 - 31)  SpO2: 97% (08-31-21 @ 07:00) (91% - 100%)  Wt(kg): --  Height (cm): 160 (08-29-21 @ 18:25)  Weight (kg): 81 (08-29-21 @ 18:25)  BMI (kg/m2): 31.6 (08-29-21 @ 18:25)  BSA (m2): 1.84 (08-29-21 @ 18:25)      08-30-21 @ 07:01  -  08-31-21 @ 07:00  --------------------------------------------------------  IN: 5615.7 mL / OUT: 3696 mL / NET: 1919.7 mL        Physical Exam:  	Gen: NAD  	HEENT: MMM  	Pulm: CTA B/L  	CV: S1S2  	Abd: Soft, +BS   	Ext: + LE edema B/L  	Neuro: Awake but somnolent   	Skin: Warm and dry, jaundiced  	Vascular access:      LABS/STUDIES  --------------------------------------------------------------------------------              7.4    18.86 >-----------<  90       [08-31-21 @ 00:40]              21.1     125  |  88  |  58  ----------------------------<  183      [08-31-21 @ 04:12]  4.1   |  16  |  4.74        Ca     8.3     [08-31-21 @ 04:12]      Mg     2.3     [08-31-21 @ 04:12]      Phos  2.6     [08-31-21 @ 04:12]    TPro  6.4  /  Alb  3.5  /  TBili  22.8  /  DBili  x   /  AST  144  /  ALT  84  /  AlkPhos  189  [08-31-21 @ 04:12]    PT/INR: PT 19.8 , INR 1.69       [08-29-21 @ 21:01]  PTT: 94.8       [08-31-21 @ 06:08]    Serum Osmolality 317      [08-30-21 @ 04:16]    Creatinine Trend:  SCr 4.74 [08-31 @ 04:12]  SCr 5.46 [08-31 @ 00:40]  SCr 7.77 [08-30 @ 15:51]  SCr 7.38 [08-30 @ 12:23]  SCr 8.21 [08-30 @ 08:16]    Urinalysis - [08-30-21 @ 00:21]      Color Dark Yellow / Appearance Turbid / SG 1.018 / pH 5.5      Gluc Negative / Ketone Negative  / Bili Moderate / Urobili 2 mg/dL       Blood Large / Protein 100 / Leuk Est Large / Nitrite Negative      RBC 5 /  / Hyaline 9 / Gran  / Sq Epi  / Non Sq Epi 1 / Bacteria Moderate    Urine Sodium 20      [08-30-21 @ 00:22]  Urine Potassium 23      [08-30-21 @ 00:22]  Urine Osmolality 340      [08-30-21 @ 00:22]    Iron 119, TIBC 158, %sat 75      [08-29-21 @ 23:03]  Ferritin 1586      [08-29-21 @ 23:03]  TSH 1.38      [08-29-21 @ 23:03]    HBsAg Nonreact      [08-29-21 @ 23:26]  HCV 1.08, Weakly Reactive Hepatitis C AB  S/CO Ratio                        Interpretation  < 1.00                                   Non-Reactive  1.00 - 4.99                         Weakly-Reactive  >= 5.00                                Reactive  Non-Reactive: A person with a non-reactive HCV antibody result is  considered uninfected.  No further action is needed unless recent  infection is suspected.  In these cases, consider repeat testing later to  detect seroconversion..  Weakly-Reactive: HCV antibody test is abnormal, HCV RNA Qualitative test  will follow.  Reactive: HCV antibody test is abnormal, HCV RNA Qualitative test will  follow.  Note: HCV antibody testing is performed on the [employer] system.      [08-29-21 @ 23:26]

## 2021-08-31 NOTE — PROGRESS NOTE ADULT - ATTENDING COMMENTS
JULIANA ATN from HRS vs Bilirubin cast nephropathy  Tolerating CRRT  Stop D5W  Na can continue to correct  Monitor SNa and trend  If we see signs of excess volume can start to UF with CRRT    Nupur Carreno MD  Off: [phone]  Cell: [phone]

## 2021-08-31 NOTE — PROGRESS NOTE ADULT - ASSESSMENT
57 yo f pmhx ETOH abuse (last drink ~2 weeks ago), ETOH cirrhosis c/b ascites, splenomegaly, IBS who was transferred from [place] ICU 8/29. She initially presented to [place] ED from home after being sent in by her GI Dr. López for abnl labs.  Per patient over the last week she has noticed her sclera/skin to be bright orange, fatigued, lightheaded, nausea, poor appetite and abdominal bloating. In [place] ED, patient found to be grossly jaundiced, labs significant for Na 114, BUN/Cr 146/16.3, serum co2 14, tbili 27.1, ast/alt 205/113. While in [place] ICU, pt was treated for alcoholic hepatitis s/p solumedrol, hepatorenal syndrome s/p octreotide, albumin, and acute renal failure. Pt was seen by nephrology, who recommended transfer to tertiary care center for initiation of CVVH. Hyponatremia was treated with NS. Pt was also maintained on NaHCO3 drip for metabolic acidosis. Lactulose was titrated to 20mg TID. Spironolactone was not given due to ARF in s/o HRS. GI was consulted for alcoholic hepatitis as well as possible liver transplant eval, which also agreed to transfer to tertiary center for further evaluation. An ICU to ICU transfer was initiated and pt was transferred to [facility] 8/29 and was started on CVVHDF. Hepatology was consulted due to decompensated ETOH cirrhosis.     Impression:  #Decompensated ETOH cirrhosis- MELDNa 39 (on CVVHDF); MDF 58.5 (poor prognosis)  -Ascites: no ascites on US abd 8/28  -Varices: Last EGD 04/2019 w/o EV  -HCC: no focal lesions seen on US abd +doppler 8/28  -HE: oriented to person on exam, +asterixis on exam  #UTI- UA with +WBC>150 and +LE: pending urine cx    Recommendations:  - check Hep B surface Ag, Hep B core Ab, Hep A IgM, Hep C Ab, Hep E IgM  - check serum FARHANA, smooth muscle Ab, anti-LKM-1 Ab, alpha-1 antitrypsin, ferritin, ceruloplasmin, copper, immunoglobulin panel (IgG, IgA, IgM)  - rifaxamin 550 mg BID, start lactulose 10 mg TID; titrate to goal 3-4 BM/day  - f/u U cx; can consider starting empiric abx for UTI while awaiting urine cx  - continue hep gtt due to suspected PVT seen on US doppler this admission  - recommend CT A/P with cont triple phase to evaluate for PVTs  - obtain TTE  - continue albumin 25% 50 mL q6h; can continue octreotide gtt for now  - consider consult IR for diagnostic paracentesis, though no ascites on abdominal US  - trend INR, platelet and CMP daily  - Will call pt's OP GI Dr. Williams López to obtain additional information    We will continue to follow.    Berenice Rincon MD  GI Fellow, PGY-4  Available via [employer] Teams    NON-URGENT CONSULTS:  Please email [email or web address]  AT NIGHT AND ON WEEKENDS:  Contact on-call GI fellow via answering service ([phone]) from 5pm-8am and on weekends/holidays  MONDAY-FRIDAY 8AM-5PM:  Pager# [phone] ([facility]) or [phone] ([facility])  GI Phone# [phone] ([facility])   55 yo f pmhx ETOH abuse (last drink ~2 weeks ago), ETOH cirrhosis c/b ascites, splenomegaly, IBS who was transferred from [place] ICU 8/29. She initially presented to [place] ED from home after being sent in by her GI Dr. López for abnl labs.  Per patient over the last week she has noticed her sclera/skin to be bright orange, fatigued, lightheaded, nausea, poor appetite and abdominal bloating. In [place] ED, patient found to be grossly jaundiced, labs significant for Na 114, BUN/Cr 146/16.3, serum co2 14, tbili 27.1, ast/alt 205/113. While in [place] ICU, pt was treated for alcoholic hepatitis s/p solumedrol, hepatorenal syndrome s/p octreotide, albumin, and acute renal failure. Pt was seen by nephrology, who recommended transfer to tertiary care center for initiation of CVVH. Hyponatremia was treated with NS. Pt was also maintained on NaHCO3 drip for metabolic acidosis. Lactulose was titrated to 20mg TID. Spironolactone was not given due to ARF in s/o HRS. GI was consulted for alcoholic hepatitis as well as possible liver transplant eval, which also agreed to transfer to tertiary center for further evaluation. An ICU to ICU transfer was initiated and pt was transferred to [facility] 8/29 and was started on CVVHDF. Hepatology was consulted due to decompensated ETOH cirrhosis.     Impression:  #Decompensated ETOH cirrhosis- MELDNa 39 (on CVVHDF); MDF 58.5 (poor prognosis); HAV/HBV neg  -Ascites: no ascites on US abd 8/28  -Varices: Last EGD 04/2019 w/o EV  -HCC: no focal lesions seen on US abd +doppler 8/28  -HE: oriented to person on exam, +asterixis on exam  #UTI- UA with +WBC>150 and +LE: pending urine cx  #+HCV Ab- pending RNA PCR    Recommendations:  - f/u Hep C RNA PCR, Hep E IgM  - check serum FARHANA, smooth muscle Ab, anti-LKM-1 Ab, alpha-1 antitrypsin, ferritin, ceruloplasmin, copper, immunoglobulin panel (IgG, IgA, IgM)  - rifaxamin 550 mg BID, increase lactulose 20 mg q1h; titrate to goal 3-4 BM/day  - f/u U cx; can consider starting empiric abx for UTI while awaiting urine cx  - stop hep gtt, no e/o PVT on CT AP this admission  - stop octreotide gtt  - recommend CT A/P with cont triple phase to evaluate for PVTs  - obtain TTE  - continue albumin 25% 50 mL q6h  - consider consult IR for diagnostic paracentesis  - trend INR, platelet and CMP daily  - Will call pt's OP GI Dr. Williams López to obtain additional information    We will continue to follow.    Berenice Rincon MD  GI Fellow, PGY-4  Available via [employer] Teams    NON-URGENT CONSULTS:  Please email [email or web address]  AT NIGHT AND ON WEEKENDS:  Contact on-call GI fellow via answering service ([phone]) from 5pm-8am and on weekends/holidays  MONDAY-FRIDAY 8AM-5PM:  Pager# [phone] ([facility]) or [phone] ([facility])  GI Phone# [phone] ([facility])   57 yo f pmhx ETOH abuse (last drink ~2 weeks ago), ETOH cirrhosis c/b ascites, splenomegaly, IBS who was transferred from [place] ICU 8/29. She initially presented to [place] ED from home after being sent in by her GI Dr. López for abnl labs.  Per patient over the last week she has noticed her sclera/skin to be bright orange, fatigued, lightheaded, nausea, poor appetite and abdominal bloating. In [place] ED, patient found to be grossly jaundiced, labs significant for Na 114, BUN/Cr 146/16.3, serum co2 14, tbili 27.1, ast/alt 205/113. While in [place] ICU, pt was treated for alcoholic hepatitis s/p solumedrol, hepatorenal syndrome s/p octreotide, albumin, and acute renal failure. Pt was seen by nephrology, who recommended transfer to tertiary care center for initiation of CVVH. Hyponatremia was treated with NS. Pt was also maintained on NaHCO3 drip for metabolic acidosis. Lactulose was titrated to 20mg TID. Spironolactone was not given due to ARF in s/o HRS. GI was consulted for alcoholic hepatitis as well as possible liver transplant eval, which also agreed to transfer to tertiary center for further evaluation. An ICU to ICU transfer was initiated and pt was transferred to [facility] 8/29 and was started on CVVHDF. Hepatology was consulted due to decompensated ETOH cirrhosis.     Impression:  #Decompensated ETOH cirrhosis- MELDNa 39 (on CVVHDF); MDF 58.5 (poor prognosis); HAV/HBV neg  -Ascites: no ascites on US abd 8/28  -Varices: Last EGD 04/2019 w/o EV  -HCC: no focal lesions seen on US abd +doppler 8/28  -HE: oriented to person on exam, +asterixis on exam  #UTI- UA with +WBC>150 and +LE: pending urine cx  #+HCV Ab- pending RNA PCR    Recommendations:  - f/u Hep C RNA PCR, Hep E IgM  - check serum FARHANA, smooth muscle Ab, anti-LKM-1 Ab, alpha-1 antitrypsin, ferritin, ceruloplasmin, copper, immunoglobulin panel (IgG, IgA, IgM)  - rifaxamin 550 mg BID, increase lactulose 20 mg q1h; titrate to goal 3-4 BM/day  - f/u U cx; can consider starting empiric abx for UTI while awaiting urine cx  - stop hep gtt, no e/o PVT on CT AP this admission  - stop octreotide gtt  - recommend CT A/P with cont triple phase to evaluate for PVTs  - obtain TTE  - continue albumin 25% 50 mL q6h  - consult IR for diagnostic paracentesis (would need COVID Abbott rapid test)  - trend INR, platelet and CMP daily  - Will call pt's OP GI Dr. Williams López to obtain additional information    We will continue to follow.    Berenice Rincon MD  GI Fellow, PGY-4  Available via [employer] Teams    NON-URGENT CONSULTS:  Please email [email or web address]  AT NIGHT AND ON WEEKENDS:  Contact on-call GI fellow via answering service ([phone]) from 5pm-8am and on weekends/holidays  MONDAY-FRIDAY 8AM-5PM:  Pager# [phone] ([facility]) or [phone] ([facility])  GI Phone# [phone] ([facility])

## 2021-08-31 NOTE — DIETITIAN INITIAL EVALUATION ADULT. - PHYSCIAL ASSESSMENT
Skin: no pressure injuries nutrition focused physical exam deferred as pt sleeping, visually well nourished/well nourished

## 2021-09-01 NOTE — PROGRESS NOTE ADULT - ATTENDING COMMENTS
# JULIANA/ ATN - Current on CRRT. Etiology of JULIANA secondary to ATN from bilirubin cast nephropathy or hepatorenal syndrome.  No signs of renal recovery yet. Continue CRRT.     # Hyponatremia - multifactorial . Secondary to volume overload in the setting of oliguric JULIANA. Serum sodium has increased gradually with CRRT. Latest sodium is appropriate at 135 today.     # anemia - Etiology of Anemia unclear. ? blood loss? Follow up iron and B12 levels.     # Metabolic acidosis - secondary to uremia. Continue CRRT.     The rest of the recommendations as per fellow's note.    Gracie Ho MD  Attending Nephrologist  [phone]

## 2021-09-01 NOTE — PROGRESS NOTE ADULT - ATTENDING COMMENTS
Patient seen and examined. Patient critically ill requiring frequent bedside visits with therapy change. Patient is a 56F EtOH abuse, cirrhosis/portal HTN, IBS, and HLD who is transferred from [facility] for liver and renal dysfunction. She was admitted to MICU for concern of hepatorenal syndrome and initiation of CRRT.    1. Shock state - in the setting of hepatorenal syndrome will aim for a MAP > 70 with Levophed assistance and add Midodrine. Will d/w GI regarding further need for MAP elevation.  - Continue Ceftriaxone for E. coli UTI and followup sensitivities  2. Acute renal Failure - likely hepatorenal now on CRRT. Will continue net even. Nephrology followup. Serial BMP. Will need to d/w Nephro regarding long term plan for CRRT vs eventual transition to HD  - Hyponatremia improved  3. Acute Liver failure - patient with elevated bilirubin in the setting of known EtOh use. Hepatology evaluation. Will continue NAC x 5 days. Will hold off steroids at this time per d/w Hepatology.  - US with possible portal vein clots - followup official CT abdomen/pelvis with patent portal vessesl - no AC  - HCV weakly positive  - cont Albumin  - Start Ursodiol  - Asterixis - Lactulose and Rifaximin - NGT placed and patient able to get feeds/meds  - prior EGD without evidence of varices 2019  - Small ascites without safe pocket for bedside paracentesis  4. Pulmonary - stable and on 2L O2. RVP with adenovirus  5. Infectious Disease - followup cultures and Continue abx  6. Encephalopathy - due to liver disease and anxiety. Continue precedex as needed.    Long term prognosis guarded. Nephrology and Hepatology followup.

## 2021-09-01 NOTE — PROGRESS NOTE ADULT - SUBJECTIVE AND OBJECTIVE BOX
MICU PROGRESS NOTE    INTERVAL HPI/OVERNIGHT EVENTS:    SUBJECTIVE:     OBJECTIVE:    VITAL SIGNS:  ICU Vital Signs Last 24 Hrs  T(C): 36.9 (01 Sep 2021 05:00), Max: 37.4 (31 Aug 2021 20:00)  T(F): 98.4 (01 Sep 2021 05:00), Max: 99.3 (31 Aug 2021 20:00)  HR: 101 (01 Sep 2021 06:45) (73 - 120)  BP: 145/71 (01 Sep 2021 06:45) (79/42 - 151/64)  BP(mean): 102 (01 Sep 2021 06:45) (57 - 102)  ABP: --  ABP(mean): --  RR: 16 (01 Sep 2021 06:45) (6 - 33)  SpO2: 98% (01 Sep 2021 06:45) (94% - 99%)      VENTS:      I&O:    08-31 @ 07:01  -  09-01 @ 07:00  --------------------------------------------------------  IN: 3834.5 mL / OUT: 3488 mL / NET: 346.5 mL        PHYSICAL EXAM:  GENERAL: NAD, conversant  CHEST/LUNG: Clear to auscultation bilaterally; No crackles, rhonchi, wheezing, or rubs  HEART: Regular rate and rhythm; No murmurs, rubs, or gallops  ABDOMEN: Soft, Nontender, Nondistended; Bowel sounds present  EXTREMITIES:  2+ Peripheral Pulses, No clubbing, cyanosis, or edema  SKIN: No rashes or lesions  NERVOUS SYSTEM:  Alert & Oriented, Good concentration      LINES:                                       MEDICATIONS:  MEDICATIONS  (STANDING):  acetylcysteine IVPB 12 Gram(s) IV Intermittent <User Schedule>  albumin human 25% IVPB 50 milliLiter(s) IV Intermittent every 6 hours  cefTRIAXone   IVPB      cefTRIAXone   IVPB 1000 milliGRAM(s) IV Intermittent every 24 hours  chlorhexidine 4% Liquid 1 Application(s) Topical <User Schedule>  chlorhexidine 4% Liquid 1 Application(s) Topical <User Schedule>  CRRT Treatment    <Continuous>  dexMEDEtomidine Infusion 0.2 MICROgram(s)/kG/Hr (4.05 mL/Hr) IV Continuous <Continuous>  dextrose 40% Gel 15 Gram(s) Oral once  dextrose 50% Injectable 25 Gram(s) IV Push once  dextrose 50% Injectable 12.5 Gram(s) IV Push once  dextrose 50% Injectable 25 Gram(s) IV Push once  folic acid Injectable 1 milliGRAM(s) IV Push daily  glucagon  Injectable 1 milliGRAM(s) IntraMuscular once  heparin   Injectable 5000 Unit(s) SubCutaneous every 8 hours  lactulose Syrup 20 Gram(s) Oral every 6 hours  norepinephrine Infusion 0.05 MICROgram(s)/kG/Min (7.59 mL/Hr) IV Continuous <Continuous>  PrismaSATE Dialysate BGK 4 / 2.5 5000 milliLiter(s) (900 mL/Hr) CRRT <Continuous>  PrismaSOL Filtration BGK 4 / 2.5 5000 milliLiter(s) (700 mL/Hr) CRRT <Continuous>  PrismaSOL Filtration BGK 4 / 2.5 5000 milliLiter(s) (200 mL/Hr) CRRT <Continuous>  rifAXIMin 550 milliGRAM(s) Oral two times a day  thiamine IVPB 500 milliGRAM(s) IV Intermittent every 8 hours    MEDICATIONS  (PRN):  sodium chloride 0.9% lock flush 10 milliLiter(s) IV Push every 1 hour PRN Pre/post blood products, medications, blood draw, and to maintain line patency      ALLERGIES:  Allergies    No Known Allergies    Intolerances        LABS:                        7.3    24.91 )-----------( 67       ( 01 Sep 2021 01:46 )             21.3     09-01    133<L>  |  97  |  28<H>  ----------------------------<  181<H>  4.4   |  18<L>  |  2.37<H>    Ca    9.3      01 Sep 2021 01:46  Phos  3.0     09-01  Mg     2.4     09-01    TPro  6.5  /  Alb  3.9  /  TBili  23.6<H>  /  DBili  x   /  AST  173<H>  /  ALT  87<H>  /  AlkPhos  184<H>  09-01    PT/INR - ( 01 Sep 2021 01:46 )   PT: 26.0 sec;   INR: 2.25 ratio         PTT - ( 01 Sep 2021 01:46 )  PTT:41.4 sec      CAPILLARY BLOOD GLUCOSE          RADIOLOGY & ADDITIONAL TESTS: Reviewed. MICU PROGRESS NOTE    INTERVAL HPI/OVERNIGHT EVENTS:  O/n potassium and phos repleted. 4BMs overnight.    SUBJECTIVE:   States "Hey" with sternal rub, but otherwise does not answer questions.    OBJECTIVE:    VITAL SIGNS:  ICU Vital Signs Last 24 Hrs  T(C): 36.9 (01 Sep 2021 05:00), Max: 37.4 (31 Aug 2021 20:00)  T(F): 98.4 (01 Sep 2021 05:00), Max: 99.3 (31 Aug 2021 20:00)  HR: 101 (01 Sep 2021 06:45) (73 - 120)  BP: 145/71 (01 Sep 2021 06:45) (79/42 - 151/64)  BP(mean): 102 (01 Sep 2021 06:45) (57 - 102)  ABP: --  ABP(mean): --  RR: 16 (01 Sep 2021 06:45) (6 - 33)  SpO2: 98% (01 Sep 2021 06:45) (94% - 99%)      VENTS:      I&O:    08-31 @ 07:01  -  09-01 @ 07:00  --------------------------------------------------------  IN: 3834.5 mL / OUT: 3488 mL / NET: 346.5 mL        PHYSICAL EXAM:  GENERAL: sleeping, lethargic, Jaundice  HEENT: scleral icterus  CHEST/LUNG: Clear to auscultation bilaterally; No crackles, rhonchi, wheezing, or rubs  HEART: Regular rate and rhythm; No murmurs, rubs, or gallops  ABDOMEN: Soft, Nontender, +distended; Bowel sounds present  EXTREMITIES:  no edema  SKIN: No rashes or lesions  NERVOUS SYSTEM: aaox0 lethargic, states "hey"  LINES:        PANTERA Barone central line                               MEDICATIONS:  MEDICATIONS  (STANDING):  acetylcysteine IVPB 12 Gram(s) IV Intermittent <User Schedule>  albumin human 25% IVPB 50 milliLiter(s) IV Intermittent every 6 hours  cefTRIAXone   IVPB      cefTRIAXone   IVPB 1000 milliGRAM(s) IV Intermittent every 24 hours  chlorhexidine 4% Liquid 1 Application(s) Topical <User Schedule>  chlorhexidine 4% Liquid 1 Application(s) Topical <User Schedule>  CRRT Treatment    <Continuous>  dexMEDEtomidine Infusion 0.2 MICROgram(s)/kG/Hr (4.05 mL/Hr) IV Continuous <Continuous>  dextrose 40% Gel 15 Gram(s) Oral once  dextrose 50% Injectable 25 Gram(s) IV Push once  dextrose 50% Injectable 12.5 Gram(s) IV Push once  dextrose 50% Injectable 25 Gram(s) IV Push once  folic acid Injectable 1 milliGRAM(s) IV Push daily  glucagon  Injectable 1 milliGRAM(s) IntraMuscular once  heparin   Injectable 5000 Unit(s) SubCutaneous every 8 hours  lactulose Syrup 20 Gram(s) Oral every 6 hours  norepinephrine Infusion 0.05 MICROgram(s)/kG/Min (7.59 mL/Hr) IV Continuous <Continuous>  PrismaSATE Dialysate BGK 4 / 2.5 5000 milliLiter(s) (900 mL/Hr) CRRT <Continuous>  PrismaSOL Filtration BGK 4 / 2.5 5000 milliLiter(s) (700 mL/Hr) CRRT <Continuous>  PrismaSOL Filtration BGK 4 / 2.5 5000 milliLiter(s) (200 mL/Hr) CRRT <Continuous>  rifAXIMin 550 milliGRAM(s) Oral two times a day  thiamine IVPB 500 milliGRAM(s) IV Intermittent every 8 hours    MEDICATIONS  (PRN):  sodium chloride 0.9% lock flush 10 milliLiter(s) IV Push every 1 hour PRN Pre/post blood products, medications, blood draw, and to maintain line patency      ALLERGIES:  Allergies    No Known Allergies    Intolerances        LABS:                        7.3    24.91 )-----------( 67       ( 01 Sep 2021 01:46 )             21.3     09-01    133<L>  |  97  |  28<H>  ----------------------------<  181<H>  4.4   |  18<L>  |  2.37<H>    Ca    9.3      01 Sep 2021 01:46  Phos  3.0     09-01  Mg     2.4     09-01    TPro  6.5  /  Alb  3.9  /  TBili  23.6<H>  /  DBili  x   /  AST  173<H>  /  ALT  87<H>  /  AlkPhos  184<H>  09-01    PT/INR - ( 01 Sep 2021 01:46 )   PT: 26.0 sec;   INR: 2.25 ratio         PTT - ( 01 Sep 2021 01:46 )  PTT:41.4 sec      CAPILLARY BLOOD GLUCOSE          RADIOLOGY & ADDITIONAL TESTS: Reviewed.

## 2021-09-01 NOTE — PROGRESS NOTE ADULT - SUBJECTIVE AND OBJECTIVE BOX
Chief Complaint:  Patient is a 56y old  Female who presents with a chief complaint of CRRT (01 Sep 2021 07:47)      Interval Events:       Hospital Medications:  acetylcysteine IVPB 12 Gram(s) IV Intermittent <User Schedule>  albumin human 25% IVPB 50 milliLiter(s) IV Intermittent every 6 hours  cefTRIAXone   IVPB      cefTRIAXone   IVPB 1000 milliGRAM(s) IV Intermittent every 24 hours  chlorhexidine 4% Liquid 1 Application(s) Topical <User Schedule>  chlorhexidine 4% Liquid 1 Application(s) Topical <User Schedule>  CRRT Treatment    <Continuous>  dexMEDEtomidine Infusion 0.2 MICROgram(s)/kG/Hr IV Continuous <Continuous>  dextrose 40% Gel 15 Gram(s) Oral once  dextrose 50% Injectable 25 Gram(s) IV Push once  dextrose 50% Injectable 12.5 Gram(s) IV Push once  dextrose 50% Injectable 25 Gram(s) IV Push once  folic acid Injectable 1 milliGRAM(s) IV Push daily  glucagon  Injectable 1 milliGRAM(s) IntraMuscular once  heparin   Injectable 5000 Unit(s) SubCutaneous every 8 hours  lactulose Syrup 20 Gram(s) Oral every 6 hours  norepinephrine Infusion 0.05 MICROgram(s)/kG/Min IV Continuous <Continuous>  PrismaSATE Dialysate BGK 4 / 2.5 5000 milliLiter(s) CRRT <Continuous>  PrismaSOL Filtration BGK 4 / 2.5 5000 milliLiter(s) CRRT <Continuous>  PrismaSOL Filtration BGK 4 / 2.5 5000 milliLiter(s) CRRT <Continuous>  rifAXIMin 550 milliGRAM(s) Oral two times a day  sodium chloride 0.9% lock flush 10 milliLiter(s) IV Push every 1 hour PRN  thiamine IVPB 500 milliGRAM(s) IV Intermittent every 8 hours      PMHX/PSHX:  Anxiety    GERD (gastroesophageal reflux disease)    Constipation    Fatty liver    IBS (irritable bowel syndrome)    Liver cirrhosis    HLD (hyperlipidemia)    Claustrophobia    ETOH abuse    Lipoma    H/O colonoscopy    H/O endoscopy    Lipoma of back        ROS: unable to obtain as pt was somnolent        PHYSICAL EXAM:     GENERAL:  Appears stated age, +somnolent  HEENT:  NC/AT, eyes closed  CHEST:  Full & symmetric excursion, no increased effort, breath sounds clear  HEART:  Regular rhythm, S1, S2, no murmur/rub/S3/S4  ABDOMEN:  Soft, non-tender, non-distended, normoactive bowel sounds  EXTREMITIES:  1+ BL LE edema  SKIN:  +jaundiced  NEURO:  +somnolent and difficult to arouse, on sedation      Vital Signs:  Vital Signs Last 24 Hrs  T(C): 36.9 (01 Sep 2021 05:00), Max: 37.4 (31 Aug 2021 20:00)  T(F): 98.4 (01 Sep 2021 05:00), Max: 99.3 (31 Aug 2021 20:00)  HR: 101 (01 Sep 2021 06:45) (73 - 120)  BP: 145/71 (01 Sep 2021 06:45) (79/42 - 151/64)  BP(mean): 102 (01 Sep 2021 06:45) (57 - 102)  RR: 16 (01 Sep 2021 06:45) (6 - 33)  SpO2: 98% (01 Sep 2021 06:45) (94% - 99%)  Daily     Daily     LABS:                        7.3    24.91 )-----------( 67       ( 01 Sep 2021 01:46 )             21.3     09-01    133<L>  |  97  |  28<H>  ----------------------------<  181<H>  4.4   |  18<L>  |  2.37<H>    Ca    9.3      01 Sep 2021 01:46  Phos  3.0     09-01  Mg     2.4     09-01    TPro  6.5  /  Alb  3.9  /  TBili  23.6<H>  /  DBili  x   /  AST  173<H>  /  ALT  87<H>  /  AlkPhos  184<H>  09-01    LIVER FUNCTIONS - ( 01 Sep 2021 01:46 )  Alb: 3.9 g/dL / Pro: 6.5 g/dL / ALK PHOS: 184 U/L / ALT: 87 U/L / AST: 173 U/L / GGT: x           PT/INR - ( 01 Sep 2021 01:46 )   PT: 26.0 sec;   INR: 2.25 ratio         PTT - ( 01 Sep 2021 01:46 )  PTT:41.4 sec    Amylase Serum--      Lipase serum--       [id number]      Imaging:             Chief Complaint:  Patient is a 56y old  Female who presents with a chief complaint of CRRT (01 Sep 2021 07:47)      Interval Events: Still on precedex for agitation, reportedly had ~10 BM in the last 24H. No significant UOP.      Hospital Medications:  acetylcysteine IVPB 12 Gram(s) IV Intermittent <User Schedule>  albumin human 25% IVPB 50 milliLiter(s) IV Intermittent every 6 hours  cefTRIAXone   IVPB      cefTRIAXone   IVPB 1000 milliGRAM(s) IV Intermittent every 24 hours  chlorhexidine 4% Liquid 1 Application(s) Topical <User Schedule>  chlorhexidine 4% Liquid 1 Application(s) Topical <User Schedule>  CRRT Treatment    <Continuous>  dexMEDEtomidine Infusion 0.2 MICROgram(s)/kG/Hr IV Continuous <Continuous>  dextrose 40% Gel 15 Gram(s) Oral once  dextrose 50% Injectable 25 Gram(s) IV Push once  dextrose 50% Injectable 12.5 Gram(s) IV Push once  dextrose 50% Injectable 25 Gram(s) IV Push once  folic acid Injectable 1 milliGRAM(s) IV Push daily  glucagon  Injectable 1 milliGRAM(s) IntraMuscular once  heparin   Injectable 5000 Unit(s) SubCutaneous every 8 hours  lactulose Syrup 20 Gram(s) Oral every 6 hours  norepinephrine Infusion 0.05 MICROgram(s)/kG/Min IV Continuous <Continuous>  PrismaSATE Dialysate BGK 4 / 2.5 5000 milliLiter(s) CRRT <Continuous>  PrismaSOL Filtration BGK 4 / 2.5 5000 milliLiter(s) CRRT <Continuous>  PrismaSOL Filtration BGK 4 / 2.5 5000 milliLiter(s) CRRT <Continuous>  rifAXIMin 550 milliGRAM(s) Oral two times a day  sodium chloride 0.9% lock flush 10 milliLiter(s) IV Push every 1 hour PRN  thiamine IVPB 500 milliGRAM(s) IV Intermittent every 8 hours      PMHX/PSHX:  Anxiety    GERD (gastroesophageal reflux disease)    Constipation    Fatty liver    IBS (irritable bowel syndrome)    Liver cirrhosis    HLD (hyperlipidemia)    Claustrophobia    ETOH abuse    Lipoma    H/O colonoscopy    H/O endoscopy    Lipoma of back        ROS: unable to obtain as pt was somnolent        PHYSICAL EXAM:     GENERAL:  Appears stated age, +somnolent  HEENT:  NC/AT, eyes closed  CHEST:  Full & symmetric excursion, no increased effort, breath sounds clear  HEART:  Regular rhythm, S1, S2, no murmur/rub/S3/S4  ABDOMEN:  Soft, non-tender, non-distended, normoactive bowel sounds  EXTREMITIES:  1+ BL LE edema  SKIN:  +jaundiced  NEURO:  +somnolent and difficult to arouse, on sedation      Vital Signs:  Vital Signs Last 24 Hrs  T(C): 36.9 (01 Sep 2021 05:00), Max: 37.4 (31 Aug 2021 20:00)  T(F): 98.4 (01 Sep 2021 05:00), Max: 99.3 (31 Aug 2021 20:00)  HR: 101 (01 Sep 2021 06:45) (73 - 120)  BP: 145/71 (01 Sep 2021 06:45) (79/42 - 151/64)  BP(mean): 102 (01 Sep 2021 06:45) (57 - 102)  RR: 16 (01 Sep 2021 06:45) (6 - 33)  SpO2: 98% (01 Sep 2021 06:45) (94% - 99%)  Daily     Daily     LABS:                        7.3    24.91 )-----------( 67       ( 01 Sep 2021 01:46 )             21.3     09-01    133<L>  |  97  |  28<H>  ----------------------------<  181<H>  4.4   |  18<L>  |  2.37<H>    Ca    9.3      01 Sep 2021 01:46  Phos  3.0     09-01  Mg     2.4     09-01    TPro  6.5  /  Alb  3.9  /  TBili  23.6<H>  /  DBili  x   /  AST  173<H>  /  ALT  87<H>  /  AlkPhos  184<H>  09-01    LIVER FUNCTIONS - ( 01 Sep 2021 01:46 )  Alb: 3.9 g/dL / Pro: 6.5 g/dL / ALK PHOS: 184 U/L / ALT: 87 U/L / AST: 173 U/L / GGT: x           PT/INR - ( 01 Sep 2021 01:46 )   PT: 26.0 sec;   INR: 2.25 ratio         PTT - ( 01 Sep 2021 01:46 )  PTT:41.4 sec    Amylase Serum--      Lipase serum--       [id number]      Imaging:      EXAM:  US ABDOMEN LIMITED                            PROCEDURE DATE:  09/01/2021            INTERPRETATION:  CLINICAL INFORMATION: Decompensated alcoholic cirrhosis. Assess for ascites.    COMPARISON: CT abdomen and pelvis 8/30/2021..    TECHNIQUE:Grayscale sonographic images of the 4 quadrants were obtained.    FINDINGS/  IMPRESSION:  Small ascites in the right upper quadrant, right lower quadrant and left lower quadrant. No ascites in the left upper quadrant. Largest pockets are in the bilateral lower quadrants.    Incidental note of cirrhosis and splenomegaly.

## 2021-09-01 NOTE — PROGRESS NOTE ADULT - ASSESSMENT
57 yo f pmhx ETOH abuse (last drink ~2 weeks ago), ETOH cirrhosis c/b ascites, splenomegaly, IBS who was transferred from [facility] ICU 8/29. She initially presented to [facility] ED from home after being sent in by her GI Dr. López for abnl labs.  Per patient over the last week she has noticed her sclera/skin to be bright orange, fatigued, lightheaded, nausea, poor appetite and abdominal bloating. In [facility] ED, patient found to be grossly jaundiced, labs significant for Na 114, BUN/Cr 146/16.3, serum co2 14, tbili 27.1, ast/alt 205/113. While in [facility] ICU, pt was treated for alcoholic hepatitis s/p solumedrol, hepatorenal syndrome s/p octreotide, albumin, and acute renal failure. Pt was seen by nephrology, who recommended transfer to tertiary care center for initiation of CVVH. Hyponatremia was treated with NS. Pt was also maintained on NaHCO3 drip for metabolic acidosis. Lactulose was titrated to 20mg TID. Spironolactone was not given due to ARF in s/o HRS. GI was consulted for alcoholic hepatitis as well as possible liver transplant eval, which also agreed to transfer to tertiary center for further evaluation. An ICU to ICU transfer was initiated and pt was transferred to [facility] 8/29 and was started on CVVHDF. Hepatology was consulted due to decompensated ETOH cirrhosis.     Impression:  #Decompensated ETOH cirrhosis- MELDNa 39 (on CVVHDF); MDF 58.5 (poor prognosis); HAV/HBV neg  -Ascites: no ascites on US abd 8/28  -Varices: Last EGD 04/2019 w/o EV  -HCC: no focal lesions seen on US abd +doppler 8/28  -HE: oriented to person on exam, +asterixis on exam  #UTI- UA with +WBC>150 and +LE: pending urine cx  #+HCV Ab- pending RNA PCR    Recommendations:  - f/u Hep C RNA PCR, Hep E IgM  - check serum FARHANA, smooth muscle Ab, anti-LKM-1 Ab, alpha-1 antitrypsin, ferritin, ceruloplasmin, copper, immunoglobulin panel (IgG, IgA, IgM)  - rifaxamin 550 mg BID, increase lactulose 20 mg q1h; titrate to goal 3-4 BM/day  - f/u U cx; can consider starting empiric abx for UTI while awaiting urine cx  - stop hep gtt, no e/o PVT on CT AP this admission  - stop octreotide gtt  - recommend CT A/P with cont triple phase to evaluate for PVTs  - obtain TTE  - continue albumin 25% 50 mL q6h  - consult IR for diagnostic paracentesis (would need COVID Abbott rapid test)  - trend INR, platelet and CMP daily  - Will call pt's OP GI Dr. Williams López to obtain additional information    We will continue to follow.    Berenice Rincon MD  GI Fellow, PGY-4  Available via [employer] Teams    NON-URGENT CONSULTS:  Please email [email or web address]  AT NIGHT AND ON WEEKENDS:  Contact on-call GI fellow via answering service ([phone]) from 5pm-8am and on weekends/holidays  MONDAY-FRIDAY 8AM-5PM:  Pager# [phone] ([facility]) or [phone] ([facility])  GI Phone# [phone] ([facility])     55 yo f pmhx ETOH abuse (last drink ~2 weeks ago), ETOH cirrhosis c/b ascites, splenomegaly, IBS who was transferred from [place] ICU 8/29. She initially presented to [place] ED from home after being sent in by her GI Dr. López for abnl labs.  Per patient over the last week she has noticed her sclera/skin to be bright orange, fatigued, lightheaded, nausea, poor appetite and abdominal bloating. In [place] ED, patient found to be grossly jaundiced, labs significant for Na 114, BUN/Cr 146/16.3, serum co2 14, tbili 27.1, ast/alt 205/113. While in [place] ICU, pt was treated for alcoholic hepatitis s/p solumedrol, hepatorenal syndrome s/p octreotide, albumin, and acute renal failure. Pt was seen by nephrology, who recommended transfer to tertiary care center for initiation of CVVH. Hyponatremia was treated with NS. Pt was also maintained on NaHCO3 drip for metabolic acidosis. Lactulose was titrated to 20mg TID. Spironolactone was not given due to ARF in s/o HRS. GI was consulted for alcoholic hepatitis as well as possible liver transplant eval, which also agreed to transfer to tertiary center for further evaluation. An ICU to ICU transfer was initiated and pt was transferred to [facility] 8/29 and was started on CVVHDF. Hepatology was consulted due to decompensated ETOH cirrhosis.     Impression:  #Decompensated ETOH cirrhosis- MELDNa 39 (on CVVHDF); MDF 58.5 (poor prognosis); HAV/HBV neg  -Ascites: no ascites on US abd 8/28  -Varices: Last EGD 04/2019 w/o EV  -HCC: no focal lesions seen on US abd +doppler 8/28  -HE: oriented to person on exam, +asterixis on exam  #E coli UTI- UA with +WBC>150 and +LE: urine cx with >100K CFU E coli  #+HCV Ab- pending RNA PCR    Recommendations:  - f/u Hep C RNA PCR, Hep E IgM  - rifaxamin 550 mg BID, lactulose 20 mg q6h; titrate to goal 3-4 BM/day  - on CTX for E coli UTI (8/31-9/7)  - start midodrine 10 mg TID  - start ursodiol 500 mg BID  - c/w NAC (8/30-9/4)  Initiate N-acetylcysteine as a continuous infusion in the following steps  1) 150mg/kg in 250 mL 5% dextrose over 30 mins  2) 50mg/kg in 500 mL 5% dextrose solution over 4 hours  3) 100mg/kg in 1000 mL 5% dextrose solution over 16 hours  4) 100mg/kg in 1000 mL 5% dextrose solution over days 2-5  - obtain TTE  - continue albumin 25% 50 mL q6h  - consult IR for diagnostic paracentesis (would need COVID Abbott rapid test)  - trend INR, platelet and CMP daily  - Will call pt's OP GI Dr. Williams López to obtain additional information    We will continue to follow.    Berenice Rnicon MD  GI Fellow, PGY-4  Available via [employer] Teams    NON-URGENT CONSULTS:  Please email [email or web address]  AT NIGHT AND ON WEEKENDS:  Contact on-call GI fellow via answering service ([phone]) from 5pm-8am and on weekends/holidays  MONDAY-FRIDAY 8AM-5PM:  Pager# [phone] ([facility]) or [phone] ([facility])  GI Phone# [phone] ([facility])

## 2021-09-01 NOTE — PROGRESS NOTE ADULT - SUBJECTIVE AND OBJECTIVE BOX
[facility] DIVISION OF KIDNEY DISEASES AND HYPERTENSION -- FOLLOW UP NOTE  --------------------------------------------------------------------------------  Chief Complaint: 56-year-old Female with PMhx ETOH use disorder (last drink ~2 weeks ago), ETOH cirrhosis with ascites, splenomegaly, HLD, IBS, anxiety presented from [facility] to [facility] MICU on 8/29/21 for severe hyponatremia and JULIANA requiring CRRT. Nephrology consultation requested for JULIANA and hyponatremia.      24 hour events/subjective: Pt. seen and examined at bedside. Appears calm and minimally conversant.     PAST HISTORY  --------------------------------------------------------------------------------  No significant changes to PMH, PSH, FHx, SHx, unless otherwise noted    ALLERGIES & MEDICATIONS  --------------------------------------------------------------------------------  Allergies    No Known Allergies    Intolerances    Standing Inpatient Medications  acetylcysteine IVPB 12 Gram(s) IV Intermittent <User Schedule>  albumin human 25% IVPB 50 milliLiter(s) IV Intermittent every 6 hours  cefTRIAXone   IVPB      cefTRIAXone   IVPB 1000 milliGRAM(s) IV Intermittent every 24 hours  chlorhexidine 4% Liquid 1 Application(s) Topical <User Schedule>  chlorhexidine 4% Liquid 1 Application(s) Topical <User Schedule>  CRRT Treatment    <Continuous>  CRRT Treatment    <Continuous>  dexMEDEtomidine Infusion 0.2 MICROgram(s)/kG/Hr IV Continuous <Continuous>  dextrose 40% Gel 15 Gram(s) Oral once  dextrose 50% Injectable 25 Gram(s) IV Push once  dextrose 50% Injectable 12.5 Gram(s) IV Push once  dextrose 50% Injectable 25 Gram(s) IV Push once  folic acid Injectable 1 milliGRAM(s) IV Push daily  glucagon  Injectable 1 milliGRAM(s) IntraMuscular once  heparin   Injectable 5000 Unit(s) SubCutaneous every 8 hours  lactulose Syrup 20 Gram(s) Oral every 6 hours  norepinephrine Infusion 0.05 MICROgram(s)/kG/Min IV Continuous <Continuous>  PrismaSATE Dialysate BGK 4 / 2.5 5000 milliLiter(s) CRRT <Continuous>  PrismaSATE Dialysate BGK 4 / 2.5 5000 milliLiter(s) CRRT <Continuous>  PrismaSOL Filtration BGK 4 / 2.5 5000 milliLiter(s) CRRT <Continuous>  PrismaSOL Filtration BGK 4 / 2.5 5000 milliLiter(s) CRRT <Continuous>  PrismaSOL Filtration BGK 4 / 2.5 5000 milliLiter(s) CRRT <Continuous>  PrismaSOL Filtration BGK 4 / 2.5 5000 milliLiter(s) CRRT <Continuous>  rifAXIMin 550 milliGRAM(s) Oral two times a day  thiamine IVPB 500 milliGRAM(s) IV Intermittent every 8 hours    PRN Inpatient Medications  sodium chloride 0.9% lock flush 10 milliLiter(s) IV Push every 1 hour PRN    REVIEW OF SYSTEMS  --------------------------------------------------------------------------------  Limited as pt. is somnolent    VITALS/PHYSICAL EXAM  --------------------------------------------------------------------------------  T(C): 36.9 (09-01-21 @ 05:00), Max: 37.4 (08-31-21 @ 20:00)  HR: 101 (09-01-21 @ 06:45) (73 - 120)  BP: 145/71 (09-01-21 @ 06:45) (79/42 - 151/64)  RR: 16 (09-01-21 @ 06:45) (6 - 33)  SpO2: 98% (09-01-21 @ 06:45) (94% - 99%)  Wt(kg): --    08-31-21 @ 07:01  -  09-01-21 @ 07:00  --------------------------------------------------------  IN: 3834.5 mL / OUT: 3488 mL / NET: 346.5 mL    Physical Exam:  	Gen: NAD, appears lethargic  	HEENT: icetric  	Pulm: CTA B/L  	CV: S1S2+  	Abd: Soft, +BS   	Ext: B/L LE edema +  	Neuro: Awake but somnolent  	Skin: Warm and dry  	Vascular access:    LABS/STUDIES  --------------------------------------------------------------------------------              7.3    24.91 >-----------<  67       [09-01-21 @ 01:46]              21.3     135  |  100  |  21  ----------------------------<  189      [09-01-21 @ 08:37]  4.1   |  19  |  1.82        Ca     9.2     [09-01-21 @ 08:37]      Mg     2.4     [09-01-21 @ 08:37]      Phos  2.0     [09-01-21 @ 08:37]    TPro  6.5  /  Alb  3.9  /  TBili  23.6  /  DBili  x   /  AST  173  /  ALT  87  /  AlkPhos  184  [09-01-21 @ 01:46]    PT/INR: PT 26.0 , INR 2.25       [09-01-21 @ 01:46]  PTT: 41.4       [09-01-21 @ 01:46]    Creatinine Trend:  SCr 1.82 [09-01 @ 08:37]  SCr 2.37 [09-01 @ 01:46]  SCr 2.86 [08-31 @ 20:24]  SCr 3.36 [08-31 @ 14:57]  SCr 3.33 [08-31 @ 14:40]

## 2021-09-01 NOTE — CHART NOTE - NSCHARTNOTEFT_GEN_A_CORE
: MADIE Waters    INDICATION: Liver Failure    PROCEDURE:  [x ] LIMITED ABDOMINAL      FINDINGS: Small ascites, simple appearing, in lower quadrants with intervening bowel  Bladder with some urine      INTERPRETATION:  Small ascites with no safe window for bedside paracentesis    Images saved to [facility].    Attending Attestation:  I was present for the entire procedure and reviewed all images.

## 2021-09-01 NOTE — PROGRESS NOTE ADULT - ASSESSMENT
56 y.o. F with PMH of ETOH misuse, ETOH cirrhosis, HLD, IBS, anxiety, presents with decompensated alcoholic cirrhosis c/b HRS, likely contributing to acute renal failure and hyponatremia s/p now on CRRT, transferred to [facility] MICU, course c/b hepatic encephalopathy    Neuro  aaox0, lethargic    #Hepatic encephalopathy  -Restart lactulose 20q8, rifaximin if ok to put in NGT, will touch base with hep   -may need lactulose enema  -titrate to 4-5BM daily  -stool count    # ETOH Misuse  - Continue to monitor for w/d symptoms; pt reported last drink was 2.5 wks ago  - SW consult & cessation counseling   - c/w thiamine and folate     # Anxiety  - C/w Xanax PRN   - See ISTOP     Cardiovascular  #Hypotension - on levophed  - Maintain MAP goal >80 given HRS as per hepatology     Pulmonary  - Normal O2 Saturation on room air, monitor for volume overload in s/o ARF and hypervolemia   -monitor ability to protect airway    GI  # Hepatorenal syndrome   - See Renal     # ETOH Cirrhosis   - s/p solumedrol 40mg IV qD  -- discontinued as per hepatology  - Maddrey score: 52.4 indicative of poor prognosis  - MELD Score: 38 --?65-66% risk of 90 day mortality   - Pt anuric now, unclear if spironolactone will be helpful  - No evidence of ascites on U/S abd    - Trend liver labs daily CMP   -  NAC - started 8/30  [ ] will d/w hepatology re: ngt    Endoscopy in 2019: no varices, stomach with congestion c/w gastritis vs portal gastropathy    Negative Hep B surface Ag, Hep B core Ab, Hep A IgM  Weakly reactive Hep C Ab  [  ]Hep E IgM  [ ]check serum FARHANA, smooth muscle Ab, anti-LKM-1 Ab, alpha-1 antitrypsin, ferritin, ceruloplasmin, copper, immunoglobulin panel (IgG, IgA, IgM)      # Hep C   - F/u Hep C viral load  - Hep B negative  -Hep C weakly reactive    #R/o portal vein thrombosis  -hep gtt for now   [ ] triple phase CT A/P to r/o portal vein thrombosis -- prelim negative -- if final read negative will dc heparin gtt    Renal  # Hepatorenal syndrome  - Suspect acute renal failure 2/2 HRS in s/o alcoholic hepatitis and cirrhosis   - C/w octreotide, albumin gtt  - On CRRT  - Baseline Cr 2.5  - Monitor Cr and avoid nephrotoxic agents  - Renally dose meds   - MAP goal 80+  - Monitor I/Os     # Hyponatremia - 114 on presentation  - Euvolemic on exam; possibly 2/2 ARF and or poor oral intake in s/o ETOH use   - Angela 20, suggestive of decreased PO intake  - S/p NS boluses while at [place]  - Trend BMP Mg Phos q4   - Expect Na to improve as volume status improves with CRRT   - Avoid rapid correction, goal 6-8 mEq in 24 hrs   - Sodium stable at 125 --> discontinue D5, slowly raise sodium    #Hypokalemia  -pt already on max K bath    # Metabolic acidosis   - Likely 2/2 renal loss of bicarb and inability to excrete H+ in s/o ARF   - D/c NaHCO3 drip as per renal   - Expect improvement with CRRT   - Monitor BMP q4     ID  CXR clear lungs    # SBP PPx   - Mild ascites per POCUS   - Low suspicion for SBP, defer CTX   - Afebrile without leukocytosis so far     #UTI  -CTX 8/30 -xx  [ ] f/u ucx    Heme  # Macrocytic anemia & Thrombocytopenia   - Likely in s/o poor nutritional status from ETOH use  - Check B12 folate iron studies  - Folate thiamine supplement    # Coagulopathy   - In s/o decompensated alcoholic cirrhosis and ARF   - Give DDAVP for likely plt dysfunction   -[ ] Trend daily PT INR PTT   8/31 INR worsening    # Portal vein occlusion  - U/S Abd doppler from [facility] revealed main and left portal vein occlusions  - Cirrhosis induced coagulopathy  See Gi section    Ethics  # FULL code   -  is surrogate decision maker  56 y.o. F with PMH of ETOH misuse, ETOH cirrhosis, HLD, IBS, anxiety, presents with decompensated alcoholic cirrhosis c/b HRS, likely contributing to acute renal failure and hyponatremia s/p now on CRRT, transferred to [facility] MICU, course c/b hepatic encephalopathy    Neuro  aaox0, lethargic    #Hepatic encephalopathy  - lactulose 20q8, rifaximin  BID  -titrate to 4-5BM daily  -stool count    # ETOH Misuse  - Continue to monitor for w/d symptoms; pt reported last drink was 2.5 wks ago  - SW consult & cessation counseling   - c/w thiamine and folate     # Anxiety  - C/w Xanax PRN   - See ISTOP   -c/w precedex, has been needed to prevent pt from pulling at lines    Cardiovascular  #Hypotension - on levophed  - Can now decrease MAP goal to 70, put unlikely to have renal recovery  -9/1 start midodrine 10 q8    Pulmonary  - Normal O2 Saturation on room air, monitor for volume overload in s/o ARF and hypervolemia   -monitor ability to protect airway    GI  # Hepatorenal syndrome   - See Renal     # ETOH Cirrhosis   - s/p solumedrol 40mg IV qD  -- discontinued as per hepatology  - Maddrey score: 52.4 indicative of poor prognosis  - MELD Score: 38 --?65-66% risk of 90 day mortality   - Pt anuric now, unclear if spironolactone will be helpful  - No evidence of ascites on U/S abd    - Trend liver labs daily CMP   -  NAC - started 8/30    Endoscopy in 2019: no varices, stomach with congestion c/w gastritis vs portal gastropathy    Negative Hep B surface Ag, Hep B core Ab, Hep A IgM  Weakly reactive Hep C Ab  [  ]Hep E IgM  [ ]check serum FARHANA, smooth muscle Ab, anti-LKM-1 Ab, alpha-1 antitrypsin, ferritin, ceruloplasmin, copper, immunoglobulin panel (IgG, IgA, IgM)      # Hep C   - [  ]F/u Hep C viral load  - Hep B negative  -Hep C weakly reactive    #R/o portal vein thrombosis  triple phase CT A/P to r/o portal vein thrombosis --- negative --> Hep gtt discontinued      Urinary retention  s/p metzger DC 8/30  bladder scan q8h  s/p straight cath x1 9/1 am    Renal  # Hepatorenal syndrome, unlikely to have renal recovery  - Suspect acute renal failure 2/2 HRS in s/o alcoholic hepatitis and cirrhosis   -octreatride dc as per hep  -c/w albumin for now  - On CRRT  - Baseline Cr 2.5  - Monitor Cr and avoid nephrotoxic agents  - Renally dose meds   - MAP goal 70  - Monitor I/Os   [ ] discuss with renal about when to start iHD    # Hyponatremia - 114 on presentation  - Euvolemic on exam; possibly 2/2 ARF and or poor oral intake in s/o ETOH use   - Angela 20, suggestive of decreased PO intake  - S/p NS boluses while at [place]  - Trend BMP Mg Phos q4   - Expect Na to improve as volume status improves with CRRT   - Avoid rapid correction, goal 6-8 mEq in 24 hrs   -Sodium correcting appropriately    #Hypokalemia  -pt already on max K bath      ID  CXR clear lungs  Leukocytosis rising while on ceftriaxone, unclear cause.    #E.coli UTI  -CTX 8/30 -xx  ->100k E.coli CFU  [ ] f/u sensitivities    # SBP PPx   - Minimal ascites seen on POCUS 9/1 -- nothing to tap  -On CTX for UTI    Heme  # Macrocytic anemia & Thrombocytopenia   - Likely in s/o poor nutritional status from ETOH use  - Check B12 folate iron studies  - Folate thiamine supplement    # Coagulopathy   - In s/o decompensated alcoholic cirrhosis and ARF   - Give DDAVP for likely plt dysfunction   -[ ] Trend daily PT INR PTT   - INR worsening overall    Ethics  # FULL code   -  is surrogate decision maker  56 y.o. F with PMH of ETOH misuse, ETOH cirrhosis, HLD, IBS, anxiety, presents with decompensated alcoholic cirrhosis c/b HRS, likely contributing to acute renal failure and hyponatremia s/p now on CRRT, transferred to [facility] MICU, course c/b hepatic encephalopathy    Neuro  aaox0, lethargic    #Hepatic encephalopathy  - lactulose 20q8, rifaximin  BID  -titrate to 4-5BM daily  -stool count    # ETOH Misuse  - Continue to monitor for w/d symptoms; pt reported last drink was 2.5 wks ago  - SW consult & cessation counseling   - c/w thiamine and folate     # Anxiety  - C/w Xanax PRN   - See ISTOP   -c/w precedex, has been needed to prevent pt from pulling at lines    Cardiovascular  #Hypotension - on levophed  - Can now decrease MAP goal to 70, put unlikely to have renal recovery  -9/1 start midodrine 10 q8    Pulmonary  - Normal O2 Saturation on room air, monitor for volume overload in s/o ARF and hypervolemia   -monitor ability to protect airway    GI  # Hepatorenal syndrome   - See Renal     # ETOH Cirrhosis   - s/p solumedrol 40mg IV qD  -- discontinued as per hepatology  - Maddrey score: 52.4 indicative of poor prognosis  - MELD Score: 38 --?65-66% risk of 90 day mortality   - Pt anuric now, unclear if spironolactone will be helpful  - No evidence of ascites on U/S abd    - Trend liver labs daily CMP   -  NAC - started 8/30    Endoscopy in 2019: no varices, stomach with congestion c/w gastritis vs portal gastropathy    Negative Hep B surface Ag, Hep B core Ab, Hep A IgM  Weakly reactive Hep C Ab  [  ]Hep E IgM  [x ]check serum FARHANA, smooth muscle Ab, anti-LKM-1 Ab, alpha-1 antitrypsin, ferritin, ceruloplasmin, copper, immunoglobulin panel (IgG, IgA, IgM)      # Hep C   - [  ]F/u Hep C viral load  - Hep B negative  -Hep C weakly reactive    #R/o portal vein thrombosis  triple phase CT A/P to r/o portal vein thrombosis --- negative --> Hep gtt discontinued      Urinary retention  s/p metzger DC 8/30  bladder scan q8h  s/p straight cath x1 9/1 am    Renal  # Hepatorenal syndrome, unlikely to have renal recovery  - Suspect acute renal failure 2/2 HRS in s/o alcoholic hepatitis and cirrhosis   -octreatride dc as per hep  -c/w albumin for now  - On CRRT  - Baseline Cr 2.5  - Monitor Cr and avoid nephrotoxic agents  - Renally dose meds   - MAP goal 70  - Monitor I/Os   [ ] discuss with renal about when to start iHD    # Hyponatremia - 114 on presentation  - Euvolemic on exam; possibly 2/2 ARF and or poor oral intake in s/o ETOH use   - Angela 20, suggestive of decreased PO intake  - S/p NS boluses while at [place]  - Trend BMP Mg Phos q4   - Expect Na to improve as volume status improves with CRRT   - Avoid rapid correction, goal 6-8 mEq in 24 hrs   -Sodium correcting appropriately    #Hypokalemia  -pt already on max K bath      ID  CXR clear lungs  Leukocytosis rising while on ceftriaxone, unclear cause.    #E.coli UTI  -CTX 8/30 -xx  ->100k E.coli CFU  [ ] f/u sensitivities    # SBP PPx   - Minimal ascites seen on POCUS 9/1 -- nothing to tap  -Discussed with GI attending, no need to consult IR right now as nothing to tap  -On CTX for UTI    Heme  # Macrocytic anemia & Thrombocytopenia   - Likely in s/o poor nutritional status from ETOH use  - Check B12 folate iron studies  - Folate thiamine supplement    # Coagulopathy   - In s/o decompensated alcoholic cirrhosis and ARF   - Give DDAVP for likely plt dysfunction   -[ ] Trend daily PT INR PTT   - INR worsening overall    Ethics  # FULL code   -  is surrogate decision maker    Michele updated 9/1

## 2021-09-01 NOTE — PROGRESS NOTE ADULT - ATTENDING COMMENTS
57 yo F w/ history active alcohol use disorder (last drink about 2 weeks ago), known ETOH cirrhosis c/b ascites p/w [facility] with worsening jaundice and renal failure requiring renal replacement therapy transferred here further care.    She is currently in the ICU and critically ill.   Not intubated but needing sedation for agitation.  Dobhoff now in place.  CRRT per ICU team but suspect HRS but plan to transition to intermittent HD if tolerated.  She has continued to drink despite knowledge of her liver disease for several years and appears to have a lack of insight. She is not a transplant candidate currently and will need prolonged sobriety and attendance of a relapse prevention program.  GI physician Dr. Williams López.  NAC drip for alcoholic hepatitis 8/30-9/4/21  Ursodiol 500 mg po BID (9/1-

## 2021-09-02 NOTE — PROGRESS NOTE ADULT - ATTENDING COMMENTS
55 yo F w/ history active alcohol use disorder (last drink about 2 weeks ago), known ETOH cirrhosis c/b ascites p/w [facility] with worsening jaundice and renal failure requiring renal replacement therapy transferred here further care.    She is currently in the ICU and critically ill.   Not intubated but needing sedation for agitation.  CRRT per ICU team but suspect HRS but plan to transition to intermittent HD if tolerated.  Pressor requirements improving.  She has continued to drink despite knowledge of her liver disease for several years and appears to have a lack of insight. She is not a transplant candidate currently and will need prolonged sobriety and attendance of a relapse prevention program.  GI physician Dr. Williams López.  NAC drip for alcoholic hepatitis (8/30-9/4/21)  Ursodiol 500 mg po BID (9/1- )

## 2021-09-02 NOTE — PROGRESS NOTE ADULT - SUBJECTIVE AND OBJECTIVE BOX
[facility] DIVISION OF KIDNEY DISEASES AND HYPERTENSION -- FOLLOW UP NOTE  --------------------------------------------------------------------------------  HPI: 56-year-old Female with PMhx ETOH use disorder (last drink ~2 weeks ago), ETOH cirrhosis with ascites, splenomegaly, HLD, IBS, anxiety presented from [facility] to [facility] MICU on 8/29/21 for severe hyponatremia and JULIANA requiring CRRT. Nephrology consultation requested for JULIANA and hyponatremia. Pt. currently on CRRT since 8/29/21.      24 hour events/subjective: Pt. seen and examined at bedside. Appears calm and minimally conversant. Pt. transitioned from vasopressor support to only midodrine infusion now.      PAST HISTORY  --------------------------------------------------------------------------------  No significant changes to PMH, PSH, FHx, SHx, unless otherwise noted    ALLERGIES & MEDICATIONS  --------------------------------------------------------------------------------  Allergies    No Known Allergies    Intolerances    Standing Inpatient Medications  acetylcysteine IVPB 12 Gram(s) IV Intermittent <User Schedule>  albumin human 25% IVPB 50 milliLiter(s) IV Intermittent every 6 hours  cefTRIAXone   IVPB      cefTRIAXone   IVPB 1000 milliGRAM(s) IV Intermittent every 24 hours  chlorhexidine 4% Liquid 1 Application(s) Topical <User Schedule>  CRRT Treatment    <Continuous>  CRRT Treatment    <Continuous>  dexMEDEtomidine Infusion 0.2 MICROgram(s)/kG/Hr IV Continuous <Continuous>  dextrose 40% Gel 15 Gram(s) Oral once  dextrose 50% Injectable 25 Gram(s) IV Push once  dextrose 50% Injectable 12.5 Gram(s) IV Push once  dextrose 50% Injectable 25 Gram(s) IV Push once  folic acid 1 milliGRAM(s) Oral daily  glucagon  Injectable 1 milliGRAM(s) IntraMuscular once  lactulose Syrup 20 Gram(s) Oral every 6 hours  midodrine. 10 milliGRAM(s) Oral every 8 hours  norepinephrine Infusion 0.05 MICROgram(s)/kG/Min IV Continuous <Continuous>  Phoxillum Filtration BK 4 / 2.5 5000 milliLiter(s) CRRT <Continuous>  PrismaSATE Dialysate BGK 4 / 2.5 5000 milliLiter(s) CRRT <Continuous>  PrismaSOL Filtration BGK 4 / 2.5 5000 milliLiter(s) CRRT <Continuous>  PrismaSOL Filtration BGK 4 / 2.5 5000 milliLiter(s) CRRT <Continuous>  PrismaSOL Filtration BGK 4 / 2.5 5000 milliLiter(s) CRRT <Continuous>  PrismaSOL Filtration BGK 4 / 2.5 5000 milliLiter(s) CRRT <Continuous>  rifAXIMin 550 milliGRAM(s) Oral two times a day  simethicone 80 milliGRAM(s) Chew two times a day  thiamine 100 milliGRAM(s) Oral daily  ursodiol Tablet 500 milliGRAM(s) Oral every 12 hours    PRN Inpatient Medications  sodium chloride 0.9% lock flush 10 milliLiter(s) IV Push every 1 hour PRN    REVIEW OF SYSTEMS  --------------------------------------------------------------------------------  Limited as pt. is somnolent    VITALS/PHYSICAL EXAM  --------------------------------------------------------------------------------  T(C): 36.6 (09-02-21 @ 07:30), Max: 37 (09-01-21 @ 08:00)  HR: 56 (09-02-21 @ 07:30) (56 - 95)  BP: 93/52 (09-02-21 @ 07:30) (84/50 - 143/64)  RR: 11 (09-02-21 @ 07:30) (10 - 28)  SpO2: 97% (09-02-21 @ 07:30) (97% - 100%)  Wt(kg): --    09-01-21 @ 07:01  -  09-02-21 @ 07:00  --------------------------------------------------------  IN: 4523.9 mL / OUT: 3613 mL / NET: 910.9 mL    09-02-21 @ 07:01  -  09-02-21 @ 07:52  --------------------------------------------------------  IN: 190.7 mL / OUT: 0 mL / NET: 190.7 mL    Physical Exam:  	Gen: NAD, appears lethargic  	HEENT: icetric  	Pulm: CTA B/L  	CV: S1S2+  	Abd: Soft, +BS   	Ext: B/L LE edema +  	Neuro: Awake but somnolent  	Skin: Warm and dry    LABS/STUDIES  --------------------------------------------------------------------------------              6.7    21.29 >-----------<  49       [09-02-21 @ 02:12]              20.2     134  |  100  |  16  ----------------------------<  187      [09-02-21 @ 02:12]  4.0   |  19  |  1.42        Ca     9.1     [09-02-21 @ 02:12]      Mg     2.3     [09-02-21 @ 02:12]      Phos  2.0     [09-02-21 @ 02:12]    TPro  5.8  /  Alb  3.9  /  TBili  21.0  /  DBili  x   /  AST  114  /  ALT  74  /  AlkPhos  193  [09-02-21 @ 02:12]    PT/INR: PT 25.2 , INR 2.18       [09-02-21 @ 02:12]  PTT: 58.5       [09-02-21 @ 02:12]    Creatinine Trend:  SCr 1.42 [09-02 @ 02:12]  SCr 1.55 [09-01 @ 20:18]  SCr 1.58 [09-01 @ 14:42]  SCr 1.82 [09-01 @ 08:37]  SCr 2.37 [09-01 @ 01:46]

## 2021-09-02 NOTE — PROGRESS NOTE ADULT - ASSESSMENT
56 y.o. F with PMH of ETOH misuse, ETOH cirrhosis, HLD, IBS, anxiety, presents with decompensated alcoholic cirrhosis c/b HRS, likely contributing to acute renal failure and hyponatremia s/p now on CRRT, transferred to [facility] MICU, course c/b hepatic encephalopathy    Neuro  aaox0, lethargic    #Hepatic encephalopathy  - lactulose 20q8, rifaximin  BID  -titrate to 4-5BM daily  -stool count    # ETOH Misuse  - Continue to monitor for w/d symptoms; pt reported last drink was 2.5 wks ago  - SW consult & cessation counseling   - c/w thiamine and folate     # Anxiety  - C/w Xanax PRN   - See ISTOP   -c/w precedex, has been needed to prevent pt from pulling at lines    Cardiovascular  #Hypotension - on levophed  - Can now decrease MAP goal to 70, put unlikely to have renal recovery  -9/1 start midodrine 10 q8    Pulmonary  - Normal O2 Saturation on room air, monitor for volume overload in s/o ARF and hypervolemia   -monitor ability to protect airway    GI  # Hepatorenal syndrome   - See Renal     # ETOH Cirrhosis   - s/p solumedrol 40mg IV qD  -- discontinued as per hepatology  - Maddrey score: 52.4 indicative of poor prognosis  - MELD Score: 38 --?65-66% risk of 90 day mortality   - Pt anuric now, unclear if spironolactone will be helpful  - No evidence of ascites on U/S abd    - Trend liver labs daily CMP   -  NAC - started 8/30    Endoscopy in 2019: no varices, stomach with congestion c/w gastritis vs portal gastropathy    Negative Hep B surface Ag, Hep B core Ab, Hep A IgM  Weakly reactive Hep C Ab  [  ]Hep E IgM  [x ]check serum FARHANA, smooth muscle Ab, anti-LKM-1 Ab, alpha-1 antitrypsin, ferritin, ceruloplasmin, copper, immunoglobulin panel (IgG, IgA, IgM)      # Hep C   - [  ]F/u Hep C viral load  - Hep B negative  -Hep C weakly reactive    #R/o portal vein thrombosis  triple phase CT A/P to r/o portal vein thrombosis --- negative --> Hep gtt discontinued      Urinary retention  s/p metzger DC 8/30  bladder scan q8h  s/p straight cath x1 9/1 am    Renal  # Hepatorenal syndrome, unlikely to have renal recovery  - Suspect acute renal failure 2/2 HRS in s/o alcoholic hepatitis and cirrhosis   -octreatride dc as per hep  -c/w albumin for now  - On CRRT  - Baseline Cr 2.5  - Monitor Cr and avoid nephrotoxic agents  - Renally dose meds   - MAP goal 70  - Monitor I/Os   [ ] discuss with renal about when to start iHD    # Hyponatremia - 114 on presentation  - Euvolemic on exam; possibly 2/2 ARF and or poor oral intake in s/o ETOH use   - Angela 20, suggestive of decreased PO intake  - S/p NS boluses while at [place]  - Trend BMP Mg Phos q4   - Expect Na to improve as volume status improves with CRRT   - Avoid rapid correction, goal 6-8 mEq in 24 hrs   -Sodium correcting appropriately    #Hypokalemia  -pt already on max K bath      ID  CXR clear lungs  Leukocytosis rising while on ceftriaxone, unclear cause.    #E.coli UTI  -CTX 8/30 -xx  ->100k E.coli CFU  [ ] f/u sensitivities    # SBP PPx   - Minimal ascites seen on POCUS 9/1 -- nothing to tap  -Discussed with GI attending, no need to consult IR right now as nothing to tap  -On CTX for UTI    Heme  # Macrocytic anemia & Thrombocytopenia   - Likely in s/o poor nutritional status from ETOH use  - Check B12 folate iron studies  - Folate thiamine supplement    # Coagulopathy   - In s/o decompensated alcoholic cirrhosis and ARF   - Give DDAVP for likely plt dysfunction   -[ ] Trend daily PT INR PTT   - INR worsening overall    Ethics  # FULL code   -  is surrogate decision maker    Michele updated 9/1 56 y.o. F with PMH of ETOH misuse, ETOH cirrhosis, HLD, IBS, anxiety, presents with decompensated alcoholic cirrhosis c/b HRS, likely contributing to acute renal failure and hyponatremia s/p now on CRRT, transferred to [facility] MICU, course c/b hepatic encephalopathy    9/2 target MAPs 65s, c/w midodrine, wean levo. Discuss with hep whether to c/w albumin gtt.  BMPq6h while on CRRT, CBC q12. start seroquel 25q12 for agitation. Called lab for hemolysis labs/schistocyte eval.    Neuro  aaox0, lethargic, but awakens (but enceophalopathic) when off precedex    #Delirium  -seroquel 25 q12  -c/w precedex, has been needed to prevent pt from pulling at lines    #Hepatic encephalopathy  - lactulose 20q8, rifaximin  BID  -titrate to 4-5BM daily  -stool count    # ETOH Misuse  - Continue to monitor for w/d symptoms; pt reported last drink was 2.5 wks ago  - SW consult & cessation counseling   - c/w thiamine and folate     # Anxiety  - C/w Xanax PRN   - See ISTOP   -c/w precedex, has been needed to prevent pt from pulling at lines    Cardiovascular  #Hypotension - on levophed  - Can now decrease MAP goal to 65, put unlikely to have renal recovery  - midodrine 10 q8    Pulmonary  - Normal O2 Saturation on room air, monitor for volume overload in s/o ARF and hypervolemia   -monitor ability to protect airway    GI  # Hepatorenal syndrome   - See Renal     # ETOH Cirrhosis   - s/p solumedrol 40mg IV qD  -- discontinued as per hepatology  - Maddrey score: 52.4 indicative of poor prognosis  - MELD Score: 38 --?65-66% risk of 90 day mortality   - Pt anuric now, unclear if spironolactone will be helpful  - small ascites on U/S abd, no pockets available to tap    - Trend liver labs daily CMP   -  NAC - started 8/30 - 9/3 for 5 days    Endoscopy in 2019: no varices, stomach with congestion c/w gastritis vs portal gastropathy    Negative Hep B surface Ag, Hep B core Ab, Hep A IgM  Weakly reactive Hep C Ab  [ x ]Hep E IgM sent  [x ]check serum FARHANA, smooth muscle Ab, anti-LKM-1 Ab, alpha-1 antitrypsin, ferritin, ceruloplasmin, copper, immunoglobulin panel (IgG, IgA, IgM)    # Hep C weakly reactive, but Hep C RNA viral load negative    #R/o portal vein thrombosis  triple phase CT A/P to r/o portal vein thrombosis --- negative --> Hep gtt discontinued      Urinary retention  s/p metzger DC 8/30  bladder scan q8h  s/p straight cath x1 9/1 am with 100cc    Renal  # Hepatorenal syndrome, unlikely to have renal recovery  - Suspect acute renal failure 2/2 HRS in s/o alcoholic hepatitis and cirrhosis   - s/p octreatride gtt  -c/w albumin for now [] will discuss with hep  - On CRRT  - Baseline Cr 2.5  - Monitor Cr and avoid nephrotoxic agents  - Renally dose meds   - MAP goal 70  - Monitor I/Os   [ ] discuss with renal about when to start iHD  [ ] bmp q6h while on CRRT    # Hyponatremia - 114 on presentation, now resolved.  - Euvolemic on exam; possibly 2/2 ARF and or poor oral intake in s/o ETOH use   - Angela 20, suggestive of decreased PO intake    ID  CXR clear lungs  Leukocytosis rising while on ceftriaxone, unclear cause.    #E.coli UTI  -CTX 8/30 -xx -- plan for 7d  ->100k E.coli CFU  sensitive to CTX    # SBP PPx   - Minimal ascites seen on POCUS 9/1 -- nothing to tap  -Discussed with GI attending, no need to consult IR right now as nothing to tap  -On CTX for UTI    Heme  # Macrocytic anemia & Thrombocytopenia   - Likely in s/o poor nutritional status from ETOH use  - B12  normal, folate iron studies  - Folate thiamine supplement    # Coagulopathy   - In s/o decompensated alcoholic cirrhosis and ARF   - Give DDAVP for likely plt dysfunction   -[ ] Trend daily PT INR PTT   - INR worsening overall    Ethics  # FULL code   -  is surrogate decision maker    Michele updated 9/1

## 2021-09-02 NOTE — PROGRESS NOTE ADULT - ASSESSMENT
57 yo f pmhx ETOH abuse (last drink ~2 weeks ago), ETOH cirrhosis c/b ascites, splenomegaly, IBS who was transferred from [place] ICU 8/29. She initially presented to [facility] ED from home after being sent in by her GI Dr. López for abnl labs.  Per patient over the last week she has noticed her sclera/skin to be bright orange, fatigued, lightheaded, nausea, poor appetite and abdominal bloating. In [place] ED, patient found to be grossly jaundiced, labs significant for Na 114, BUN/Cr 146/16.3, serum co2 14, tbili 27.1, ast/alt 205/113. While in [facility] ICU, pt was treated for alcoholic hepatitis s/p solumedrol, hepatorenal syndrome s/p octreotide, albumin, and acute renal failure. Pt was seen by nephrology, who recommended transfer to tertiary care center for initiation of CVVH. Hyponatremia was treated with NS. Pt was also maintained on NaHCO3 drip for metabolic acidosis. Lactulose was titrated to 20mg TID. Spironolactone was not given due to ARF in s/o HRS. GI was consulted for alcoholic hepatitis as well as possible liver transplant eval, which also agreed to transfer to tertiary center for further evaluation. An ICU to ICU transfer was initiated and pt was transferred to [facility] 8/29 and was started on CVVHDF. Hepatology was consulted due to decompensated ETOH cirrhosis. OP GI Dr. Williams López.     Impression:  #Decompensated ETOH cirrhosis- MELDNa 39 (on CVVHDF); MDF 58.5 (poor prognosis); HAV/HBV neg  -Ascites: no ascites on US abd 8/28  -Varices: Last EGD 04/2019 w/o EV  -HCC: no focal lesions seen on US abd +doppler 8/28  -HE: oriented to person on exam, +asterixis on exam  #E coli UTI- UA with +WBC>150 and +LE: urine cx with >100K CFU E coli; s/p CTX (8/31-9/1), now on Zosyn (9/2-->)  #+HCV Ab- pending RNA PCR    Recommendations:  - f/u Hep C RNA PCR, Hep E IgM  - rifaxamin 550 mg BID, lactulose 20 mg q6h; titrate to goal 3-4 BM/day  - midodrine 10 mg TID  - ursodiol 500 mg BID  - c/w NAC (8/30-9/4)  Initiate N-acetylcysteine as a continuous infusion in the following steps  1) 150mg/kg in 250 mL 5% dextrose over 30 mins  2) 50mg/kg in 500 mL 5% dextrose solution over 4 hours  3) 100mg/kg in 1000 mL 5% dextrose solution over 16 hours  4) 100mg/kg in 1000 mL 5% dextrose solution over days 2-5  - continue albumin 25% 50 mL q6h  - consult IR for diagnostic paracentesis (would need COVID Abbott rapid test)  - trend INR, platelet and CMP daily       We will continue to follow.    Berenice Rincon MD  GI Fellow, PGY-4  Available via [employer] Teams    NON-URGENT CONSULTS:  Please email [email or web address]  AT NIGHT AND ON WEEKENDS:  Contact on-call GI fellow via answering service ([phone]) from 5pm-8am and on weekends/holidays  MONDAY-FRIDAY 8AM-5PM:  Pager# [phone] ([facility]) or [phone] ([facility])  GI Phone# [phone] ([facility])   55 yo f pmhx ETOH abuse (last drink ~2 weeks ago), ETOH cirrhosis c/b ascites, splenomegaly, IBS who was transferred from [place] ICU 8/29. She initially presented to [place] ED from home after being sent in by her GI Dr. López for abnl labs.  Per patient over the last week she has noticed her sclera/skin to be bright orange, fatigued, lightheaded, nausea, poor appetite and abdominal bloating. In [place] ED, patient found to be grossly jaundiced, labs significant for Na 114, BUN/Cr 146/16.3, serum co2 14, tbili 27.1, ast/alt 205/113. While in [place] ICU, pt was treated for alcoholic hepatitis s/p solumedrol, hepatorenal syndrome s/p octreotide, albumin, and acute renal failure. Pt was seen by nephrology, who recommended transfer to tertiary care center for initiation of CVVH. Hyponatremia was treated with NS. Pt was also maintained on NaHCO3 drip for metabolic acidosis. Lactulose was titrated to 20mg TID. Spironolactone was not given due to ARF in s/o HRS. GI was consulted for alcoholic hepatitis as well as possible liver transplant eval, which also agreed to transfer to tertiary center for further evaluation. An ICU to ICU transfer was initiated and pt was transferred to [facility] 8/29 and was started on CVVHDF. Hepatology was consulted due to decompensated ETOH cirrhosis. OP GI Dr. Williams López.     Impression:  #Decompensated ETOH cirrhosis- MELDNa 39 (on CVVHDF); MDF 58.5 (poor prognosis); HAV/HBV neg  -Ascites: no ascites on US abd 8/28  -Varices: Last EGD 04/2019 w/o EV  -HCC: no focal lesions seen on US abd +doppler 8/28  -HE: oriented to person on exam, +asterixis on exam  #E coli UTI- UA with +WBC>150 and +LE: urine cx with >100K CFU E coli; s/p CTX (8/31-9/1), now on Zosyn (9/2-->)  #+HCV Ab- pending RNA PCR    Recommendations:  - f/u Hep C RNA PCR, Hep E IgM  - rifaxamin 550 mg BID, lactulose 20 mg q6h; titrate to goal 3-4 BM/day  - midodrine 10 mg TID  - ursodiol 500 mg BID  - c/w NAC (8/30-9/4)  Initiate N-acetylcysteine as a continuous infusion in the following steps  1) 150mg/kg in 250 mL 5% dextrose over 30 mins  2) 50mg/kg in 500 mL 5% dextrose solution over 4 hours  3) 100mg/kg in 1000 mL 5% dextrose solution over 16 hours  4) 100mg/kg in 1000 mL 5% dextrose solution over days 2-5  - continue albumin 25% 50 mL q6h  - trend INR, platelet and CMP daily       We will continue to follow.    Berenice Rincon MD  GI Fellow, PGY-4  Available via [employer] Teams    NON-URGENT CONSULTS:  Please email [email or web address]  AT NIGHT AND ON WEEKENDS:  Contact on-call GI fellow via answering service ([phone]) from 5pm-8am and on weekends/holidays  MONDAY-FRIDAY 8AM-5PM:  Pager# [phone] ([facility]) or [phone] ([facility])  GI Phone# [phone] ([facility])

## 2021-09-02 NOTE — PROGRESS NOTE ADULT - SUBJECTIVE AND OBJECTIVE BOX
Chief Complaint:  Patient is a 56y old  Female who presents with a chief complaint of CRRT (02 Sep 2021 07:51)      Interval Events: Still on precedex for agitation, has had 4 BMs so far today.      Hospital Medications:  acetylcysteine IVPB 12 Gram(s) IV Intermittent <User Schedule>  albumin human 25% IVPB 50 milliLiter(s) IV Intermittent every 6 hours  chlorhexidine 4% Liquid 1 Application(s) Topical <User Schedule>  CRRT Treatment    <Continuous>  dexMEDEtomidine Infusion 0.2 MICROgram(s)/kG/Hr IV Continuous <Continuous>  dextrose 40% Gel 15 Gram(s) Oral once  dextrose 50% Injectable 25 Gram(s) IV Push once  dextrose 50% Injectable 12.5 Gram(s) IV Push once  dextrose 50% Injectable 25 Gram(s) IV Push once  folic acid 1 milliGRAM(s) Oral daily  glucagon  Injectable 1 milliGRAM(s) IntraMuscular once  lactulose Syrup 20 Gram(s) Oral every 6 hours  midodrine. 10 milliGRAM(s) Oral every 8 hours  norepinephrine Infusion 0.05 MICROgram(s)/kG/Min IV Continuous <Continuous>  Phoxillum Filtration BK 4 / 2.5 5000 milliLiter(s) CRRT <Continuous>  piperacillin/tazobactam IVPB.. 3.375 Gram(s) IV Intermittent every 8 hours  PrismaSOL Filtration BGK 4 / 2.5 5000 milliLiter(s) CRRT <Continuous>  PrismaSOL Filtration BGK 4 / 2.5 5000 milliLiter(s) CRRT <Continuous>  QUEtiapine 25 milliGRAM(s) Oral every 12 hours  rifAXIMin 550 milliGRAM(s) Oral two times a day  simethicone 80 milliGRAM(s) Chew two times a day  sodium chloride 0.9% lock flush 10 milliLiter(s) IV Push every 1 hour PRN  thiamine 100 milliGRAM(s) Oral daily  ursodiol Tablet 500 milliGRAM(s) Oral every 12 hours      PMHX/PSHX:  Anxiety    GERD (gastroesophageal reflux disease)    Constipation    Fatty liver    IBS (irritable bowel syndrome)    Liver cirrhosis    HLD (hyperlipidemia)    Claustrophobia    ETOH abuse    Lipoma    H/O colonoscopy    H/O endoscopy    Lipoma of back            ROS: unable to obtain as pt was somnolent      PHYSICAL EXAM:     GENERAL:  Appears stated age, +somnolent  HEENT:  NC/AT, eyes closed  CHEST:  Full & symmetric excursion, no increased effort, breath sounds clear  HEART:  Regular rhythm, S1, S2, no murmur/rub/S3/S4  ABDOMEN:  Soft, non-tender, non-distended, normoactive bowel sounds  EXTREMITIES:  1+ BL LE edema  SKIN:  +jaundiced  NEURO:  +somnolent and difficult to arouse, on sedation    Vital Signs:  Vital Signs Last 24 Hrs  T(C): 36.9 (02 Sep 2021 12:00), Max: 37 (02 Sep 2021 04:00)  T(F): 98.4 (02 Sep 2021 12:00), Max: 98.6 (02 Sep 2021 04:00)  HR: 74 (02 Sep 2021 14:45) (56 - 91)  BP: 102/50 (02 Sep 2021 14:45) (84/50 - 143/64)  BP(mean): 72 (02 Sep 2021 14:45) (62 - 92)  RR: 13 (02 Sep 2021 14:45) (11 - 28)  SpO2: 99% (02 Sep 2021 14:45) (97% - 100%)  Daily     Daily     LABS:                        7.5    19.78 )-----------( 47       ( 02 Sep 2021 08:30 )             22.2     09-02    135  |  99  |  15  ----------------------------<  184<H>  4.4   |  21<L>  |  1.17    Ca    9.1      02 Sep 2021 14:37  Phos  2.1     09-02  Mg     2.3     09-02    TPro  5.8<L>  /  Alb  3.9  /  TBili  21.0<H>  /  DBili  x   /  AST  114<H>  /  ALT  74<H>  /  AlkPhos  193<H>  09-02    LIVER FUNCTIONS - ( 02 Sep 2021 02:12 )  Alb: 3.9 g/dL / Pro: 5.8 g/dL / ALK PHOS: 193 U/L / ALT: 74 U/L / AST: 114 U/L / GGT: x           PT/INR - ( 02 Sep 2021 02:12 )   PT: 25.2 sec;   INR: 2.18 ratio         PTT - ( 02 Sep 2021 02:12 )  PTT:58.5 sec        Imaging:     EXAM:  US ABDOMEN LIMITED                            PROCEDURE DATE:  09/01/2021            INTERPRETATION:  CLINICAL INFORMATION: Decompensated alcoholic cirrhosis. Assess for ascites.    COMPARISON: CT abdomen and pelvis 8/30/2021..    TECHNIQUE:Grayscale sonographic images of the 4 quadrants were obtained.    FINDINGS/  IMPRESSION:  Small ascites in the right upper quadrant, right lower quadrant and left lower quadrant. No ascites in the left upper quadrant. Largest pockets are in the bilateral lower quadrants.    Incidental note of cirrhosis and splenomegaly.

## 2021-09-02 NOTE — PROGRESS NOTE ADULT - ATTENDING COMMENTS
Patient seen and examined. Patient critically ill requiring frequent bedside visits with therapy change. Patient is a 56F EtOH abuse, cirrhosis/portal HTN, IBS, and HLD who is transferred from [facility] for liver and renal dysfunction. She was admitted to MICU for concern of hepatorenal syndrome and initiation of CRRT.    1. Shock state - in the setting of hepatorenal syndrome will aim for a MAP > 65 with Levophed and Midodrine.   - Continue Ceftriaxone for E. coli UTI  2. Acute renal Failure - likely hepatorenal now on CRRT. Will continue net even. Nephrology followup. Serial BMP. Will need to d/w Nephro regarding long term plan for CRRT vs eventual transition to HD  - Hyponatremia improved  3. Acute Liver failure - patient with elevated bilirubin in the setting of known EtOh use. Will continue NAC x 5 days. Will hold off steroids at this time per d/w Hepatology.  - US with possible portal vein clots - followup official CT abdomen/pelvis with patent portal vessesl - no AC  - HCV weakly positive  - cont Albumin and Ursodiol  - Asterixis - Lactulose and Rifaximin - NGT placed and patient able to get feeds/meds  - prior EGD without evidence of varices 2019  - Small ascites without safe pocket for bedside paracentesis  4. Pulmonary - stable and on RA. RVP with adenovirus  5. Infectious Disease - E. coli UTI on Ceftriaxone  6. Encephalopathy - due to liver disease and anxiety. Wean precedex as able    Long term prognosis guarded. Nephrology and Hepatology followup.

## 2021-09-02 NOTE — PROGRESS NOTE ADULT - ATTENDING COMMENTS
# JULIANA/ ATN - Current on CRRT. Etiology of JULIANA secondary to ATN from bilirubin cast nephropathy or hepatorenal syndrome.  No signs of renal recovery yet. Continue CRRT.     # Hyponatremia - multifactorial . Secondary to volume overload in the setting of oliguric JULIANA. Serum sodium has increased gradually with CRRT. Latest sodium is appropriate at 134 today.     # anemia - Etiology of Anemia unclear. received 2 units of PRBC sinc e8/30/21. Continue to monitor H/H.     # Metabolic acidosis - secondary to uremia. Continue CRRT.     The rest of the recommendations as per fellow's note.    Gracie Ho MD  Attending Nephrologist  [phone]

## 2021-09-02 NOTE — PROGRESS NOTE ADULT - SUBJECTIVE AND OBJECTIVE BOX
MICU PROGRESS NOTE    INTERVAL HPI/OVERNIGHT EVENTS:    SUBJECTIVE:     OBJECTIVE:    VITAL SIGNS:  ICU Vital Signs Last 24 Hrs  T(C): 36.6 (02 Sep 2021 07:30), Max: 37 (01 Sep 2021 08:00)  T(F): 97.9 (02 Sep 2021 07:30), Max: 98.6 (01 Sep 2021 08:00)  HR: 56 (02 Sep 2021 07:30) (56 - 95)  BP: 93/52 (02 Sep 2021 07:30) (84/50 - 143/64)  BP(mean): 71 (02 Sep 2021 07:30) (62 - 94)  ABP: --  ABP(mean): --  RR: 11 (02 Sep 2021 07:30) (10 - 28)  SpO2: 97% (02 Sep 2021 07:30) (97% - 100%)      VENTS:      I&O:    09-01 @ 07:01  -  09-02 @ 07:00  --------------------------------------------------------  IN: 4523.9 mL / OUT: 3613 mL / NET: 910.9 mL    09-02 @ 07:01  -  09-02 @ 07:46  --------------------------------------------------------  IN: 190.7 mL / OUT: 0 mL / NET: 190.7 mL        PHYSICAL EXAM:  GENERAL: NAD, conversant  CHEST/LUNG: Clear to auscultation bilaterally; No crackles, rhonchi, wheezing, or rubs  HEART: Regular rate and rhythm; No murmurs, rubs, or gallops  ABDOMEN: Soft, Nontender, Nondistended; Bowel sounds present  EXTREMITIES:  2+ Peripheral Pulses, No clubbing, cyanosis, or edema  SKIN: No rashes or lesions  NERVOUS SYSTEM:  Alert & Oriented, Good concentration      LINES:                                       MEDICATIONS:  MEDICATIONS  (STANDING):  acetylcysteine IVPB 12 Gram(s) IV Intermittent <User Schedule>  albumin human 25% IVPB 50 milliLiter(s) IV Intermittent every 6 hours  cefTRIAXone   IVPB      cefTRIAXone   IVPB 1000 milliGRAM(s) IV Intermittent every 24 hours  chlorhexidine 4% Liquid 1 Application(s) Topical <User Schedule>  CRRT Treatment    <Continuous>  CRRT Treatment    <Continuous>  dexMEDEtomidine Infusion 0.2 MICROgram(s)/kG/Hr (4.05 mL/Hr) IV Continuous <Continuous>  dextrose 40% Gel 15 Gram(s) Oral once  dextrose 50% Injectable 25 Gram(s) IV Push once  dextrose 50% Injectable 12.5 Gram(s) IV Push once  dextrose 50% Injectable 25 Gram(s) IV Push once  folic acid 1 milliGRAM(s) Oral daily  glucagon  Injectable 1 milliGRAM(s) IntraMuscular once  lactulose Syrup 20 Gram(s) Oral every 6 hours  midodrine. 10 milliGRAM(s) Oral every 8 hours  norepinephrine Infusion 0.05 MICROgram(s)/kG/Min (7.59 mL/Hr) IV Continuous <Continuous>  Phoxillum Filtration BK 4 / 2.5 5000 milliLiter(s) (900 mL/Hr) CRRT <Continuous>  PrismaSATE Dialysate BGK 4 / 2.5 5000 milliLiter(s) (900 mL/Hr) CRRT <Continuous>  PrismaSOL Filtration BGK 4 / 2.5 5000 milliLiter(s) (700 mL/Hr) CRRT <Continuous>  PrismaSOL Filtration BGK 4 / 2.5 5000 milliLiter(s) (200 mL/Hr) CRRT <Continuous>  PrismaSOL Filtration BGK 4 / 2.5 5000 milliLiter(s) (700 mL/Hr) CRRT <Continuous>  PrismaSOL Filtration BGK 4 / 2.5 5000 milliLiter(s) (200 mL/Hr) CRRT <Continuous>  rifAXIMin 550 milliGRAM(s) Oral two times a day  simethicone 80 milliGRAM(s) Chew two times a day  thiamine 100 milliGRAM(s) Oral daily  ursodiol Tablet 500 milliGRAM(s) Oral every 12 hours    MEDICATIONS  (PRN):  sodium chloride 0.9% lock flush 10 milliLiter(s) IV Push every 1 hour PRN Pre/post blood products, medications, blood draw, and to maintain line patency      ALLERGIES:  Allergies    No Known Allergies    Intolerances        LABS:                        6.7    21.29 )-----------( 49       ( 02 Sep 2021 02:12 )             20.2     09-02    134<L>  |  100  |  16  ----------------------------<  187<H>  4.0   |  19<L>  |  1.42<H>    Ca    9.1      02 Sep 2021 02:12  Phos  2.0     09-02  Mg     2.3     09-02    TPro  5.8<L>  /  Alb  3.9  /  TBili  21.0<H>  /  DBili  x   /  AST  114<H>  /  ALT  74<H>  /  AlkPhos  193<H>  09-02    PT/INR - ( 02 Sep 2021 02:12 )   PT: 25.2 sec;   INR: 2.18 ratio         PTT - ( 02 Sep 2021 02:12 )  PTT:58.5 sec      CAPILLARY BLOOD GLUCOSE          RADIOLOGY & ADDITIONAL TESTS: Reviewed. MICU PROGRESS NOTE    INTERVAL HPI/OVERNIGHT EVENTS:  O/n Hgb 6.7, given 1U PRBC.     SUBJECTIVE:   Lethargic. Remains on CRRT and precedex. When pt's precedex decreases pt moving around, climbing out of bed.     OBJECTIVE:    VITAL SIGNS:  ICU Vital Signs Last 24 Hrs  T(C): 36.6 (02 Sep 2021 07:30), Max: 37 (01 Sep 2021 08:00)  T(F): 97.9 (02 Sep 2021 07:30), Max: 98.6 (01 Sep 2021 08:00)  HR: 56 (02 Sep 2021 07:30) (56 - 95)  BP: 93/52 (02 Sep 2021 07:30) (84/50 - 143/64)  BP(mean): 71 (02 Sep 2021 07:30) (62 - 94)  ABP: --  ABP(mean): --  RR: 11 (02 Sep 2021 07:30) (10 - 28)  SpO2: 97% (02 Sep 2021 07:30) (97% - 100%)      VENTS:      I&O:    09-01 @ 07:01 - 09-02 @ 07:00  --------------------------------------------------------  IN: 4523.9 mL / OUT: 3613 mL / NET: 910.9 mL    09-02 @ 07:01 - 09-02 @ 07:46  --------------------------------------------------------  IN: 190.7 mL / OUT: 0 mL / NET: 190.7 mL        PHYSICAL EXAM:  GENERAL: NAD, conversant  CHEST/LUNG: Clear to auscultation bilaterally; No crackles, rhonchi, wheezing, or rubs  HEART: Regular rate and rhythm; No murmurs, rubs, or gallops  ABDOMEN: Soft, Nontender, Nondistended; Bowel sounds present  EXTREMITIES:  2+ Peripheral Pulses, No clubbing, cyanosis, or edema  SKIN: No rashes or lesions  NERVOUS SYSTEM:  Alert & Oriented, Good concentration      LINES:                                       MEDICATIONS:  MEDICATIONS  (STANDING):  acetylcysteine IVPB 12 Gram(s) IV Intermittent <User Schedule>  albumin human 25% IVPB 50 milliLiter(s) IV Intermittent every 6 hours  cefTRIAXone   IVPB      cefTRIAXone   IVPB 1000 milliGRAM(s) IV Intermittent every 24 hours  chlorhexidine 4% Liquid 1 Application(s) Topical <User Schedule>  CRRT Treatment    <Continuous>  CRRT Treatment    <Continuous>  dexMEDEtomidine Infusion 0.2 MICROgram(s)/kG/Hr (4.05 mL/Hr) IV Continuous <Continuous>  dextrose 40% Gel 15 Gram(s) Oral once  dextrose 50% Injectable 25 Gram(s) IV Push once  dextrose 50% Injectable 12.5 Gram(s) IV Push once  dextrose 50% Injectable 25 Gram(s) IV Push once  folic acid 1 milliGRAM(s) Oral daily  glucagon  Injectable 1 milliGRAM(s) IntraMuscular once  lactulose Syrup 20 Gram(s) Oral every 6 hours  midodrine. 10 milliGRAM(s) Oral every 8 hours  norepinephrine Infusion 0.05 MICROgram(s)/kG/Min (7.59 mL/Hr) IV Continuous <Continuous>  Phoxillum Filtration BK 4 / 2.5 5000 milliLiter(s) (900 mL/Hr) CRRT <Continuous>  PrismaSATE Dialysate BGK 4 / 2.5 5000 milliLiter(s) (900 mL/Hr) CRRT <Continuous>  PrismaSOL Filtration BGK 4 / 2.5 5000 milliLiter(s) (700 mL/Hr) CRRT <Continuous>  PrismaSOL Filtration BGK 4 / 2.5 5000 milliLiter(s) (200 mL/Hr) CRRT <Continuous>  PrismaSOL Filtration BGK 4 / 2.5 5000 milliLiter(s) (700 mL/Hr) CRRT <Continuous>  PrismaSOL Filtration BGK 4 / 2.5 5000 milliLiter(s) (200 mL/Hr) CRRT <Continuous>  rifAXIMin 550 milliGRAM(s) Oral two times a day  simethicone 80 milliGRAM(s) Chew two times a day  thiamine 100 milliGRAM(s) Oral daily  ursodiol Tablet 500 milliGRAM(s) Oral every 12 hours    MEDICATIONS  (PRN):  sodium chloride 0.9% lock flush 10 milliLiter(s) IV Push every 1 hour PRN Pre/post blood products, medications, blood draw, and to maintain line patency      ALLERGIES:  Allergies    No Known Allergies    Intolerances        LABS:                        6.7    21.29 )-----------( 49       ( 02 Sep 2021 02:12 )             20.2     09-02    134<L>  |  100  |  16  ----------------------------<  187<H>  4.0   |  19<L>  |  1.42<H>    Ca    9.1      02 Sep 2021 02:12  Phos  2.0     09-02  Mg     2.3     09-02    TPro  5.8<L>  /  Alb  3.9  /  TBili  21.0<H>  /  DBili  x   /  AST  114<H>  /  ALT  74<H>  /  AlkPhos  193<H>  09-02    PT/INR - ( 02 Sep 2021 02:12 )   PT: 25.2 sec;   INR: 2.18 ratio         PTT - ( 02 Sep 2021 02:12 )  PTT:58.5 sec      CAPILLARY BLOOD GLUCOSE          RADIOLOGY & ADDITIONAL TESTS: Reviewed. MICU PROGRESS NOTE    INTERVAL HPI/OVERNIGHT EVENTS:  O/n Hgb 6.7, given 1U PRBC. Multiple BMs.    SUBJECTIVE:   Lethargic. Remains on CRRT and precedex. When pt's precedex decreases pt moving around, climbing out of bed.   Able to answer some questions with 1 word.    OBJECTIVE:    VITAL SIGNS:  ICU Vital Signs Last 24 Hrs  T(C): 36.6 (02 Sep 2021 07:30), Max: 37 (01 Sep 2021 08:00)  T(F): 97.9 (02 Sep 2021 07:30), Max: 98.6 (01 Sep 2021 08:00)  HR: 56 (02 Sep 2021 07:30) (56 - 95)  BP: 93/52 (02 Sep 2021 07:30) (84/50 - 143/64)  BP(mean): 71 (02 Sep 2021 07:30) (62 - 94)  ABP: --  ABP(mean): --  RR: 11 (02 Sep 2021 07:30) (10 - 28)  SpO2: 97% (02 Sep 2021 07:30) (97% - 100%)      VENTS:      I&O:    09-01 @ 07:01 - 09-02 @ 07:00  --------------------------------------------------------  IN: 4523.9 mL / OUT: 3613 mL / NET: 910.9 mL    09-02 @ 07:01 - 09-02 @ 07:46  --------------------------------------------------------  IN: 190.7 mL / OUT: 0 mL / NET: 190.7 mL        PHYSICAL EXAM:  GENERAL: sleeping, lethargic, Jaundice  HEENT: scleral icterus  CHEST/LUNG: Clear to auscultation bilaterally; No crackles, rhonchi, wheezing, or rubs  HEART: Regular rate and rhythm; No murmurs, rubs, or gallops  ABDOMEN: Soft, Nontender, +distended; Bowel sounds present  EXTREMITIES:  no edema  SKIN: No rashes or lesions  NERVOUS SYSTEM: aaox0 lethargic, states "yes/no" to questions  LINES:        PANTERA Barone central line                               MEDICATIONS:  MEDICATIONS  (STANDING):  acetylcysteine IVPB 12 Gram(s) IV Intermittent <User Schedule>  albumin human 25% IVPB 50 milliLiter(s) IV Intermittent every 6 hours  cefTRIAXone   IVPB      cefTRIAXone   IVPB 1000 milliGRAM(s) IV Intermittent every 24 hours  chlorhexidine 4% Liquid 1 Application(s) Topical <User Schedule>  CRRT Treatment    <Continuous>  CRRT Treatment    <Continuous>  dexMEDEtomidine Infusion 0.2 MICROgram(s)/kG/Hr (4.05 mL/Hr) IV Continuous <Continuous>  dextrose 40% Gel 15 Gram(s) Oral once  dextrose 50% Injectable 25 Gram(s) IV Push once  dextrose 50% Injectable 12.5 Gram(s) IV Push once  dextrose 50% Injectable 25 Gram(s) IV Push once  folic acid 1 milliGRAM(s) Oral daily  glucagon  Injectable 1 milliGRAM(s) IntraMuscular once  lactulose Syrup 20 Gram(s) Oral every 6 hours  midodrine. 10 milliGRAM(s) Oral every 8 hours  norepinephrine Infusion 0.05 MICROgram(s)/kG/Min (7.59 mL/Hr) IV Continuous <Continuous>  Phoxillum Filtration BK 4 / 2.5 5000 milliLiter(s) (900 mL/Hr) CRRT <Continuous>  PrismaSATE Dialysate BGK 4 / 2.5 5000 milliLiter(s) (900 mL/Hr) CRRT <Continuous>  PrismaSOL Filtration BGK 4 / 2.5 5000 milliLiter(s) (700 mL/Hr) CRRT <Continuous>  PrismaSOL Filtration BGK 4 / 2.5 5000 milliLiter(s) (200 mL/Hr) CRRT <Continuous>  PrismaSOL Filtration BGK 4 / 2.5 5000 milliLiter(s) (700 mL/Hr) CRRT <Continuous>  PrismaSOL Filtration BGK 4 / 2.5 5000 milliLiter(s) (200 mL/Hr) CRRT <Continuous>  rifAXIMin 550 milliGRAM(s) Oral two times a day  simethicone 80 milliGRAM(s) Chew two times a day  thiamine 100 milliGRAM(s) Oral daily  ursodiol Tablet 500 milliGRAM(s) Oral every 12 hours    MEDICATIONS  (PRN):  sodium chloride 0.9% lock flush 10 milliLiter(s) IV Push every 1 hour PRN Pre/post blood products, medications, blood draw, and to maintain line patency      ALLERGIES:  Allergies    No Known Allergies    Intolerances        LABS:                        6.7    21.29 )-----------( 49       ( 02 Sep 2021 02:12 )             20.2     09-02    134<L>  |  100  |  16  ----------------------------<  187<H>  4.0   |  19<L>  |  1.42<H>    Ca    9.1      02 Sep 2021 02:12  Phos  2.0     09-02  Mg     2.3     09-02    TPro  5.8<L>  /  Alb  3.9  /  TBili  21.0<H>  /  DBili  x   /  AST  114<H>  /  ALT  74<H>  /  AlkPhos  193<H>  09-02    PT/INR - ( 02 Sep 2021 02:12 )   PT: 25.2 sec;   INR: 2.18 ratio         PTT - ( 02 Sep 2021 02:12 )  PTT:58.5 sec      CAPILLARY BLOOD GLUCOSE          RADIOLOGY & ADDITIONAL TESTS: Reviewed.

## 2021-09-03 NOTE — PROGRESS NOTE ADULT - SUBJECTIVE AND OBJECTIVE BOX
[facility] DIVISION OF KIDNEY DISEASES AND HYPERTENSION -- FOLLOW UP NOTE  --------------------------------------------------------------------------------  HPI: 56-year-old Female with PMhx ETOH use disorder (last drink ~2 weeks ago), ETOH cirrhosis with ascites, splenomegaly, HLD, IBS, anxiety presented from [facility] to [facility] MICU on 8/29/21 for severe hyponatremia and JULIANA requiring CRRT. Nephrology consultation requested for JULIANA and hyponatremia. Pt. currently on CRRT since 8/29/21.      24 hour events/subjective: Pt. seen and examined at bedside. Appears calm and minimally conversant, moving extremities voluntarily Pt. transitioned from vasopressor support to only midodrine infusion now.      PAST HISTORY  --------------------------------------------------------------------------------  No significant changes to PMH, PSH, FHx, SHx, unless otherwise noted    ALLERGIES & MEDICATIONS  --------------------------------------------------------------------------------  Allergies    No Known Allergies    Intolerances    Standing Inpatient Medications  acetylcysteine IVPB 12 Gram(s) IV Intermittent <User Schedule>  albumin human 25% IVPB 50 milliLiter(s) IV Intermittent every 6 hours  chlorhexidine 4% Liquid 1 Application(s) Topical <User Schedule>  CRRT Treatment    <Continuous>  dexMEDEtomidine Infusion 0.2 MICROgram(s)/kG/Hr IV Continuous <Continuous>  dextrose 40% Gel 15 Gram(s) Oral once  dextrose 50% Injectable 25 Gram(s) IV Push once  dextrose 50% Injectable 12.5 Gram(s) IV Push once  dextrose 50% Injectable 25 Gram(s) IV Push once  folic acid 1 milliGRAM(s) Oral daily  glucagon  Injectable 1 milliGRAM(s) IntraMuscular once  lactulose Syrup 20 Gram(s) Oral every 6 hours  midodrine. 10 milliGRAM(s) Oral every 8 hours  norepinephrine Infusion 0.05 MICROgram(s)/kG/Min IV Continuous <Continuous>  Phoxillum Filtration BK 4 / 2.5 5000 milliLiter(s) CRRT <Continuous>  piperacillin/tazobactam IVPB.. 3.375 Gram(s) IV Intermittent every 8 hours  PrismaSOL Filtration BGK 4 / 2.5 5000 milliLiter(s) CRRT <Continuous>  PrismaSOL Filtration BGK 4 / 2.5 5000 milliLiter(s) CRRT <Continuous>  QUEtiapine 25 milliGRAM(s) Oral every 12 hours  rifAXIMin 550 milliGRAM(s) Oral two times a day  simethicone 80 milliGRAM(s) Chew two times a day  thiamine 100 milliGRAM(s) Oral daily  ursodiol Tablet 500 milliGRAM(s) Oral every 12 hours    PRN Inpatient Medications  sodium chloride 0.9% lock flush 10 milliLiter(s) IV Push every 1 hour PRN    REVIEW OF SYSTEMS  --------------------------------------------------------------------------------  Limited as pt. is somnolent.    VITALS/PHYSICAL EXAM  --------------------------------------------------------------------------------  T(C): 37.2 (09-03-21 @ 08:00), Max: 38 (09-02-21 @ 20:00)  HR: 110 (09-03-21 @ 08:00) (59 - 117)  BP: 125/59 (09-03-21 @ 08:00) (77/42 - 148/72)  RR: 15 (09-03-21 @ 08:00) (9 - 32)  SpO2: 97% (09-03-21 @ 08:00) (96% - 100%)  Wt(kg): --    09-02-21 @ 07:01  -  09-03-21 @ 07:00  --------------------------------------------------------  IN: 4633 mL / OUT: 3602 mL / NET: 1031 mL    09-03-21 @ 07:01  -  09-03-21 @ 08:32  --------------------------------------------------------  IN: 119.3 mL / OUT: 121 mL / NET: -1.7 mL    Physical Exam:  	Gen: NADa, appears somnolent  	HEENT: MMM,icteric  	Pulm: CTA B/L  	CV: S1S2  	Abd: Soft, +BS   	Ext: No LE edema B/L  	Neuro: Awake, moving extremities voluntarily  	Skin: Warm and dry  	Vascular access:    LABS/STUDIES  --------------------------------------------------------------------------------              7.7    24.81 >-----------<  50       [09-03-21 @ 00:15]              22.9     136  |  101  |  16  ----------------------------<  202      [09-03-21 @ 03:05]  3.8   |  16  |  1.18        Ca     8.4     [09-03-21 @ 03:05]      Mg     2.1     [09-03-21 @ 03:05]      Phos  2.7     [09-03-21 @ 03:05]    TPro  5.7  /  Alb  3.7  /  TBili  21.3  /  DBili  x   /  AST  84  /  ALT  61  /  AlkPhos  173  [09-03-21 @ 03:05]    PT/INR: PT 22.4 , INR 1.93       [09-03-21 @ 00:15]  PTT: 40.2       [09-03-21 @ 00:15]          [09-02-21 @ 14:37]    Creatinine Trend:  SCr 1.18 [09-03 @ 03:05]  SCr 1.20 [09-02 @ 21:29]  SCr 1.17 [09-02 @ 14:37]  SCr 1.14 [09-02 @ 08:30]  SCr 1.42 [09-02 @ 02:12]    Urinalysis - [09-02-21 @ 22:57]      Color Dark Yellow / Appearance Turbid / SG 1.027 / pH 5.5      Gluc Trace / Ketone Small  / Bili Moderate / Urobili Negative       Blood Large / Protein 30 mg/dL / Leuk Est Small / Nitrite Negative      RBC 11 / WBC 2 / Hyaline 3 / Gran  / Sq Epi  / Non Sq Epi 1 / Bacteria Negative    Urine Sodium 20      [08-30-21 @ 00:22]  Urine Potassium 23      [08-30-21 @ 00:22]  Urine Osmolality 340      [08-30-21 @ 00:22]    Iron 119, TIBC 158, %sat 75      [08-29-21 @ 23:03]  Ferritin 1032      [09-01-21 @ 10:57]  TSH 1.38      [08-29-21 @ 23:03]    HBsAg Nonreact      [08-29-21 @ 23:26]  HCV 1.08, Weakly Reactive Hepatitis C AB  S/CO Ratio                        Interpretation  < 1.00                                   Non-Reactive  1.00 - 4.99                         Weakly-Reactive  >= 5.00                                Reactive  Non-Reactive: A person with a non-reactive HCV antibody result is  considered uninfected.  No further action is needed unless recent  infection is suspected.  In these cases, consider repeat testing later to  detect seroconversion..  Weakly-Reactive: HCV antibody test is abnormal, HCV RNA Qualitative test  will follow.  Reactive: HCV antibody test is abnormal, HCV RNA Qualitative test will  follow.  Note: HCV antibody testing is performed on the [employer]  system.      [08-29-21 @ 23:26]    FARHANA: titer Negative, pattern --      [09-01-21 @ 10:48]

## 2021-09-03 NOTE — ADVANCED PRACTICE NURSE CONSULT - REASON FOR CONSULT
Requested by staff to assess skin status of pt with IAD. PMH is noted:  57 yo f pmhx ETOH abuse (last drink ~2 weeks ago), ETOH cirrhosis c/b ascites, splenomegaly, IBS who was transferred from [facility] ICU 8/29. She initially presented to [facility] ED from home after being sent in by her GI Dr. López for abnl labs.  Per patient over the last week she has noticed her sclera/skin to be bright orange, fatigued, lightheaded, nausea, poor appetite and abdominal bloating. In [facility] ED, patient found to be grossly jaundiced, labs significant for Na 114, BUN/Cr 146/16.3, serum co2 14, tbili 27.1, ast/alt 205/113. While in [facility] ICU, pt was treated for alcoholic hepatitis s/p solumedrol, hepatorenal syndrome s/p octreotide, albumin, and acute renal failure. Pt was seen by nephrology, who recommended transfer to tertiary care center for initiation of CVVH. Hyponatremia was treated with NS. Pt was also maintained on NaHCO3 drip for metabolic acidosis. Lactulose was titrated to 20mg TID. Spironolactone was not given due to ARF in s/o HRS. GI was consulted for alcoholic hepatitis as well as possible liver transplant eval, which also agreed to transfer to tertiary center for further evaluation. An ICU to ICU transfer was initiated and pt was transferred to [facility] 8/29 and was started on CVVHDF. Hepatology was consulted due to decompensated ETOH cirrhosis. OP GI Dr. Williams López.

## 2021-09-03 NOTE — PROGRESS NOTE ADULT - SUBJECTIVE AND OBJECTIVE BOX
Chief Complaint:  Patient is a 56y old  Female who presents with a chief complaint of CRRT (03 Sep 2021 08:32)      Interval Events: Febrile overnight w Tmax 100, also now persistently tachycardic w worsening leukocytosis.      Hospital Medications:  acetylcysteine IVPB 12 Gram(s) IV Intermittent <User Schedule>  albumin human 25% IVPB 50 milliLiter(s) IV Intermittent every 6 hours  chlorhexidine 4% Liquid 1 Application(s) Topical <User Schedule>  CRRT Treatment    <Continuous>  dexMEDEtomidine Infusion 0.2 MICROgram(s)/kG/Hr IV Continuous <Continuous>  dextrose 40% Gel 15 Gram(s) Oral once  dextrose 50% Injectable 25 Gram(s) IV Push once  dextrose 50% Injectable 12.5 Gram(s) IV Push once  dextrose 50% Injectable 25 Gram(s) IV Push once  folic acid 1 milliGRAM(s) Oral daily  glucagon  Injectable 1 milliGRAM(s) IntraMuscular once  lactulose Syrup 20 Gram(s) Oral every 6 hours  midodrine. 10 milliGRAM(s) Oral every 8 hours  norepinephrine Infusion 0.05 MICROgram(s)/kG/Min IV Continuous <Continuous>  Phoxillum Filtration BK 4 / 2.5 5000 milliLiter(s) CRRT <Continuous>  piperacillin/tazobactam IVPB.. 3.375 Gram(s) IV Intermittent every 8 hours  PrismaSOL Filtration BGK 4 / 2.5 5000 milliLiter(s) CRRT <Continuous>  PrismaSOL Filtration BGK 4 / 2.5 5000 milliLiter(s) CRRT <Continuous>  QUEtiapine 25 milliGRAM(s) Oral every 12 hours  rifAXIMin 550 milliGRAM(s) Oral two times a day  simethicone 80 milliGRAM(s) Chew two times a day  sodium chloride 0.9% lock flush 10 milliLiter(s) IV Push every 1 hour PRN  thiamine 100 milliGRAM(s) Oral daily  ursodiol Tablet 500 milliGRAM(s) Oral every 12 hours      PMHX/PSHX:  Anxiety    GERD (gastroesophageal reflux disease)    Constipation    Fatty liver    IBS (irritable bowel syndrome)    Liver cirrhosis    HLD (hyperlipidemia)    Claustrophobia    ETOH abuse    Lipoma    H/O colonoscopy    H/O endoscopy    Lipoma of back          ROS: unable to obtain as pt was somnolent      PHYSICAL EXAM:     GENERAL:  Appears stated age, +somnolent  HEENT:  NC/AT, eyes closed  CHEST:  Full & symmetric excursion, no increased effort, breath sounds clear  HEART:  +tachycardic, Regular rhythm, S1, S2, no murmur/rub/S3/S4  ABDOMEN:  Soft, non-tender, non-distended, normoactive bowel sounds  EXTREMITIES:  1+ BL LE edema  SKIN:  +jaundiced  NEURO:  +somnolent and difficult to arouse, on sedation      Vital Signs:  Vital Signs Last 24 Hrs  T(C): 37.2 (03 Sep 2021 08:00), Max: 38 (02 Sep 2021 20:00)  T(F): 99 (03 Sep 2021 08:00), Max: 100.4 (02 Sep 2021 20:00)  HR: 112 (03 Sep 2021 09:00) (59 - 117)  BP: 114/55 (03 Sep 2021 09:00) (77/42 - 148/72)  BP(mean): 80 (03 Sep 2021 09:) (54 - 94)  RR: 16 (03 Sep 2021 09:00) (9 - 32)  SpO2: 97% (03 Sep 2021 09:00) (96% - 100%)  Daily     Daily     LABS:                        7.7    24.81 )-----------( 50       ( 03 Sep 2021 00:15 )             22.9     03    136  |  101  |  16  ----------------------------<  202<H>  3.8   |  16<L>  |  1.18    Ca    8.4      03 Sep 2021 03:05  Phos  2.7       Mg     2.1         TPro  5.7<L>  /  Alb  3.7  /  TBili  21.3<H>  /  DBili  x   /  AST  84<H>  /  ALT  61<H>  /  AlkPhos  173<H>  03    LIVER FUNCTIONS - ( 03 Sep 2021 03:05 )  Alb: 3.7 g/dL / Pro: 5.7 g/dL / ALK PHOS: 173 U/L / ALT: 61 U/L / AST: 84 U/L / GGT: x           PT/INR - ( 03 Sep 2021 00:15 )   PT: 22.4 sec;   INR: 1.93 ratio         PTT - ( 03 Sep 2021 00:15 )  PTT:40.2 sec  Urinalysis Basic - ( 02 Sep 2021 22:57 )    Color: Dark Yellow / Appearance: Turbid / S.027 / pH: x  Gluc: x / Ketone: Small  / Bili: Moderate / Urobili: Negative   Blood: x / Protein: 30 mg/dL / Nitrite: Negative   Leuk Esterase: Small / RBC: 11 /hpf / WBC 2 /HPF   Sq Epi: x / Non Sq Epi: 1 /hpf / Bacteria: Negative          Imaging: No new abdominal imaging

## 2021-09-03 NOTE — ADVANCED PRACTICE NURSE CONSULT - RECOMMEDATIONS
Will recommend the followin. B/l buttocks, b/l groin, b/l thighs: apply Advanced Cavilon M-W-F. routine pericare between applications  2. continue with turning and positioning  3. Complete Cair boots  4. Nutrition support as pt condition allows  Tx plan discussed with RN

## 2021-09-03 NOTE — PROGRESS NOTE ADULT - ATTENDING COMMENTS
Patient seen and examined. Patient critically ill requiring frequent bedside visits with therapy change. Patient is a 56F EtOH abuse, cirrhosis/portal HTN, IBS, and HLD who is transferred from [facility] for liver and renal dysfunction. She was admitted to MICU for concern of hepatorenal syndrome and initiation of CRRT.    1. Shock state - in the setting of hepatorenal syndrome will aim for a MAP > 65 with Levophed and Midodrine.   - Continue Ceftriaxone for E. coli UTI  2. Acute renal Failure - likely hepatorenal now on CRRT. Will remove fluid with CRRT as patient tolerates. Nephrology followup. Serial BMP. Plan for CRRT through Saturday and then attempt at HD next Monday.  3. Acute Liver failure - patient with elevated bilirubin in the setting of known EtOh use. Will continue NAC x 5 days. Will hold off steroids at this time per d/w Hepatology.  - US with possible portal vein clots - followup official CT abdomen/pelvis with patent portal vessesl - no AC  - HCV weakly positive  - cont Albumin and Ursodiol  - Asterixis - Lactulose and Rifaximin - NGT placed and patient able to get feeds/meds  - prior EGD without evidence of varices 2019  - Small ascites without safe pocket for bedside paracentesis  4. Pulmonary - stable and on RA. RVP with adenovirus  5. Infectious Disease - E. coli UTI  - BCx 8/28 now growing GNR - antibiotics changed to Zosyn. Will need followup cultures - ? if E. coli  6. Encephalopathy - due to liver disease and anxiety. Wean precedex as able    Long term prognosis guarded. Nephrology and Hepatology followup.

## 2021-09-03 NOTE — PROGRESS NOTE ADULT - ASSESSMENT
56 y.o. F with PMH of ETOH misuse, ETOH cirrhosis, HLD, IBS, anxiety, presents with decompensated alcoholic cirrhosis c/b HRS, likely contributing to acute renal failure and hyponatremia s/p now on CRRT, transferred to [facility] MICU, course c/b hepatic encephalopathy    9/2 target MAPs 65s, c/w midodrine, wean levo. Discuss with hep whether to c/w albumin gtt.  BMPq6h while on CRRT, CBC q12. start seroquel 25q12 for agitation. Called lab for hemolysis labs/schistocyte eval.    Neuro  aaox0, lethargic, but awakens (but enceophalopathic) when off precedex    #Delirium  -seroquel 25 q12  -c/w precedex, has been needed to prevent pt from pulling at lines    #Hepatic encephalopathy  - lactulose 20q8, rifaximin  BID  -titrate to 4-5BM daily  -stool count    # ETOH Misuse  - Continue to monitor for w/d symptoms; pt reported last drink was 2.5 wks ago  - SW consult & cessation counseling   - c/w thiamine and folate     # Anxiety  - C/w Xanax PRN   - See ISTOP   -c/w precedex, has been needed to prevent pt from pulling at lines    Cardiovascular  #Hypotension - on levophed  - Can now decrease MAP goal to 65, put unlikely to have renal recovery  - midodrine 10 q8    Pulmonary  - Normal O2 Saturation on room air, monitor for volume overload in s/o ARF and hypervolemia   -monitor ability to protect airway    GI  # Hepatorenal syndrome   - See Renal     # ETOH Cirrhosis   - s/p solumedrol 40mg IV qD  -- discontinued as per hepatology  - Maddrey score: 52.4 indicative of poor prognosis  - MELD Score: 38 --?65-66% risk of 90 day mortality   - Pt anuric now, unclear if spironolactone will be helpful  - small ascites on U/S abd, no pockets available to tap    - Trend liver labs daily CMP   -  NAC - started 8/30 - 9/3 for 5 days    Endoscopy in 2019: no varices, stomach with congestion c/w gastritis vs portal gastropathy    Negative Hep B surface Ag, Hep B core Ab, Hep A IgM  Weakly reactive Hep C Ab  [ x ]Hep E IgM sent  [x ]check serum FARHANA, smooth muscle Ab, anti-LKM-1 Ab, alpha-1 antitrypsin, ferritin, ceruloplasmin, copper, immunoglobulin panel (IgG, IgA, IgM)    # Hep C weakly reactive, but Hep C RNA viral load negative    #R/o portal vein thrombosis  triple phase CT A/P to r/o portal vein thrombosis --- negative --> Hep gtt discontinued      Urinary retention  s/p metzger DC 8/30  bladder scan q8h  s/p straight cath x1 9/1 am with 100cc    Renal  # Hepatorenal syndrome, unlikely to have renal recovery  - Suspect acute renal failure 2/2 HRS in s/o alcoholic hepatitis and cirrhosis   - s/p octreatride gtt  -c/w albumin for now [] will discuss with hep  - On CRRT  - Baseline Cr 2.5  - Monitor Cr and avoid nephrotoxic agents  - Renally dose meds   - MAP goal 70  - Monitor I/Os   [ ] discuss with renal about when to start iHD  [ ] bmp q6h while on CRRT    # Hyponatremia - 114 on presentation, now resolved.  - Euvolemic on exam; possibly 2/2 ARF and or poor oral intake in s/o ETOH use   - Angela 20, suggestive of decreased PO intake    ID  CXR clear lungs  Leukocytosis rising while on ceftriaxone, unclear cause.    #E.coli UTI  -CTX 8/30 -xx -- plan for 7d  ->100k E.coli CFU  sensitive to CTX    # SBP PPx   - Minimal ascites seen on POCUS 9/1 -- nothing to tap  -Discussed with GI attending, no need to consult IR right now as nothing to tap  -On CTX for UTI    Heme  # Macrocytic anemia & Thrombocytopenia   - Likely in s/o poor nutritional status from ETOH use  - B12  normal, folate iron studies  - Folate thiamine supplement    # Coagulopathy   - In s/o decompensated alcoholic cirrhosis and ARF   - Give DDAVP for likely plt dysfunction   -[ ] Trend daily PT INR PTT   - INR worsening overall    Ethics  # FULL code   -  is surrogate decision maker    Michele updated 9/1 56 y.o. F with PMH of ETOH misuse, ETOH cirrhosis, HLD, IBS, anxiety, presents with decompensated alcoholic cirrhosis c/b HRS, likely contributing to acute renal failure and hyponatremia s/p now on CRRT, transferred to [facility] MICU, course c/b hepatic encephalopathy    9/2 target MAPs 65s, c/w midodrine, wean levo. Discuss with hep whether to c/w albumin gtt.  BMPq6h while on CRRT, CBC q12. start seroquel 25q12 for agitation. Called lab for hemolysis labs/schistocyte eval.    Neuro  aaox0, lethargic, but awakens (but enceophalopathic) when off precedex    #Delirium  -seroquel 25 q12  -c/w precedex, has been needed to prevent pt from pulling at lines; wean     #Hepatic encephalopathy  - lactulose, rifaximin  BID  -titrate to 4-5BM daily  -stool count    # ETOH Misuse  - Continue to monitor for w/d symptoms; pt reported last drink was 2.5 wks ago  - SW consult & cessation counseling   - c/w thiamine and folate     # Anxiety  - C/w Xanax PRN   - See ISTOP   -c/w precedex, has been needed to prevent pt from pulling at lines    Cardiovascular  #Hypotension -on levophed  - MAP goal to 65, put unlikely to have renal recovery  - midodrine 10 q8  -off of levophed    Pulmonary  - Normal O2 Saturation on room air, monitor for volume overload in s/o ARF and hypervolemia   -monitor ability to protect airway    GI  # Hepatorenal syndrome   - See Renal     # ETOH Cirrhosis   - s/p solumedrol 40mg IV qD  -- discontinued as per hepatology  - Maddrey score: 52.4 indicative of poor prognosis  - MELD Score: 38 --?65-66% risk of 90 day mortality   - small ascites on U/S abd, no pockets available to tap    - Trend liver labs daily CMP   - s/p NAC - started 8/30 - 9/3 for 5 days    Endoscopy in 2019: no varices, stomach with congestion c/w gastritis vs portal gastropathy    Negative Hep B surface Ag, Hep B core Ab, Hep A IgM  Weakly reactive Hep C Ab  [ x ]Hep E IgM sent  [x ]check serum FARHANA, smooth muscle Ab, anti-LKM-1 Ab, alpha-1 antitrypsin, ferritin, ceruloplasmin, copper, immunoglobulin panel (IgG, IgA, IgM)    # Hep C weakly reactive, but Hep C RNA viral load negative    #R/o portal vein thrombosis  triple phase CT A/P to r/o portal vein thrombosis --- negative --> Hep gtt discontinued      Urinary retention  s/p metzger DC 8/30  bladder scan q8h  s/p straight cath x1 9/1 am with 100cc        Renal  # Hepatorenal syndrome, unlikely to have renal recovery as pt remains anuric  - Suspect acute renal failure 2/2 HRS in s/o alcoholic hepatitis and cirrhosis   - s/p octreatride gtt  -s/p albumin   - On CRRT, continue through weekend, plan for iHD on Monday 9/6  - Baseline Cr 2.5  - Monitor Cr and avoid nephrotoxic agents  - Renally dose meds   - MAP goal 65  - Monitor I/Os   [ ] bmp q6h while on CRRT    # Hyponatremia - 114 on presentation, now resolved.  - Euvolemic on exam; possibly 2/2 ARF and or poor oral intake in s/o ETOH use   - Angela 20, suggestive of decreased PO intake    ID  CXR clear lungs  Leukocytosis rising while on ceftriaxone, unclear cause.    #E.coli bacteremia likely 2/2 E.coli UTI  -CTX 8/30 -xx -- plan for 7d  ->100k E.coli CFU  sensitive to CTX    # SBP PPx   - Minimal ascites seen on POCUS 9/1 -- nothing to tap  -Discussed with GI attending, no need to consult IR right now as nothing to tap  -On CTX for UTI    Heme  # Macrocytic anemia & Thrombocytopenia   - Likely in s/o poor nutritional status from ETOH use  - B12  normal, folate iron studies  - Folate thiamine supplement    # Coagulopathy   - In s/o decompensated alcoholic cirrhosis and ARF   - Give DDAVP for likely plt dysfunction   -[ ] Trend daily PT INR PTT   - INR worsening overall    Ethics  # FULL code   -  is surrogate decision maker    Michele updated 9/1

## 2021-09-03 NOTE — CHART NOTE - NSCHARTNOTEFT_GEN_A_CORE
: Jamey Smith    INDICATION: Ascites - eval for paracentesis    PROCEDURE:  [x ] LIMITED ABDOMINAL    FINDINGS:  Small ascites, simple appearing, without a safe window for thoracentesis      INTERPRETATION:  Small ascites more in right paracolic gutter with intervening bowel - no safe window for bedside paracentesis    Images saved to [facility]

## 2021-09-03 NOTE — PROGRESS NOTE ADULT - ATTENDING COMMENTS
55 yo F w/ history active alcohol use disorder (last drink about 2 weeks ago), known ETOH cirrhosis c/b ascites p/w [facility] with worsening jaundice and renal failure requiring renal replacement therapy transferred here further care.    # Decompensated alcoholic hepatitis on top of ETOH cirrhosis  She is currently in the ICU and critically ill.   Not intubated but needing sedation for agitation.  She has continued to drink despite knowledge of her liver disease for several years and appears to have a lack of insight. She is not a transplant candidate currently and will need prolonged sobriety and attendance of a relapse prevention program.  GI physician Dr. Williams López.  NAC drip for alcoholic hepatitis (8/30-9/4/21)  Ursodiol 500 mg po BID (9/1- ) .    # Renal failure, not making urine  CRRT per ICU team but suspect HRS but plan to transition to intermittent HD if tolerated.    # Septic shock  Now off pressors.  Zosyn per ICU team  GNR bacteremia and UTI, recommend repeating blood cx to document clearance. Suspect urosepsis

## 2021-09-03 NOTE — PROGRESS NOTE ADULT - SUBJECTIVE AND OBJECTIVE BOX
MICU PROGRESS NOTE    INTERVAL HPI/OVERNIGHT EVENTS:    SUBJECTIVE:     OBJECTIVE:    VITAL SIGNS:  ICU Vital Signs Last 24 Hrs  T(C): 36.5 (03 Sep 2021 04:00), Max: 38 (02 Sep 2021 20:00)  T(F): 97.7 (03 Sep 2021 04:00), Max: 100.4 (02 Sep 2021 20:00)  HR: 109 (03 Sep 2021 07:45) (59 - 117)  BP: 132/59 (03 Sep 2021 07:45) (77/42 - 148/72)  BP(mean): 85 (03 Sep 2021 07:45) (54 - 94)  ABP: --  ABP(mean): --  RR: 17 (03 Sep 2021 07:45) (9 - 32)  SpO2: 97% (03 Sep 2021 07:45) (96% - 100%)      VENTS:      I&O:     @ 07:01  -  03 @ 07:00  --------------------------------------------------------  IN: 4633 mL / OUT: 3602 mL / NET: 1031 mL     @ 07:01  -   @ 07:57  --------------------------------------------------------  IN: 119.3 mL / OUT: 0 mL / NET: 119.3 mL        PHYSICAL EXAM:  GENERAL: NAD, conversant  CHEST/LUNG: Clear to auscultation bilaterally; No crackles, rhonchi, wheezing, or rubs  HEART: Regular rate and rhythm; No murmurs, rubs, or gallops  ABDOMEN: Soft, Nontender, Nondistended; Bowel sounds present  EXTREMITIES:  2+ Peripheral Pulses, No clubbing, cyanosis, or edema  SKIN: No rashes or lesions  NERVOUS SYSTEM:  Alert & Oriented, Good concentration      LINES:                                       MEDICATIONS:  MEDICATIONS  (STANDING):  acetylcysteine IVPB 12 Gram(s) IV Intermittent <User Schedule>  albumin human 25% IVPB 50 milliLiter(s) IV Intermittent every 6 hours  chlorhexidine 4% Liquid 1 Application(s) Topical <User Schedule>  CRRT Treatment    <Continuous>  dexMEDEtomidine Infusion 0.2 MICROgram(s)/kG/Hr (4.05 mL/Hr) IV Continuous <Continuous>  dextrose 40% Gel 15 Gram(s) Oral once  dextrose 50% Injectable 25 Gram(s) IV Push once  dextrose 50% Injectable 12.5 Gram(s) IV Push once  dextrose 50% Injectable 25 Gram(s) IV Push once  folic acid 1 milliGRAM(s) Oral daily  glucagon  Injectable 1 milliGRAM(s) IntraMuscular once  lactulose Syrup 20 Gram(s) Oral every 6 hours  midodrine. 10 milliGRAM(s) Oral every 8 hours  norepinephrine Infusion 0.05 MICROgram(s)/kG/Min (7.59 mL/Hr) IV Continuous <Continuous>  Phoxillum Filtration BK 4 / 2.5 5000 milliLiter(s) (900 mL/Hr) CRRT <Continuous>  piperacillin/tazobactam IVPB.. 3.375 Gram(s) IV Intermittent every 8 hours  PrismaSOL Filtration BGK 4 / 2.5 5000 milliLiter(s) (700 mL/Hr) CRRT <Continuous>  PrismaSOL Filtration BGK 4 / 2.5 5000 milliLiter(s) (200 mL/Hr) CRRT <Continuous>  QUEtiapine 25 milliGRAM(s) Oral every 12 hours  rifAXIMin 550 milliGRAM(s) Oral two times a day  simethicone 80 milliGRAM(s) Chew two times a day  thiamine 100 milliGRAM(s) Oral daily  ursodiol Tablet 500 milliGRAM(s) Oral every 12 hours    MEDICATIONS  (PRN):  sodium chloride 0.9% lock flush 10 milliLiter(s) IV Push every 1 hour PRN Pre/post blood products, medications, blood draw, and to maintain line patency      ALLERGIES:  Allergies    No Known Allergies    Intolerances        LABS:                        7.7    24.81 )-----------( 50       ( 03 Sep 2021 00:15 )             22.9     09-03    136  |  101  |  16  ----------------------------<  202<H>  3.8   |  16<L>  |  1.18    Ca    8.4      03 Sep 2021 03:05  Phos  2.7       Mg     2.1         TPro  5.7<L>  /  Alb  3.7  /  TBili  21.3<H>  /  DBili  x   /  AST  84<H>  /  ALT  61<H>  /  AlkPhos  173<H>      PT/INR - ( 03 Sep 2021 00:15 )   PT: 22.4 sec;   INR: 1.93 ratio         PTT - ( 03 Sep 2021 00:15 )  PTT:40.2 sec  Urinalysis Basic - ( 02 Sep 2021 22:57 )    Color: Dark Yellow / Appearance: Turbid / S.027 / pH: x  Gluc: x / Ketone: Small  / Bili: Moderate / Urobili: Negative   Blood: x / Protein: 30 mg/dL / Nitrite: Negative   Leuk Esterase: Small / RBC: 11 /hpf / WBC 2 /HPF   Sq Epi: x / Non Sq Epi: 1 /hpf / Bacteria: Negative        CAPILLARY BLOOD GLUCOSE          RADIOLOGY & ADDITIONAL TESTS: Reviewed. MICU PROGRESS NOTE    INTERVAL HPI/OVERNIGHT EVENTS:  Last evening, pt febrile to 38.0. Recultured. UA negative. 10mcg fentanyl given presumably for agitation.  Off levophed.      SUBJECTIVE:   This AM more awake, able to say 1-2 word answers to questions. However did not answer oreintation questions.    OBJECTIVE:    VITAL SIGNS:  ICU Vital Signs Last 24 Hrs  T(C): 36.5 (03 Sep 2021 04:00), Max: 38 (02 Sep 2021 20:00)  T(F): 97.7 (03 Sep 2021 04:00), Max: 100.4 (02 Sep 2021 20:00)  HR: 109 (03 Sep 2021 07:45) (59 - 117)  BP: 132/59 (03 Sep 2021 07:45) (77/42 - 148/72)  BP(mean): 85 (03 Sep 2021 07:45) (54 - 94)  ABP: --  ABP(mean): --  RR: 17 (03 Sep 2021 07:45) (9 - 32)  SpO2: 97% (03 Sep 2021 07:45) (96% - 100%)      VENTS:      I&O:     @ 07: @ 07:00  --------------------------------------------------------  IN: 4633 mL / OUT: 3602 mL / NET: 1031 mL     @ 07: @ 07:57  --------------------------------------------------------  IN: 119.3 mL / OUT: 0 mL / NET: 119.3 mL        PHYSICAL EXAM:  GENERAL: sleeping, lethargic, Jaundice  HEENT: scleral icterus  CHEST/LUNG: Clear to auscultation bilaterally; No crackles, rhonchi, wheezing, or rubs  HEART: Regular rate and rhythm; No murmurs, rubs, or gallops  ABDOMEN: Soft, Nontender, +distended; Bowel sounds present  EXTREMITIES:  no edema  SKIN: No rashes or lesions  NERVOUS SYSTEM: aaox0 lethargic, states "yes/no" to questions  LINES:        PANTERA Barone central line                               MEDICATIONS:  MEDICATIONS  (STANDING):  acetylcysteine IVPB 12 Gram(s) IV Intermittent <User Schedule>  albumin human 25% IVPB 50 milliLiter(s) IV Intermittent every 6 hours  chlorhexidine 4% Liquid 1 Application(s) Topical <User Schedule>  CRRT Treatment    <Continuous>  dexMEDEtomidine Infusion 0.2 MICROgram(s)/kG/Hr (4.05 mL/Hr) IV Continuous <Continuous>  dextrose 40% Gel 15 Gram(s) Oral once  dextrose 50% Injectable 25 Gram(s) IV Push once  dextrose 50% Injectable 12.5 Gram(s) IV Push once  dextrose 50% Injectable 25 Gram(s) IV Push once  folic acid 1 milliGRAM(s) Oral daily  glucagon  Injectable 1 milliGRAM(s) IntraMuscular once  lactulose Syrup 20 Gram(s) Oral every 6 hours  midodrine. 10 milliGRAM(s) Oral every 8 hours  norepinephrine Infusion 0.05 MICROgram(s)/kG/Min (7.59 mL/Hr) IV Continuous <Continuous>  Phoxillum Filtration BK 4 / 2.5 5000 milliLiter(s) (900 mL/Hr) CRRT <Continuous>  piperacillin/tazobactam IVPB.. 3.375 Gram(s) IV Intermittent every 8 hours  PrismaSOL Filtration BGK 4 / 2.5 5000 milliLiter(s) (700 mL/Hr) CRRT <Continuous>  PrismaSOL Filtration BGK 4 / 2.5 5000 milliLiter(s) (200 mL/Hr) CRRT <Continuous>  QUEtiapine 25 milliGRAM(s) Oral every 12 hours  rifAXIMin 550 milliGRAM(s) Oral two times a day  simethicone 80 milliGRAM(s) Chew two times a day  thiamine 100 milliGRAM(s) Oral daily  ursodiol Tablet 500 milliGRAM(s) Oral every 12 hours    MEDICATIONS  (PRN):  sodium chloride 0.9% lock flush 10 milliLiter(s) IV Push every 1 hour PRN Pre/post blood products, medications, blood draw, and to maintain line patency      ALLERGIES:  Allergies    No Known Allergies    Intolerances        LABS:                        7.7    24.81 )-----------( 50       ( 03 Sep 2021 00:15 )             22.9         136  |  101  |  16  ----------------------------<  202<H>  3.8   |  16<L>  |  1.18    Ca    8.4      03 Sep 2021 03:05  Phos  2.7       Mg     2.1         TPro  5.7<L>  /  Alb  3.7  /  TBili  21.3<H>  /  DBili  x   /  AST  84<H>  /  ALT  61<H>  /  AlkPhos  173<H>      PT/INR - ( 03 Sep 2021 00:15 )   PT: 22.4 sec;   INR: 1.93 ratio         PTT - ( 03 Sep 2021 00:15 )  PTT:40.2 sec  Urinalysis Basic - ( 02 Sep 2021 22:57 )    Color: Dark Yellow / Appearance: Turbid / S.027 / pH: x  Gluc: x / Ketone: Small  / Bili: Moderate / Urobili: Negative   Blood: x / Protein: 30 mg/dL / Nitrite: Negative   Leuk Esterase: Small / RBC: 11 /hpf / WBC 2 /HPF   Sq Epi: x / Non Sq Epi: 1 /hpf / Bacteria: Negative        CAPILLARY BLOOD GLUCOSE          RADIOLOGY & ADDITIONAL TESTS: Reviewed.

## 2021-09-03 NOTE — PROGRESS NOTE ADULT - ASSESSMENT
55 yo f pmhx ETOH abuse (last drink ~2 weeks ago), ETOH cirrhosis c/b ascites, splenomegaly, IBS who was transferred from [place] ICU 8/29. She initially presented to [place] ED from home after being sent in by her GI Dr. López for abnl labs.  Per patient over the last week she has noticed her sclera/skin to be bright orange, fatigued, lightheaded, nausea, poor appetite and abdominal bloating. In [place] ED, patient found to be grossly jaundiced, labs significant for Na 114, BUN/Cr 146/16.3, serum co2 14, tbili 27.1, ast/alt 205/113. While in [place] ICU, pt was treated for alcoholic hepatitis s/p solumedrol, hepatorenal syndrome s/p octreotide, albumin, and acute renal failure. Pt was seen by nephrology, who recommended transfer to tertiary care center for initiation of CVVH. Hyponatremia was treated with NS. Pt was also maintained on NaHCO3 drip for metabolic acidosis. Lactulose was titrated to 20mg TID. Spironolactone was not given due to ARF in s/o HRS. GI was consulted for alcoholic hepatitis as well as possible liver transplant eval, which also agreed to transfer to tertiary center for further evaluation. An ICU to ICU transfer was initiated and pt was transferred to [facility] 8/29 and was started on CVVHDF. Hepatology was consulted due to decompensated ETOH cirrhosis. OP GI Dr. Williams López.     Impression:  #Decompensated ETOH cirrhosis- MELDNa 39 (on CVVHDF); MDF 58.5 (poor prognosis); HAV/HBV neg  -Ascites: no ascites on US abd 8/28  -Varices: Last EGD 04/2019 w/o EV  -HCC: no focal lesions seen on US abd +doppler 8/28  -HE: oriented to person on exam, +asterixis on exam  #E coli UTI- UA with +WBC>150 and +LE: urine cx with >100K CFU E coli; s/p CTX (8/31-9/1), now on Zosyn (9/2-->)  #+HCV Ab- pending RNA PCR    Recommendations:  - please order Hep C RNA PCR, Hep E IgM  - consult IR for diagnostic para, need to evaluate for SBP in the setting of worsening leukocytosis, fevers, tachycardia  - f/u bl cx x2 (9/2), please order CXR; if pt develops diarrhea please send GI PCR and C diff  - rifaxamin 550 mg BID, lactulose 20 mg q6h; titrate to goal 3-4 BM/day  - midodrine 10 mg TID  - ursodiol 500 mg BID  - c/w NAC (8/30-9/4)  Initiate N-acetylcysteine as a continuous infusion in the following steps  1) 150mg/kg in 250 mL 5% dextrose over 30 mins  2) 50mg/kg in 500 mL 5% dextrose solution over 4 hours  3) 100mg/kg in 1000 mL 5% dextrose solution over 16 hours  4) 100mg/kg in 1000 mL 5% dextrose solution over days 2-5  - continue albumin 25% 50 mL q6h  - trend INR, platelet and CMP daily       We will continue to follow.    Berenice Rincon MD  GI Fellow, PGY-4  Available via [employer] Teams    NON-URGENT CONSULTS:  Please email [email or web address]  AT NIGHT AND ON WEEKENDS:  Contact on-call GI fellow via answering service ([phone]) from 5pm-8am and on weekends/holidays  MONDAY-FRIDAY 8AM-5PM:  Pager# [phone] ([facility]) or [phone] ([facility])  GI Phone# [phone] ([facility]) 57 yo f pmhx ETOH abuse (last drink ~2 weeks ago), ETOH cirrhosis c/b ascites, splenomegaly, IBS who was transferred from [place] ICU 8/29. She initially presented to [place] ED from home after being sent in by her GI Dr. López for abnl labs.  Per patient over the last week she has noticed her sclera/skin to be bright orange, fatigued, lightheaded, nausea, poor appetite and abdominal bloating. In [place] ED, patient found to be grossly jaundiced, labs significant for Na 114, BUN/Cr 146/16.3, serum co2 14, tbili 27.1, ast/alt 205/113. While in [place] ICU, pt was treated for alcoholic hepatitis s/p solumedrol, hepatorenal syndrome s/p octreotide, albumin, and acute renal failure. Pt was seen by nephrology, who recommended transfer to tertiary care center for initiation of CVVH. Hyponatremia was treated with NS. Pt was also maintained on NaHCO3 drip for metabolic acidosis. Lactulose was titrated to 20mg TID. Spironolactone was not given due to ARF in s/o HRS. GI was consulted for alcoholic hepatitis as well as possible liver transplant eval, which also agreed to transfer to tertiary center for further evaluation. An ICU to ICU transfer was initiated and pt was transferred to [facility] 8/29 and was started on CVVHDF. Hepatology was consulted due to decompensated ETOH cirrhosis. OP GI Dr. Williams López.     Impression:  #Decompensated ETOH cirrhosis- MELDNa 39 (on CVVHDF); MDF 58.5 (poor prognosis); HAV/HBV neg  -Ascites: no ascites on US abd 8/28  -Varices: Last EGD 04/2019 w/o EV  -HCC: no focal lesions seen on US abd +doppler 8/28  -HE: oriented to person on exam, +asterixis on exam  #E coli UTI- UA with +WBC>150 and +LE: urine cx with >100K CFU E coli; s/p CTX (8/31-9/1), now on Zosyn (9/2-->)  #+HCV Ab- HCV RNA PCR negative; false positive Ab or pt may have cleared infection    Recommendations:  - Hep E IgM  - f/u bl cx x2 (9/2), please order CXR; if pt develops diarrhea please send GI PCR and C diff  - rifaxamin 550 mg BID, lactulose 20 mg q6h; titrate to goal 3-4 BM/day  - midodrine 10 mg TID  - ursodiol 500 mg BID  - c/w NAC (8/30-9/4)  Initiate N-acetylcysteine as a continuous infusion in the following steps  1) 150mg/kg in 250 mL 5% dextrose over 30 mins  2) 50mg/kg in 500 mL 5% dextrose solution over 4 hours  3) 100mg/kg in 1000 mL 5% dextrose solution over 16 hours  4) 100mg/kg in 1000 mL 5% dextrose solution over days 2-5  - continue albumin 25% 50 mL q6h  - trend INR, platelet and CMP daily       We will continue to follow.    Berenice Rincon MD  GI Fellow, PGY-4  Available via [employer] Teams    NON-URGENT CONSULTS:  Please email [email or web address]  AT NIGHT AND ON WEEKENDS:  Contact on-call GI fellow via answering service ([phone]) from 5pm-8am and on weekends/holidays  MONDAY-FRIDAY 8AM-5PM:  Pager# [phone] ([facility]) or [phone] ([facility])  GI Phone# [phone] ([facility])

## 2021-09-03 NOTE — PROGRESS NOTE ADULT - ATTENDING COMMENTS
# JULIANA/ ATN - Current on CRRT. Etiology of JULIANA secondary to ATN from bilirubin cast nephropathy or hepatorenal syndrome.  No signs of renal recovery yet. Continue CRRT. UF goal net negative 25-50ml/hour. We can consider transitioning to HD on Monday if she does not require further vasopressor.     # Hyponatremia - multifactorial . Secondary to volume overload in the setting of oliguric JULIANA. Serum sodium has increased gradually with CRRT. Latest sodium is appropriate at 134 today.     # anemia - Tsat 75%. Blood transfusion per primary team.     # Metabolic acidosis - secondary to uremia. Continue CRRT.     The rest of the recommendations as per fellow's note.    Gracie Ho MD  Attending Nephrologist  [phone]

## 2021-09-03 NOTE — ADVANCED PRACTICE NURSE CONSULT - ASSESSMENT
The pt was encountered in the MICU- Mrs Maya is on a [employer] Sport support surface and is being assisted with turning and positioning. She is on contact isolation as she is + for adenovirus.  Will recommend Complete Cair boots to off-load the heels.  The pt was seen by nutrition.  As per discussion with the primary nurse, the pt has been receiving Lactulose and experiencing frequent loose stool. upon assessment, the pt was incontinent of a large amount of liquid stool- staff to provide pericare. On the b/l buttocks, gluteal cleft , b/l thigh and groins, the skin was intact but with blanchable erythema. This is consistent with IAD.  a barrier product that will coat the skin from the frequent assault of stool would be recommended ie Advanced CAvilon,

## 2021-09-04 NOTE — PROGRESS NOTE ADULT - SUBJECTIVE AND OBJECTIVE BOX
MICU PROGRESS NOTE    INTERVAL HPI/OVERNIGHT EVENTS:    SUBJECTIVE:     OBJECTIVE:    VITAL SIGNS:  ICU Vital Signs Last 24 Hrs  T(C): 37.5 (04 Sep 2021 08:00), Max: 37.7 (03 Sep 2021 16:00)  T(F): 99.5 (04 Sep 2021 08:00), Max: 99.9 (03 Sep 2021 16:00)  HR: 100 (04 Sep 2021 08:00) (80 - 127)  BP: 97/52 (04 Sep 2021 08:00) (87/48 - 166/90)  BP(mean): 72 (04 Sep 2021 08:00) (63 - 108)  ABP: --  ABP(mean): --  RR: 18 (04 Sep 2021 08:00) (11 - 33)  SpO2: 99% (04 Sep 2021 08:00) (95% - 100%)      VENTS:      I&O:     @ 07:04 @ 07:00  --------------------------------------------------------  IN: 2422.9 mL / OUT: 3277 mL / NET: -854.1 mL     @ 07:  -  04 @ 08:37  --------------------------------------------------------  IN: 127.2 mL / OUT: 0 mL / NET: 127.2 mL        PHYSICAL EXAM:  GENERAL: NAD, conversant  CHEST/LUNG: Clear to auscultation bilaterally; No crackles, rhonchi, wheezing, or rubs  HEART: Regular rate and rhythm; No murmurs, rubs, or gallops  ABDOMEN: Soft, Nontender, Nondistended; Bowel sounds present  EXTREMITIES:  2+ Peripheral Pulses, No clubbing, cyanosis, or edema  SKIN: No rashes or lesions  NERVOUS SYSTEM:  Alert & Oriented, Good concentration      LINES:                                       MEDICATIONS:  MEDICATIONS  (STANDING):  chlorhexidine 4% Liquid 1 Application(s) Topical <User Schedule>  CRRT Treatment    <Continuous>  dexMEDEtomidine Infusion 0.2 MICROgram(s)/kG/Hr (4.05 mL/Hr) IV Continuous <Continuous>  dextrose 40% Gel 15 Gram(s) Oral once  dextrose 50% Injectable 25 Gram(s) IV Push once  dextrose 50% Injectable 25 Gram(s) IV Push once  dextrose 50% Injectable 12.5 Gram(s) IV Push once  folic acid 1 milliGRAM(s) Oral daily  glucagon  Injectable 1 milliGRAM(s) IntraMuscular once  insulin lispro (ADMELOG) corrective regimen sliding scale   SubCutaneous every 6 hours  lactulose Syrup 20 Gram(s) Oral every 24 hours  midodrine. 10 milliGRAM(s) Oral every 8 hours  norepinephrine Infusion 0.05 MICROgram(s)/kG/Min (7.59 mL/Hr) IV Continuous <Continuous>  Phoxillum Filtration BK 4 / 2.5 5000 milliLiter(s) (900 mL/Hr) CRRT <Continuous>  piperacillin/tazobactam IVPB.. 3.375 Gram(s) IV Intermittent every 8 hours  PrismaSOL Filtration BGK 4 / 2.5 5000 milliLiter(s) (700 mL/Hr) CRRT <Continuous>  PrismaSOL Filtration BGK 4 / 2.5 5000 milliLiter(s) (200 mL/Hr) CRRT <Continuous>  QUEtiapine 25 milliGRAM(s) Oral every 12 hours  rifAXIMin 550 milliGRAM(s) Oral two times a day  simethicone 80 milliGRAM(s) Chew two times a day  thiamine 100 milliGRAM(s) Oral daily  ursodiol Tablet 500 milliGRAM(s) Oral every 12 hours    MEDICATIONS  (PRN):  sodium chloride 0.9% lock flush 10 milliLiter(s) IV Push every 1 hour PRN Pre/post blood products, medications, blood draw, and to maintain line patency      ALLERGIES:  Allergies    No Known Allergies    Intolerances        LABS:                        7.5    25.08 )-----------( 35       ( 04 Sep 2021 00:16 )             22.8     09-04    134<L>  |  99  |  22  ----------------------------<  193<H>  4.5   |  19<L>  |  1.25    Ca    9.6      04 Sep 2021 02:12  Phos  3.0     -  Mg     2.6     -    TPro  5.7<L>  /  Alb  3.7  /  TBili  21.3<H>  /  DBili  x   /  AST  84<H>  /  ALT  61<H>  /  AlkPhos  173<H>      PT/INR - ( 03 Sep 2021 00:15 )   PT: 22.4 sec;   INR: 1.93 ratio         PTT - ( 03 Sep 2021 00:15 )  PTT:40.2 sec  Urinalysis Basic - ( 02 Sep 2021 22:57 )    Color: Dark Yellow / Appearance: Turbid / S.027 / pH: x  Gluc: x / Ketone: Small  / Bili: Moderate / Urobili: Negative   Blood: x / Protein: 30 mg/dL / Nitrite: Negative   Leuk Esterase: Small / RBC: 11 /hpf / WBC 2 /HPF   Sq Epi: x / Non Sq Epi: 1 /hpf / Bacteria: Negative        CAPILLARY BLOOD GLUCOSE      POCT Blood Glucose.: 199 mg/dL (04 Sep 2021 05:46)      RADIOLOGY & ADDITIONAL TESTS: Reviewed. MICU PROGRESS NOTE    INTERVAL HPI/OVERNIGHT EVENTS:  O/n with BMs x3. Off of levophed.    SUBJECTIVE:   Unable to obtain.    OBJECTIVE:  States"Yeah" in response to questions.    VITAL SIGNS:  ICU Vital Signs Last 24 Hrs  T(C): 37.5 (04 Sep 2021 08:00), Max: 37.7 (03 Sep 2021 16:00)  T(F): 99.5 (04 Sep 2021 08:00), Max: 99.9 (03 Sep 2021 16:00)  HR: 100 (04 Sep 2021 08:00) (80 - 127)  BP: 97/52 (04 Sep 2021 08:00) (87/48 - 166/90)  BP(mean): 72 (04 Sep 2021 08:00) (63 - 108)  ABP: --  ABP(mean): --  RR: 18 (04 Sep 2021 08:00) (11 - 33)  SpO2: 99% (04 Sep 2021 08:00) (95% - 100%)      VENTS:      I&O:     @ 07: @ 07:00  --------------------------------------------------------  IN: 2422.9 mL / OUT: 3277 mL / NET: -854.1 mL     @ 07:01  -  04 @ 08:37  --------------------------------------------------------  IN: 127.2 mL / OUT: 0 mL / NET: 127.2 mL      PHYSICAL EXAM:  GENERAL: sleeping, lethargic, Jaundice  HEENT: scleral icterus  CHEST/LUNG: Clear to auscultation bilaterally; No crackles, rhonchi, wheezing, or rubs  HEART: Regular rate and rhythm; No murmurs, rubs, or gallops  ABDOMEN: Soft, Nontender, +distended; Bowel sounds present  EXTREMITIES:  no edema  SKIN: No rashes or lesions  NERVOUS SYSTEM: aaox0 lethargic, states "yes/no" to questions  LINES:        PANTERA Barone central line                                                          MEDICATIONS:  MEDICATIONS  (STANDING):  chlorhexidine 4% Liquid 1 Application(s) Topical <User Schedule>  CRRT Treatment    <Continuous>  dexMEDEtomidine Infusion 0.2 MICROgram(s)/kG/Hr (4.05 mL/Hr) IV Continuous <Continuous>  dextrose 40% Gel 15 Gram(s) Oral once  dextrose 50% Injectable 25 Gram(s) IV Push once  dextrose 50% Injectable 25 Gram(s) IV Push once  dextrose 50% Injectable 12.5 Gram(s) IV Push once  folic acid 1 milliGRAM(s) Oral daily  glucagon  Injectable 1 milliGRAM(s) IntraMuscular once  insulin lispro (ADMELOG) corrective regimen sliding scale   SubCutaneous every 6 hours  lactulose Syrup 20 Gram(s) Oral every 24 hours  midodrine. 10 milliGRAM(s) Oral every 8 hours  norepinephrine Infusion 0.05 MICROgram(s)/kG/Min (7.59 mL/Hr) IV Continuous <Continuous>  Phoxillum Filtration BK 4 / 2.5 5000 milliLiter(s) (900 mL/Hr) CRRT <Continuous>  piperacillin/tazobactam IVPB.. 3.375 Gram(s) IV Intermittent every 8 hours  PrismaSOL Filtration BGK 4 / 2.5 5000 milliLiter(s) (700 mL/Hr) CRRT <Continuous>  PrismaSOL Filtration BGK 4 / 2.5 5000 milliLiter(s) (200 mL/Hr) CRRT <Continuous>  QUEtiapine 25 milliGRAM(s) Oral every 12 hours  rifAXIMin 550 milliGRAM(s) Oral two times a day  simethicone 80 milliGRAM(s) Chew two times a day  thiamine 100 milliGRAM(s) Oral daily  ursodiol Tablet 500 milliGRAM(s) Oral every 12 hours    MEDICATIONS  (PRN):  sodium chloride 0.9% lock flush 10 milliLiter(s) IV Push every 1 hour PRN Pre/post blood products, medications, blood draw, and to maintain line patency      ALLERGIES:  Allergies    No Known Allergies    Intolerances        LABS:                        7.5    25.08 )-----------( 35       ( 04 Sep 2021 00:16 )             22.8     09-04    134<L>  |  99  |  22  ----------------------------<  193<H>  4.5   |  19<L>  |  1.25    Ca    9.6      04 Sep 2021 02:12  Phos  3.0       Mg     2.6         TPro  5.7<L>  /  Alb  3.7  /  TBili  21.3<H>  /  DBili  x   /  AST  84<H>  /  ALT  61<H>  /  AlkPhos  173<H>      PT/INR - ( 03 Sep 2021 00:15 )   PT: 22.4 sec;   INR: 1.93 ratio         PTT - ( 03 Sep 2021 00:15 )  PTT:40.2 sec  Urinalysis Basic - ( 02 Sep 2021 22:57 )    Color: Dark Yellow / Appearance: Turbid / S.027 / pH: x  Gluc: x / Ketone: Small  / Bili: Moderate / Urobili: Negative   Blood: x / Protein: 30 mg/dL / Nitrite: Negative   Leuk Esterase: Small / RBC: 11 /hpf / WBC 2 /HPF   Sq Epi: x / Non Sq Epi: 1 /hpf / Bacteria: Negative        CAPILLARY BLOOD GLUCOSE      POCT Blood Glucose.: 199 mg/dL (04 Sep 2021 05:46)      RADIOLOGY & ADDITIONAL TESTS: Reviewed. MICU PROGRESS NOTE    INTERVAL HPI/OVERNIGHT EVENTS:  O/n with BMs x3. Off of levophed.    SUBJECTIVE:   Unable to obtain.    OBJECTIVE:  States"Yeah" in response to questions.    VITAL SIGNS:  ICU Vital Signs Last 24 Hrs  T(C): 37.5 (04 Sep 2021 08:00), Max: 37.7 (03 Sep 2021 16:00)  T(F): 99.5 (04 Sep 2021 08:00), Max: 99.9 (03 Sep 2021 16:00)  HR: 100 (04 Sep 2021 08:00) (80 - 127)  BP: 97/52 (04 Sep 2021 08:00) (87/48 - 166/90)  BP(mean): 72 (04 Sep 2021 08:00) (63 - 108)  ABP: --  ABP(mean): --  RR: 18 (04 Sep 2021 08:00) (11 - 33)  SpO2: 99% (04 Sep 2021 08:00) (95% - 100%)      VENTS:      I&O:     @ 07:04 @ 07:00  --------------------------------------------------------  IN: 2422.9 mL / OUT: 3277 mL / NET: -854.1 mL     @ 07:01  -  04 @ 08:37  --------------------------------------------------------  IN: 127.2 mL / OUT: 0 mL / NET: 127.2 mL      PHYSICAL EXAM:  GENERAL: obtunded, Jaundice  HEENT: scleral icterus  CHEST/LUNG: Clear to auscultation bilaterally; No crackles, rhonchi, wheezing, or rubs  HEART: Regular rate and rhythm; No murmurs, rubs, or gallops  ABDOMEN: Soft, +diffusely tender to palpation, +distended; Bowel sounds present  EXTREMITIES:  no edema  SKIN: No rashes or lesions  NERVOUS SYSTEM: aaox0 lethargic, states "yes/no" to questions  LINES:        PANTERA Barone central line                                                          MEDICATIONS:  MEDICATIONS  (STANDING):  chlorhexidine 4% Liquid 1 Application(s) Topical <User Schedule>  CRRT Treatment    <Continuous>  dexMEDEtomidine Infusion 0.2 MICROgram(s)/kG/Hr (4.05 mL/Hr) IV Continuous <Continuous>  dextrose 40% Gel 15 Gram(s) Oral once  dextrose 50% Injectable 25 Gram(s) IV Push once  dextrose 50% Injectable 25 Gram(s) IV Push once  dextrose 50% Injectable 12.5 Gram(s) IV Push once  folic acid 1 milliGRAM(s) Oral daily  glucagon  Injectable 1 milliGRAM(s) IntraMuscular once  insulin lispro (ADMELOG) corrective regimen sliding scale   SubCutaneous every 6 hours  lactulose Syrup 20 Gram(s) Oral every 24 hours  midodrine. 10 milliGRAM(s) Oral every 8 hours  norepinephrine Infusion 0.05 MICROgram(s)/kG/Min (7.59 mL/Hr) IV Continuous <Continuous>  Phoxillum Filtration BK 4 / 2.5 5000 milliLiter(s) (900 mL/Hr) CRRT <Continuous>  piperacillin/tazobactam IVPB.. 3.375 Gram(s) IV Intermittent every 8 hours  PrismaSOL Filtration BGK 4 / 2.5 5000 milliLiter(s) (700 mL/Hr) CRRT <Continuous>  PrismaSOL Filtration BGK 4 / 2.5 5000 milliLiter(s) (200 mL/Hr) CRRT <Continuous>  QUEtiapine 25 milliGRAM(s) Oral every 12 hours  rifAXIMin 550 milliGRAM(s) Oral two times a day  simethicone 80 milliGRAM(s) Chew two times a day  thiamine 100 milliGRAM(s) Oral daily  ursodiol Tablet 500 milliGRAM(s) Oral every 12 hours    MEDICATIONS  (PRN):  sodium chloride 0.9% lock flush 10 milliLiter(s) IV Push every 1 hour PRN Pre/post blood products, medications, blood draw, and to maintain line patency      ALLERGIES:  Allergies    No Known Allergies    Intolerances        LABS:                        7.5    25.08 )-----------( 35       ( 04 Sep 2021 00:16 )             22.8     09-04    134<L>  |  99  |  22  ----------------------------<  193<H>  4.5   |  19<L>  |  1.25    Ca    9.6      04 Sep 2021 02:12  Phos  3.0       Mg     2.6         TPro  5.7<L>  /  Alb  3.7  /  TBili  21.3<H>  /  DBili  x   /  AST  84<H>  /  ALT  61<H>  /  AlkPhos  173<H>      PT/INR - ( 03 Sep 2021 00:15 )   PT: 22.4 sec;   INR: 1.93 ratio         PTT - ( 03 Sep 2021 00:15 )  PTT:40.2 sec  Urinalysis Basic - ( 02 Sep 2021 22:57 )    Color: Dark Yellow / Appearance: Turbid / S.027 / pH: x  Gluc: x / Ketone: Small  / Bili: Moderate / Urobili: Negative   Blood: x / Protein: 30 mg/dL / Nitrite: Negative   Leuk Esterase: Small / RBC: 11 /hpf / WBC 2 /HPF   Sq Epi: x / Non Sq Epi: 1 /hpf / Bacteria: Negative        CAPILLARY BLOOD GLUCOSE      POCT Blood Glucose.: 199 mg/dL (04 Sep 2021 05:46)      RADIOLOGY & ADDITIONAL TESTS: Reviewed.

## 2021-09-04 NOTE — PROGRESS NOTE ADULT - SUBJECTIVE AND OBJECTIVE BOX
[facility] Division of Kidney Diseases & Hypertension  FOLLOW UP NOTE  [phone]--------------------------------------------------------------------------------  Chief Complaint:Hepatic cirrhosis      HPI: 56-year-old Female with PMhx ETOH use disorder (last drink ~2 weeks ago), ETOH cirrhosis with ascites, splenomegaly, HLD, IBS, anxiety presented from [facility] to [facility] MICU on 8/29/21 for severe hyponatremia and JULIANA requiring CRRT. Nephrology consultation requested for JULIANA and hyponatremia. Pt. currently on CRRT since 8/29/21.      24 hour events/subjective: Pt. seen and examined at bedside.  No overnight issues reported issues reported. Off of levophed. Remains anuric          PAST HISTORY  --------------------------------------------------------------------------------  No significant changes to PMH, PSH, FHx, SHx, unless otherwise noted    ALLERGIES & MEDICATIONS  --------------------------------------------------------------------------------  Allergies    No Known Allergies    Intolerances      Standing Inpatient Medications  chlorhexidine 4% Liquid 1 Application(s) Topical <User Schedule>  CRRT Treatment    <Continuous>  dexMEDEtomidine Infusion 0.2 MICROgram(s)/kG/Hr IV Continuous <Continuous>  dextrose 40% Gel 15 Gram(s) Oral once  dextrose 50% Injectable 25 Gram(s) IV Push once  dextrose 50% Injectable 12.5 Gram(s) IV Push once  dextrose 50% Injectable 25 Gram(s) IV Push once  folic acid 1 milliGRAM(s) Oral daily  glucagon  Injectable 1 milliGRAM(s) IntraMuscular once  insulin lispro (ADMELOG) corrective regimen sliding scale   SubCutaneous every 6 hours  lactulose Syrup 20 Gram(s) Oral every 24 hours  midodrine. 10 milliGRAM(s) Oral every 8 hours  norepinephrine Infusion 0.05 MICROgram(s)/kG/Min IV Continuous <Continuous>  Phoxillum Filtration BK 4 / 2.5 5000 milliLiter(s) CRRT <Continuous>  piperacillin/tazobactam IVPB.. 3.375 Gram(s) IV Intermittent every 8 hours  PrismaSOL Filtration BGK 4 / 2.5 5000 milliLiter(s) CRRT <Continuous>  PrismaSOL Filtration BGK 4 / 2.5 5000 milliLiter(s) CRRT <Continuous>  QUEtiapine 25 milliGRAM(s) Oral every 12 hours  rifAXIMin 550 milliGRAM(s) Oral two times a day  simethicone 80 milliGRAM(s) Chew two times a day  thiamine 100 milliGRAM(s) Oral daily  ursodiol Tablet 500 milliGRAM(s) Oral every 12 hours    PRN Inpatient Medications  sodium chloride 0.9% lock flush 10 milliLiter(s) IV Push every 1 hour PRN      REVIEW OF SYSTEMS  --------------------------------------------------------------------------------  unable to obtain  '    VITALS/PHYSICAL EXAM  --------------------------------------------------------------------------------  T(C): 37.5 (09-04-21 @ 08:00), Max: 37.7 (09-03-21 @ 16:00)  HR: 114 (09-04-21 @ 09:00) (80 - 127)  BP: 115/57 (09-04-21 @ 09:00) (87/48 - 166/90)  RR: 19 (09-04-21 @ 09:00) (11 - 33)  SpO2: 100% (09-04-21 @ 09:00) (95% - 100%)  Wt(kg): --        09-03-21 @ 07:01  -  09-04-21 @ 07:00  --------------------------------------------------------  IN: 2422.9 mL / OUT: 3277 mL / NET: -854.1 mL    09-04-21 @ 07:01  -  09-04-21 @ 09:12  --------------------------------------------------------  IN: 127.2 mL / OUT: 0 mL / NET: 127.2 mL      Physical Exam:  	Gen: NAD, appears somnolent  	HEENT: MMM, icteric  	Pulm: CTA B/L  	CV: S1S2  	Abd: Soft, +BS   	Ext: No LE edema B/L  	Neuro: Awake, moving extremities voluntarily  	Skin: Warm and dry  	Vascular access: right  internal jugular non tunnled cath    LABS/STUDIES  --------------------------------------------------------------------------------              7.5    25.08 >-----------<  35       [09-04-21 @ 00:16]              22.8     134  |  99  |  22  ----------------------------<  193      [09-04-21 @ 02:12]  4.5   |  19  |  1.25        Ca     9.6     [09-04-21 @ 02:12]      Mg     2.6     [09-04-21 @ 02:12]      Phos  3.0     [09-04-21 @ 02:12]    TPro  5.7  /  Alb  3.7  /  TBili  21.3  /  DBili  x   /  AST  84  /  ALT  61  /  AlkPhos  173  [09-03-21 @ 03:05]    PT/INR: PT 22.4 , INR 1.93       [09-03-21 @ 00:15]  PTT: 40.2       [09-03-21 @ 00:15]          [09-02-21 @ 14:37]    Creatinine Trend:  SCr 1.25 [09-04 @ 02:12]  SCr 1.22 [09-03 @ 20:20]  SCr 1.04 [09-03 @ 14:51]  SCr 1.26 [09-03 @ 09:04]  SCr 1.18 [09-03 @ 03:05]    Urinalysis - [09-02-21 @ 22:57]      Color Dark Yellow / Appearance Turbid / SG 1.027 / pH 5.5      Gluc Trace / Ketone Small  / Bili Moderate / Urobili Negative       Blood Large / Protein 30 mg/dL / Leuk Est Small / Nitrite Negative      RBC 11 / WBC 2 / Hyaline 3 / Gran  / Sq Epi  / Non Sq Epi 1 / Bacteria Negative    Urine Sodium 20      [08-30-21 @ 00:22]  Urine Potassium 23      [08-30-21 @ 00:22]  Urine Osmolality 340      [08-30-21 @ 00:22]    Iron 119, TIBC 158, %sat 75      [08-29-21 @ 23:03]  Ferritin 1032      [09-01-21 @ 10:57]  TSH 1.38      [08-29-21 @ 23:03]    HBsAg Nonreact      [08-29-21 @ 23:26]  HCV 1.08, Weakly Reactive Hepatitis C AB  S/CO Ratio                        Interpretation  < 1.00                                   Non-Reactive  1.00 - 4.99                         Weakly-Reactive  >= 5.00                                Reactive  Non-Reactive: A person with a non-reactive HCV antibody result is  considered uninfected.  No further action is needed unless recent  infection is suspected.  In these cases, consider repeat testing later to  detect seroconversion..  Weakly-Reactive: HCV antibody test is abnormal, HCV RNA Qualitative test  will follow.  Reactive: HCV antibody test is abnormal, HCV RNA Qualitative test will  follow.  Note: HCV antibody testing is performed on the [employer]  system.      [08-29-21 @ 23:26]    FARHANA: titer Negative, pattern --      [09-01-21 @ 10:48]

## 2021-09-04 NOTE — PROGRESS NOTE ADULT - ATTENDING COMMENTS
Agree with above except as noted. 56F EtOH abuse, cirrhosis/portal HTN, IBS, and HLD who is transferred from [facility] for liver and renal dysfunction. She was admitted to MICU for concern of hepatorenal syndrome and initiation of CRRT.      Shock state maintain MAP > 65   - pt with intermittent Levophed requirement, cont Midodrine.   - Continue Ceftriaxone for E. coli UTI  -JULIANA w/ likely hepatorenal now on CRRT w/ plan for HD monday  - Acute Liver failure, in the setting of known EtOh use, s/p NAC x 5 days   -US with possible portal vein clots - followup official CT abdomen/pelvis with patent portal vessesl - no AC  -cont Lactulose and Rifaximin    - wbc increased on abx w/o clear source-skin intact, no tappable ascites, no resp sx      Prognosis guarded given severe liver and kidney failure Agree with above except as noted. 56F EtOH abuse, cirrhosis/portal HTN, IBS, and HLD who is transferred from [facility] for liver and renal dysfunction. She was admitted to MICU for concern of hepatorenal syndrome and initiation of CRRT.      Shock state maintain MAP > 65   - pt with intermittent Levophed requirement, cont Midodrine.   - Continue Ceftriaxone for E. coli UTI and bacteremia  -JULIANA w/ likely hepatorenal now on CRRT w/ plan for HD monday  - Acute Liver failure, in the setting of known EtOh use, s/p NAC x 5 days   -US with possible portal vein clots - followup official CT abdomen/pelvis with patent portal vessesl - no AC  -cont Lactulose and Rifaximin    - wbc increased on abx w/o clear source-skin intact, no tappable ascites, no resp sx      Prognosis guarded given severe liver and kidney failure

## 2021-09-04 NOTE — PROGRESS NOTE ADULT - ASSESSMENT
56 y.o. F with PMH of ETOH misuse, ETOH cirrhosis, HLD, IBS, anxiety, presents with decompensated alcoholic cirrhosis c/b HRS, likely contributing to acute renal failure and hyponatremia s/p now on CRRT, transferred to [facility] MICU, course c/b hepatic encephalopathy      Neuro  aaox0, lethargic, but awakens (but enceophalopathic) when off precedex    #Delirium  -seroquel 25 q12  -c/w precedex, has been needed to prevent pt from pulling at lines; wean     #Hepatic encephalopathy  - lactulose, rifaximin  BID  -titrate to 4-5BM daily  -stool count    # ETOH Misuse  - Continue to monitor for w/d symptoms; pt reported last drink was 2.5 wks ago  - SW consult & cessation counseling   - c/w thiamine and folate     # Anxiety  - C/w Xanax PRN   - See ISTOP   -c/w precedex, has been needed to prevent pt from pulling at lines    Cardiovascular  #Hypotension -on levophed  - MAP goal to 65, put unlikely to have renal recovery  - midodrine 10 q8  -off of levophed    Pulmonary  - Normal O2 Saturation on room air, monitor for volume overload in s/o ARF and hypervolemia   -monitor ability to protect airway    GI  # Hepatorenal syndrome   - See Renal     # ETOH Cirrhosis   - s/p solumedrol 40mg IV qD  -- discontinued as per hepatology  - Maddrey score: 52.4 indicative of poor prognosis  - MELD Score: 38 --?65-66% risk of 90 day mortality   - small ascites on U/S abd, no pockets available to tap    - Trend liver labs daily CMP   - s/p NAC - started 8/30 - 9/3 for 5 days    Endoscopy in 2019: no varices, stomach with congestion c/w gastritis vs portal gastropathy    Negative Hep B surface Ag, Hep B core Ab, Hep A IgM  Weakly reactive Hep C Ab  [ x ]Hep E IgM sent  [x ]check serum FARHANA, smooth muscle Ab, anti-LKM-1 Ab, alpha-1 antitrypsin, ferritin, ceruloplasmin, copper, immunoglobulin panel (IgG, IgA, IgM)    # Hep C weakly reactive, but Hep C RNA viral load negative    #R/o portal vein thrombosis  triple phase CT A/P to r/o portal vein thrombosis --- negative --> Hep gtt discontinued      #abd distension      Urinary retention  s/p metzger DC 8/30  bladder scan q8h  s/p straight cath x1 9/1 am with 100cc        Renal  # Hepatorenal syndrome, unlikely to have renal recovery as pt remains anuric  - Suspect acute renal failure 2/2 HRS in s/o alcoholic hepatitis and cirrhosis   - s/p octreatride gtt  -s/p albumin   - On CRRT, continue through weekend, plan for iHD on Monday 9/6  - Baseline Cr 2.5  - Monitor Cr and avoid nephrotoxic agents  - Renally dose meds   - MAP goal 65  - Monitor I/Os   [ ] bmp q6h while on CRRT    # Hyponatremia - 114 on presentation, now resolved.  - Euvolemic on exam; possibly 2/2 ARF and or poor oral intake in s/o ETOH use   - Angela 20, suggestive of decreased PO intake    ID  CXR clear lungs  Leukocytosis rising while on ceftriaxone, unclear cause.    #E.coli bacteremia likely 2/2 E.coli UTI  -CTX 8/30 -xx -- plan for 7d  ->100k E.coli CFU  sensitive to CTX    # SBP PPx   - Minimal ascites seen on POCUS 9/1 -- nothing to tap  -Discussed with GI attending, no need to consult IR right now as nothing to tap  -On CTX for UTI    Heme  # Macrocytic anemia & Thrombocytopenia   - Likely in s/o poor nutritional status from ETOH use  - B12  normal, folate iron studies  - Folate thiamine supplement    # Coagulopathy   - In s/o decompensated alcoholic cirrhosis and ARF   - Give DDAVP for likely plt dysfunction   -[ ] Trend daily PT INR PTT   - INR worsening overall    Ethics  # FULL code   -  is surrogate decision maker    Michele updated 9/1 56 y.o. F with PMH of ETOH misuse, ETOH cirrhosis, HLD, IBS, anxiety, presents with decompensated alcoholic cirrhosis c/b HRS, likely contributing to acute renal failure and hyponatremia s/p now on CRRT, transferred to [facility] MICU, course c/b hepatic encephalopathy.      Neuro  aaox0, lethargic, but awakens (but enceophalopathic) when off precedex    #Delirium  -seroquel 25 q12  -c/w precedex, has been needed to prevent pt from pulling at lines; wean     #Hepatic encephalopathy  - lactulose, rifaximin  BID  -titrate to 4-5BM daily  -stool count    # ETOH Misuse  - Continue to monitor for w/d symptoms; pt reported last drink was 2.5 wks ago  - SW consult & cessation counseling   - c/w thiamine and folate     # Anxiety  - C/w Xanax PRN   - See ISTOP   -c/w precedex, has been needed to prevent pt from pulling at lines    Cardiovascular  #Hypotension -on levophed  - MAP goal to 65, put unlikely to have renal recovery  - midodrine 10 q8 --> 9/4 increase to 20q8h  -on and off of levophed    Pulmonary  - Normal O2 Saturation on room air, monitor for volume overload in s/o ARF and hypervolemia   -monitor ability to protect airway    GI  # Hepatorenal syndrome   - See Renal     # ETOH Cirrhosis   - s/p solumedrol 40mg IV qD  -- discontinued as per hepatology  - Maddrey score: 52.4 indicative of poor prognosis  - MELD Score: 38 --?65-66% risk of 90 day mortality   - small ascites on U/S abd, no pockets available to tap    - Trend liver labs daily CMP   - s/p NAC - started 8/30 - 9/3 for 5 days    Endoscopy in 2019: no varices, stomach with congestion c/w gastritis vs portal gastropathy    Negative Hep B surface Ag, Hep B core Ab, Hep A IgM  Weakly reactive Hep C Ab  [ x ]Hep E IgM sent  [x ]check serum FARHANA, smooth muscle Ab, anti-LKM-1 Ab, alpha-1 antitrypsin, ferritin, ceruloplasmin, copper, immunoglobulin panel (IgG, IgA, IgM)    # Hep C weakly reactive, but Hep C RNA viral load negative    #R/o portal vein thrombosis  triple phase CT A/P to r/o portal vein thrombosis --- negative --> Hep gtt discontinued    #abd distension  +BS, soft, sl tender to palpation  still with BMs  Abd XR  If leukocytosis continues to worsen or pressor requirements go up will consider CT A/P        Anuric  See ID section    Renal  # Hepatorenal syndrome, unlikely to have renal recovery as pt remains anuric  - Suspect acute renal failure 2/2 HRS in s/o alcoholic hepatitis and cirrhosis   - s/p octreatride gtt  -s/p albumin   - On CRRT, continue through weekend, plan for iHD on Monday 9/6  - Baseline Cr 2.5  - Monitor Cr and avoid nephrotoxic agents  - Renally dose meds   - MAP goal 65  - Monitor I/Os   [ ] bmp q6h while on CRRT    # Hyponatremia - 114 on presentation, now resolved.  - Euvolemic on exam; possibly 2/2 ARF and or poor oral intake in s/o ETOH use   - Angela 20, suggestive of decreased PO intake    ID  CXR clear lungs  Leukocytosis rising while on ceftriaxone, unclear cause.    #E.coli bacteremia likely 2/2 E.coli UTI  -CTX 8/30 -9/2  -zosyn 9/2-9/4  -CTX 9/5  E. coli in UTI -->100k E.coli CFU, sensitive to CTX  E. coli  in blood culture  sensitive to CTX    # SBP PPx   - Minimal ascites seen on POCUS 9/1 -- nothing to tap  -Discussed with GI attending, no need to consult IR right now as nothing to tap  -On CTX for UTI    Heme  # Macrocytic anemia & Thrombocytopenia   - Likely in s/o poor nutritional status from ETOH use  - B12  normal, folate iron studies  - Folate thiamine supplement    # Coagulopathy   - In s/o decompensated alcoholic cirrhosis and ARF   - Give DDAVP for likely plt dysfunction   -[ ] Trend daily PT INR PTT   - INR worsening overall    Ethics  # FULL code   -  is surrogate decision maker    Michele updated 9/2

## 2021-09-05 NOTE — PROGRESS NOTE ADULT - SUBJECTIVE AND OBJECTIVE BOX
Los Wu, PGY-2      INTERVAL HPI/OVERNIGHT EVENTS: Patient required Levo .3. CRRT clotted, Hb was 6.9 and required 1 unit OV. Patient febrile OV and cultured     SUBJECTIVE: Patient seen and examined at bedside. Unable to assess ROS due to patient mental status      VITAL SIGNS:  ICU Vital Signs Last 24 Hrs  T(C): 37.4 (05 Sep 2021 08:00), Max: 38 (05 Sep 2021 04:00)  T(F): 99.3 (05 Sep 2021 08:00), Max: 100.4 (05 Sep 2021 04:00)  HR: 119 (05 Sep 2021 08:15) (93 - 126)  BP: 118/58 (05 Sep 2021 08:15) (63/31 - 132/59)  BP(mean): 84 (05 Sep 2021 08:15) (42 - 87)  ABP: --  ABP(mean): --  RR: 16 (05 Sep 2021 08:15) (11 - 30)  SpO2: 100% (05 Sep 2021 08:15) (97% - 100%)      Plateau pressure:   P/F ratio:     09-04 @ 07:01  -  09-05 @ 07:00  --------------------------------------------------------  IN: 1520.1 mL / OUT: 2688 mL / NET: -1167.9 mL    09-05 @ 07:01  -  09-05 @ 08:31  --------------------------------------------------------  IN: 473.1 mL / OUT: 0 mL / NET: 473.1 mL      CAPILLARY BLOOD GLUCOSE      POCT Blood Glucose.: 217 mg/dL (05 Sep 2021 05:31)    ECG:    PHYSICAL EXAM:    GENERAL: Lethargic, Jaundiced  HEENT: scleral icterus  CHEST/LUNG: Clear to auscultation bilaterally; No crackles, rhonchi, wheezing, or rubs  HEART: Regular rate and rhythm; No murmurs, rubs, or gallops  ABDOMEN: Soft, +diffusely tender to palpation, +distended; Bowel sounds present  EXTREMITIES:  no edema  SKIN: No rashes or lesions  NERVOUS SYSTEM: aaox0 lethargic, states "yes/no" to questions  LINES:        PANTERA Barone central line                               MEDICATIONS:  MEDICATIONS  (STANDING):  cefTRIAXone   IVPB 1000 milliGRAM(s) IV Intermittent every 24 hours  chlorhexidine 4% Liquid 1 Application(s) Topical <User Schedule>  CRRT Treatment    <Continuous>  dexMEDEtomidine Infusion 0.2 MICROgram(s)/kG/Hr (4.05 mL/Hr) IV Continuous <Continuous>  dextrose 40% Gel 15 Gram(s) Oral once  dextrose 50% Injectable 25 Gram(s) IV Push once  dextrose 50% Injectable 12.5 Gram(s) IV Push once  dextrose 50% Injectable 25 Gram(s) IV Push once  folic acid 1 milliGRAM(s) Oral daily  glucagon  Injectable 1 milliGRAM(s) IntraMuscular once  insulin lispro (ADMELOG) corrective regimen sliding scale   SubCutaneous every 6 hours  lactulose Syrup 20 Gram(s) Oral every 24 hours  midodrine. 20 milliGRAM(s) Oral every 8 hours  norepinephrine Infusion 0.05 MICROgram(s)/kG/Min (7.59 mL/Hr) IV Continuous <Continuous>  Phoxillum Filtration BK 4 / 2.5 5000 milliLiter(s) (900 mL/Hr) CRRT <Continuous>  PrismaSOL Filtration BGK 0 / 2.5 5000 milliLiter(s) (700 mL/Hr) CRRT <Continuous>  PrismaSOL Filtration BGK 0 / 2.5 5000 milliLiter(s) (200 mL/Hr) CRRT <Continuous>  QUEtiapine 25 milliGRAM(s) Oral every 12 hours  rifAXIMin 550 milliGRAM(s) Oral two times a day  simethicone 80 milliGRAM(s) Chew two times a day  thiamine 100 milliGRAM(s) Oral daily  ursodiol Tablet 500 milliGRAM(s) Oral every 12 hours    MEDICATIONS  (PRN):  sodium chloride 0.9% lock flush 10 milliLiter(s) IV Push every 1 hour PRN Pre/post blood products, medications, blood draw, and to maintain line patency      ALLERGIES:  Allergies    No Known Allergies    Intolerances        LABS:                        6.9    19.64 )-----------( 71       ( 05 Sep 2021 05:37 )             21.4     09-05    136  |  102  |  40<H>  ----------------------------<  213<H>  4.9   |  15<L>  |  1.49<H>    Ca    9.4      05 Sep 2021 05:37  Phos  3.2     09-05  Mg     2.9     09-05    TPro  5.9<L>  /  Alb  3.6  /  TBili  25.8<H>  /  DBili  x   /  AST  95<H>  /  ALT  65<H>  /  AlkPhos  209<H>  09-05    PT/INR - ( 05 Sep 2021 00:13 )   PT: 16.8 sec;   INR: 1.42 ratio         PTT - ( 05 Sep 2021 00:13 )  PTT:33.5 sec      RADIOLOGY & ADDITIONAL TESTS: Reviewed.

## 2021-09-05 NOTE — PROGRESS NOTE ADULT - SUBJECTIVE AND OBJECTIVE BOX
[facility] Division of Kidney Diseases & Hypertension  FOLLOW UP NOTE  [phone]--------------------------------------------------------------------------------  Chief Complaint:Hepatic cirrhosis        HPI: 56-year-old Female with PMhx ETOH use disorder (last drink ~2 weeks ago), ETOH cirrhosis with ascites, splenomegaly, HLD, IBS, anxiety presented from [facility] [place] to [facility] MICU on 8/29/21 for severe hyponatremia and JULIANA requiring CRRT. Nephrology consultation requested for JULIANA and hyponatremia. Pt. currently on CRRT since 8/29/21.      24 hour events/subjective: Pt. seen and examined at bedside.  CRRT filter clotted overnight.  Hb was 6.9 and required 1 unit. Patient febrile overnight.    Currently UF 50cc/hr.  Remains anuric        PAST HISTORY  --------------------------------------------------------------------------------  No significant changes to PMH, PSH, FHx, SHx, unless otherwise noted    ALLERGIES & MEDICATIONS  --------------------------------------------------------------------------------  Allergies    No Known Allergies    Intolerances      Standing Inpatient Medications  cefTRIAXone   IVPB 1000 milliGRAM(s) IV Intermittent every 24 hours  chlorhexidine 4% Liquid 1 Application(s) Topical <User Schedule>  CRRT Treatment    <Continuous>  dexMEDEtomidine Infusion 0.2 MICROgram(s)/kG/Hr IV Continuous <Continuous>  dextrose 40% Gel 15 Gram(s) Oral once  dextrose 50% Injectable 25 Gram(s) IV Push once  dextrose 50% Injectable 12.5 Gram(s) IV Push once  dextrose 50% Injectable 25 Gram(s) IV Push once  folic acid 1 milliGRAM(s) Oral daily  glucagon  Injectable 1 milliGRAM(s) IntraMuscular once  insulin lispro (ADMELOG) corrective regimen sliding scale   SubCutaneous every 6 hours  lactulose Syrup 20 Gram(s) Oral every 24 hours  midodrine. 20 milliGRAM(s) Oral every 8 hours  norepinephrine Infusion 0.05 MICROgram(s)/kG/Min IV Continuous <Continuous>  Phoxillum Filtration BK 4 / 2.5 5000 milliLiter(s) CRRT <Continuous>  PrismaSOL Filtration BGK 0 / 2.5 5000 milliLiter(s) CRRT <Continuous>  PrismaSOL Filtration BGK 0 / 2.5 5000 milliLiter(s) CRRT <Continuous>  QUEtiapine 25 milliGRAM(s) Oral every 12 hours  rifAXIMin 550 milliGRAM(s) Oral two times a day  simethicone 80 milliGRAM(s) Chew two times a day  thiamine 100 milliGRAM(s) Oral daily  ursodiol Tablet 500 milliGRAM(s) Oral every 12 hours    PRN Inpatient Medications  sodium chloride 0.9% lock flush 10 milliLiter(s) IV Push every 1 hour PRN      REVIEW OF SYSTEMS  --------------------------------------------------------------------------------  unable to obtain      VITALS/PHYSICAL EXAM  --------------------------------------------------------------------------------  T(C): 37.4 (09-05-21 @ 08:00), Max: 38 (09-05-21 @ 04:00)  HR: 117 (09-05-21 @ 09:15) (93 - 126)  BP: 111/59 (09-05-21 @ 09:15) (63/31 - 132/59)  RR: 17 (09-05-21 @ 09:15) (11 - 30)  SpO2: 99% (09-05-21 @ 09:15) (97% - 100%)  Wt(kg): --        09-04-21 @ 07:01  -  09-05-21 @ 07:00  --------------------------------------------------------  IN: 1520.1 mL / OUT: 2688 mL / NET: -1167.9 mL    09-05-21 @ 07:01  -  09-05-21 @ 10:01  --------------------------------------------------------  IN: 473.1 mL / OUT: 0 mL / NET: 473.1 mL      Physical Exam:  Gen: NAD, appears somnolent  	HEENT: MMM, icteric  	Pulm: CTA B/L  	CV: S1S2  	Abd: Soft, +BS   	Ext: No LE edema B/L  	Neuro: Awake, moving extremities voluntarily  	Skin: Warm and dry  	Vascular access: right  internal jugular non tunnled cath      	  LABS/STUDIES  --------------------------------------------------------------------------------              6.9    19.64 >-----------<  71       [09-05-21 @ 05:37]              21.4     136  |  102  |  40  ----------------------------<  213      [09-05-21 @ 05:37]  4.9   |  15  |  1.49        Ca     9.4     [09-05-21 @ 05:37]      Mg     2.9     [09-05-21 @ 05:37]      Phos  3.2     [09-05-21 @ 05:37]    TPro  5.9  /  Alb  3.6  /  TBili  25.8  /  DBili  x   /  AST  95  /  ALT  65  /  AlkPhos  209  [09-05-21 @ 00:13]    PT/INR: PT 16.8 , INR 1.42       [09-05-21 @ 00:13]  PTT: 33.5       [09-05-21 @ 00:13]      Creatinine Trend:  SCr 1.49 [09-05 @ 05:37]  SCr 1.37 [09-05 @ 00:13]  SCr 1.07 [09-04 @ 17:33]  SCr 1.03 [09-04 @ 11:34]  SCr 1.25 [09-04 @ 02:12]    Urinalysis - [09-02-21 @ 22:57]      Color Dark Yellow / Appearance Turbid / SG 1.027 / pH 5.5      Gluc Trace / Ketone Small  / Bili Moderate / Urobili Negative       Blood Large / Protein 30 mg/dL / Leuk Est Small / Nitrite Negative      RBC 11 / WBC 2 / Hyaline 3 / Gran  / Sq Epi  / Non Sq Epi 1 / Bacteria Negative    Urine Sodium 20      [08-30-21 @ 00:22]  Urine Potassium 23      [08-30-21 @ 00:22]  Urine Osmolality 340      [08-30-21 @ 00:22]    Iron 119, TIBC 158, %sat 75      [08-29-21 @ 23:03]  Ferritin 1032      [09-01-21 @ 10:57]  TSH 1.38      [08-29-21 @ 23:03]    HBsAg Nonreact      [08-29-21 @ 23:26]  HCV 1.08, Weakly Reactive Hepatitis C AB  S/CO Ratio                        Interpretation  < 1.00                                   Non-Reactive  1.00 - 4.99                         Weakly-Reactive  >= 5.00                                Reactive  Non-Reactive: A person with a non-reactive HCV antibody result is  considered uninfected.  No further action is needed unless recent  infection is suspected.  In these cases, consider repeat testing later to  detect seroconversion..  Weakly-Reactive: HCV antibody test is abnormal, HCV RNA Qualitative test  will follow.  Reactive: HCV antibody test is abnormal, HCV RNA Qualitative test will  follow.  Note: HCV antibody testing is performed on the [employer]  system.      [08-29-21 @ 23:26]    FARHANA: titer Negative, pattern --      [09-01-21 @ 10:48]

## 2021-09-05 NOTE — PROGRESS NOTE ADULT - ASSESSMENT
56 y.o. F with PMH of ETOH misuse, ETOH cirrhosis, HLD, IBS, anxiety, presents with decompensated alcoholic cirrhosis c/b HRS, likely contributing to acute renal failure and hyponatremia s/p now on CRRT, transferred to [facility] MICU, course c/b hepatic encephalopathy.    Neuro  aaox0, lethargic, but awakens (but enceophalopathic) when off precedex    #Delirium  -seroquel 25 q12  -c/w precedex, has been needed to prevent pt from pulling at lines; wean     #Hepatic encephalopathy  - lactulose, rifaximin  BID  -titrate to 4-5BM daily  -stool count    # ETOH Misuse  - Continue to monitor for w/d symptoms; pt reported last drink was 2.5 wks ago  - SW consult & cessation counseling   - c/w thiamine and folate     # Anxiety  - C/w Xanax PRN   - See ISTOP   -c/w precedex, has been needed to prevent pt from pulling at lines    Cardiovascular  #Hypotension -on levophed  - MAP goal to 65, put unlikely to have renal recovery  - midodrine 10 q8 --> 9/4 increase to 20q8h  -on and off of levophed    Pulmonary  - Normal O2 Saturation on room air, monitor for volume overload in s/o ARF and hypervolemia   -monitor ability to protect airway    GI  # Hepatorenal syndrome   - See Renal     # ETOH Cirrhosis   - s/p solumedrol 40mg IV qD  -- discontinued as per hepatology  - Maddrey score: 52.4 indicative of poor prognosis  - MELD Score: 38 --?65-66% risk of 90 day mortality   - small ascites on U/S abd, no pockets available to tap    - Trend liver labs daily CMP   - s/p NAC - started 8/30 - 9/3 for 5 days    Endoscopy in 2019: no varices, stomach with congestion c/w gastritis vs portal gastropathy    Negative Hep B surface Ag, Hep B core Ab, Hep A IgM  Weakly reactive Hep C Ab  [ x ]Hep E IgM sent  [x ]check serum FARHANA, smooth muscle Ab, anti-LKM-1 Ab, alpha-1 antitrypsin, ferritin, ceruloplasmin, copper, immunoglobulin panel (IgG, IgA, IgM)    # Hep C weakly reactive, but Hep C RNA viral load negative    #R/o portal vein thrombosis  triple phase CT A/P to r/o portal vein thrombosis --- negative --> Hep gtt discontinued    #abd distension  +BS, soft, sl tender to palpation  still with BMs  Abd XR  If leukocytosis continues to worsen or pressor requirements go up will consider CT A/P        Anuric  See ID section    Renal  # Hepatorenal syndrome, unlikely to have renal recovery as pt remains anuric  - Suspect acute renal failure 2/2 HRS in s/o alcoholic hepatitis and cirrhosis   - s/p octreatride gtt  -s/p albumin   - On CRRT, continue through weekend, plan for iHD on Monday 9/6  - Baseline Cr 2.5  - Monitor Cr and avoid nephrotoxic agents  - Renally dose meds   - MAP goal 65  - Monitor I/Os   [ ] bmp q6h while on CRRT    # Hyponatremia - 114 on presentation, now resolved.  - Euvolemic on exam; possibly 2/2 ARF and or poor oral intake in s/o ETOH use   - Angela 20, suggestive of decreased PO intake    ID  CXR clear lungs  Leukocytosis rising while on ceftriaxone, unclear cause.    #E.coli bacteremia likely 2/2 E.coli UTI  -CTX 8/30 -9/2  -zosyn 9/2-9/4  -CTX 9/5  E. coli in UTI -->100k E.coli CFU, sensitive to CTX  E. coli  in blood culture  sensitive to CTX  - F/u repeat BCx     # SBP PPx   - Minimal ascites seen on POCUS 9/1 -- nothing to tap  -Discussed with GI attending, no need to consult IR right now as nothing to tap  -On CTX for UTI    Heme  # Macrocytic anemia & Thrombocytopenia   - Likely in s/o poor nutritional status from ETOH use  - B12  normal, folate iron studies  - Folate thiamine supplement    # Coagulopathy   - In s/o decompensated alcoholic cirrhosis and ARF   - Give DDAVP for likely plt dysfunction   -[ ] Trend daily PT INR PTT   - INR worsening overall    Ethics  # FULL code   -  is surrogate decision maker    Michele updated 9/2

## 2021-09-05 NOTE — PROGRESS NOTE ADULT - ATTENDING COMMENTS
Agree with above except as noted. 56F EtOH abuse, cirrhosis/portal HTN, IBS, and HLD who is transferred from [facility] for liver and renal dysfunction. She was admitted to MICU for concern of hepatorenal syndrome and initiation of CRRT.     - Shock state maintain MAP > 65,  pt with intermittent Levophed requirement, cont Midodrine.   - Continue Ceftriaxone for E. coli UTI and bacteremia  -JULIANA w/ likely hepatorenal now on CRRT w/ plan for HD monday  - Acute Liver failure, in the setting of known EtOh use, s/p NAC x 5 days   - 3cm liver lesion, mri when stable  -cont Lactulose and Rifaximin    - wbc increased on abx w/o clear source-skin intact, no tappable ascites, no resp sx  - repeat bcx sent, f/u      Prognosis guarded given severe liver and kidney failure.

## 2021-09-05 NOTE — PROGRESS NOTE ADULT - ATTENDING COMMENTS
#Anuric ATN/lynn  cont CRRT  #Anemia- cont to monitor hb trend, prbc as needed  #hyponatremia- stable  #metabolic acidosis- monitor bicarb trend  #hypotension- pressors per MICU

## 2021-09-06 NOTE — PROGRESS NOTE ADULT - SUBJECTIVE AND OBJECTIVE BOX
Authored By:  Zeina Humphrey MD  PGY-3, Internal Medicine    INTERVAL HPI/OVERNIGHT EVENTS:     SUBJECTIVE: Patient seen and examined at bedside this morning.     OBJECTIVE:    VITAL SIGNS:  ICU Vital Signs Last 24 Hrs  T(C): 37.1 (06 Sep 2021 04:00), Max: 37.8 (06 Sep 2021 00:00)  T(F): 98.8 (06 Sep 2021 04:00), Max: 100 (06 Sep 2021 00:00)  HR: 114 (06 Sep 2021 07:45) (94 - 122)  BP: 93/51 (06 Sep 2021 07:45) (71/40 - 142/63)  BP(mean): 69 (06 Sep 2021 07:45) (49 - 91)  ABP: --  ABP(mean): --  RR: 19 (06 Sep 2021 07:45) (12 - 27)  SpO2: 100% (06 Sep 2021 07:45) (96% - 100%)    09-05 @ 07:01  -  09-06 @ 07:00  --------------------------------------------------------  IN: 3284.7 mL / OUT: 2058 mL / NET: 1226.7 mL    09-06 @ 07:01  -  09-06 @ 07:54  --------------------------------------------------------  IN: 111.6 mL / OUT: 0 mL / NET: 111.6 mL    CAPILLARY BLOOD GLUCOSE    POCT Blood Glucose.: 217 mg/dL (05 Sep 2021 05:31)    PHYSICAL EXAM:  GENERAL: Lethargic, Jaundiced  HEENT: scleral icterus  CHEST/LUNG: Clear to auscultation bilaterally; No crackles, rhonchi, wheezing, or rubs  HEART: Regular rate and rhythm; No murmurs, rubs, or gallops  ABDOMEN: Soft, +diffusely tender to palpation, +distended; Bowel sounds present  EXTREMITIES:  no edema  SKIN: No rashes or lesions  NERVOUS SYSTEM: aaox0 lethargic, states "yes/no" to questions  LINES: PANTERA Barone central line            MEDICATIONS:  MEDICATIONS  (STANDING):  cefTRIAXone   IVPB 1000 milliGRAM(s) IV Intermittent every 24 hours  chlorhexidine 4% Liquid 1 Application(s) Topical <User Schedule>  CRRT Treatment    <Continuous>  dexMEDEtomidine Infusion 0.2 MICROgram(s)/kG/Hr (4.05 mL/Hr) IV Continuous <Continuous>  dextrose 40% Gel 15 Gram(s) Oral once  dextrose 50% Injectable 25 Gram(s) IV Push once  dextrose 50% Injectable 12.5 Gram(s) IV Push once  dextrose 50% Injectable 25 Gram(s) IV Push once  folic acid 1 milliGRAM(s) Oral daily  glucagon  Injectable 1 milliGRAM(s) IntraMuscular once  insulin lispro (ADMELOG) corrective regimen sliding scale   SubCutaneous every 6 hours  lactulose Syrup 20 Gram(s) Oral every 24 hours  midodrine. 20 milliGRAM(s) Oral every 8 hours  norepinephrine Infusion 0.05 MICROgram(s)/kG/Min (7.59 mL/Hr) IV Continuous <Continuous>  Phoxillum Filtration BK 4 / 2.5 5000 milliLiter(s) (1000 mL/Hr) CRRT <Continuous>  PrismaSOL Filtration BGK 0 / 2.5 5000 milliLiter(s) (200 mL/Hr) CRRT <Continuous>  PrismaSOL Filtration BGK 4 / 2.5 5000 milliLiter(s) (800 mL/Hr) CRRT <Continuous>  QUEtiapine 25 milliGRAM(s) Oral every 12 hours  rifAXIMin 550 milliGRAM(s) Oral two times a day  simethicone 80 milliGRAM(s) Chew two times a day  thiamine 100 milliGRAM(s) Oral daily  ursodiol Tablet 500 milliGRAM(s) Oral every 12 hours    MEDICATIONS  (PRN):  sodium chloride 0.9% lock flush 10 milliLiter(s) IV Push every 1 hour PRN Pre/post blood products, medications, blood draw, and to maintain line patency    ALLERGIES:  Allergies    No Known Allergies    Intolerances    LABS:                        7.3    25.29 )-----------( 86       ( 05 Sep 2021 23:45 )             22.6     Hemoglobin: 7.3 g/dL (09-05 @ 23:45)  Hemoglobin: 8.3 g/dL (09-05 @ 11:43)  Hemoglobin: 6.9 g/dL (09-05 @ 05:37)  Hemoglobin: 7.3 g/dL (09-05 @ 00:13)  Hemoglobin: 7.5 g/dL (09-04 @ 00:16)    CBC Full  -  ( 05 Sep 2021 23:45 )  WBC Count : 25.29 K/uL  RBC Count : 2.11 M/uL  Hemoglobin : 7.3 g/dL  Hematocrit : 22.6 %  Platelet Count - Automated : 86 K/uL  Mean Cell Volume : 107.1 fl  Mean Cell Hemoglobin : 34.6 pg  Mean Cell Hemoglobin Concentration : 32.3 gm/dL  Auto Neutrophil # : x  Auto Lymphocyte # : x  Auto Monocyte # : x  Auto Eosinophil # : x  Auto Basophil # : x  Auto Neutrophil % : x  Auto Lymphocyte % : x  Auto Monocyte % : x  Auto Eosinophil % : x  Auto Basophil % : x    09-06    135  |  99  |  76<H>  ----------------------------<  262<H>  5.7<H>   |  14<L>  |  2.22<H>    Ca    9.4      06 Sep 2021 05:49  Phos  5.3     09-06  Mg     2.8     09-06    TPro  5.8<L>  /  Alb  3.3  /  TBili  23.0<H>  /  DBili  x   /  AST  87<H>  /  ALT  63<H>  /  AlkPhos  200<H>  09-05    Creatinine Trend: 2.22<--, 1.76<--, 1.35<--, 1.39<--, 1.49<--, 1.37<--  LIVER FUNCTIONS - ( 05 Sep 2021 23:45 )  Alb: 3.3 g/dL / Pro: 5.8 g/dL / ALK PHOS: 200 U/L / ALT: 63 U/L / AST: 87 U/L / GGT: x           PT/INR - ( 05 Sep 2021 23:45 )   PT: 17.1 sec;   INR: 1.45 ratio         PTT - ( 05 Sep 2021 23:45 )  PTT:30.5 sec    hs Troponin:    05:44 - VBG - pH: 7.34  | pCO2: 33    | pO2: 41    | Lactate: 4.2    23:40 - VBG - pH: 7.39  | pCO2: 34    | pO2: 37    | Lactate: 3.5      CSF:    EKG:   MICROBIOLOGY:    IMAGING:    Labs, imaging, EKG personally reviewed    RADIOLOGY & ADDITIONAL TESTS: Reviewed.

## 2021-09-06 NOTE — PROGRESS NOTE ADULT - ASSESSMENT
56 y.o. F with PMH of ETOH misuse, ETOH cirrhosis, HLD, IBS, anxiety, presents with decompensated alcoholic cirrhosis c/b HRS, likely contributing to acute renal failure and hyponatremia s/p now on CRRT, transferred to [facility] MICU, course c/b hepatic encephalopathy.    Neuro  - aaox0, lethargic, but awakens (but encephalopathic) when off precedex    #Delirium  -seroquel 25 q12  -c/w precedex, has been needed to prevent pt from pulling at lines; wean     #Hepatic encephalopathy  - lactulose, rifaximin  BID  - titrate to 4-5BM daily  - stool count    # ETOH Misuse  - Continue to monitor for w/d symptoms; pt reported last drink was 2.5 wks ago  - SW consult & cessation counseling   - c/w thiamine and folate     # Anxiety  - C/w Xanax PRN   - See ISTOP   - c/w precedex, has been needed to prevent pt from pulling at lines    Cardiovascular  #Hypotension  - MAP goal to 65, put unlikely to have renal recovery  - midodrine 20q8h  - patient intermittently requiring levophed    Pulmonary  - Normal O2 Saturation on room air, monitor for volume overload in s/o ARF and hypervolemia   - monitor ability to protect airway    GI  # Hepatorenal syndrome   - See Renal     # ETOH Cirrhosis   - s/p solumedrol 40mg IV qD  -- discontinued as per hepatology  - Maddrey score: 52.4 indicative of poor prognosis  - MELD Score: 38 --?65-66% risk of 90 day mortality   - small ascites on U/S abd, no pockets available to tap    - Trend liver labs daily CMP   - s/p NAC - started 8/30 - 9/3 for 5 days    Endoscopy in 2019: no varices, stomach with congestion c/w gastritis vs portal gastropathy    Negative Hep B surface Ag, Hep B core Ab, Hep A IgM  Weakly reactive Hep C Ab  [x]Hep E IgM sent  [x]check serum FARHANA, smooth muscle Ab, anti-LKM-1 Ab, alpha-1 antitrypsin, ferritin, ceruloplasmin, copper, immunoglobulin panel (IgG, IgA, IgM)    # Hep C weakly reactive, but Hep C RNA viral load negative    #R/o portal vein thrombosis  triple phase CT A/P to r/o portal vein thrombosis --- negative --> Hep gtt discontinued    #abd distension  +BS, soft, sl tender to palpation  still with BMs  Abd XR  If leukocytosis continues to worsen or pressor requirements go up will consider CT A/P      #Anuric  See ID section    Renal  # Hepatorenal syndrome, unlikely to have renal recovery as pt remains anuric  - Suspect acute renal failure 2/2 HRS in s/o alcoholic hepatitis and cirrhosis   - s/p octreotride gtt  - s/p albumin   - On CRRT, continue through weekend, plan for iHD on Monday 9/6  - Baseline Cr 2.5  - Monitor Cr and avoid nephrotoxic agents  - Renally dose meds   - MAP goal 65  - Monitor I/Os   [ ] bmp q6h while on CRRT    #Hyperkalemia  - patient noted to be hyperkalemic to 5.5- 5.7 overnight  - s/p 1 dose of lokelma  - will c/w temporizing measures  - patient will likely require restarting CVVHD as she has been requiring pressor support and is unlikely to tolerate intermittent HD    # Hyponatremia - 114 on presentation, now resolved.  - Euvolemic on exam; possibly 2/2 ARF and or poor oral intake in s/o ETOH use   - Angela 20, suggestive of decreased PO intake    ID  CXR clear lungs  Leukocytosis rising while on ceftriaxone, unclear cause.    #E.coli bacteremia likely 2/2 E.coli UTI  -CTX 8/30 - 9/2  - zosyn 9/2 - 9/4  - CTX 9/4 - current  E. coli in UTI -->100k E.coli CFU, sensitive to CTX  E. coli  in blood culture  sensitive to CTX  - F/u repeat BCx sent on 9/5    #SBP PPx   - Minimal ascites seen on POCUS 9/1 -- nothing to tap  - Discussed with GI attending, no need to consult IR right now as nothing to tap  - On CTX for UTI    Heme  # Macrocytic anemia & Thrombocytopenia   - Likely in s/o poor nutritional status from ETOH use  - B12 normal, folate iron studies  - Folate thiamine supplement  - s/p total 3U PRBC transfusion this admission (8/30, 9/2, 9/5)    # Coagulopathy   - In s/o decompensated alcoholic cirrhosis and ARF   - S/p DDAVP for likely plt dysfunction   - cont to trend daily PT INR PTT   - INR worsening overall    Ethics  # FULL code   -  is surrogate decision maker    Michele updated 9/2

## 2021-09-06 NOTE — PROGRESS NOTE ADULT - ATTENDING COMMENTS
#Anuric ATN/lynn  restart CRRT  would not be able to tolerate intermittent HD today  #hyperkalemia- restart CRRT  #Anemia- cont to monitor hb trend, prbc as needed  #hyponatremia- stable  #metabolic acidosis- monitor bicarb trend  #hypotension- pressors per MICU    d/w MICU

## 2021-09-06 NOTE — PROGRESS NOTE ADULT - SUBJECTIVE AND OBJECTIVE BOX
[facility] DIVISION OF KIDNEY DISEASES AND HYPERTENSION -- FOLLOW UP NOTE  --------------------------------------------------------------------------------  Chief Complaint:    24 hour events/subjective:    seen and examined this morning   remains critically ill in the ICU   unable to express concerns     PAST HISTORY  --------------------------------------------------------------------------------  No significant changes to PMH, PSH, FHx, SHx, unless otherwise noted    ALLERGIES & MEDICATIONS  --------------------------------------------------------------------------------  Allergies    No Known Allergies    Intolerances      Standing Inpatient Medications  chlorhexidine 4% Liquid 1 Application(s) Topical <User Schedule>  CRRT Treatment    <Continuous>  CRRT Treatment    <Continuous>  dexMEDEtomidine Infusion 0.2 MICROgram(s)/kG/Hr IV Continuous <Continuous>  dextrose 40% Gel 15 Gram(s) Oral once  dextrose 50% Injectable 25 Gram(s) IV Push once  dextrose 50% Injectable 12.5 Gram(s) IV Push once  dextrose 50% Injectable 25 Gram(s) IV Push once  folic acid 1 milliGRAM(s) Oral daily  glucagon  Injectable 1 milliGRAM(s) IntraMuscular once  insulin lispro (ADMELOG) corrective regimen sliding scale   SubCutaneous every 6 hours  lactulose Syrup 20 Gram(s) Oral every 24 hours  meropenem  IVPB      midodrine. 20 milliGRAM(s) Oral every 8 hours  norepinephrine Infusion 0.05 MICROgram(s)/kG/Min IV Continuous <Continuous>  phenylephrine    Infusion 0.4 MICROgram(s)/kG/Min IV Continuous <Continuous>  Phoxillum Filtration BK 4 / 2.5 5000 milliLiter(s) CRRT <Continuous>  Phoxillum Filtration BK 4 / 2.5 5000 milliLiter(s) CRRT <Continuous>  PrismaSOL Filtration BGK 0 / 2.5 5000 milliLiter(s) CRRT <Continuous>  PrismaSOL Filtration BGK 4 / 2.5 5000 milliLiter(s) CRRT <Continuous>  PrismaSOL Filtration BGK 4 / 2.5 5000 milliLiter(s) CRRT <Continuous>  PrismaSOL Filtration BGK 4 / 2.5 5000 milliLiter(s) CRRT <Continuous>  QUEtiapine 25 milliGRAM(s) Oral every 12 hours  rifAXIMin 550 milliGRAM(s) Oral two times a day  simethicone 80 milliGRAM(s) Chew two times a day  thiamine 100 milliGRAM(s) Oral daily  ursodiol Tablet 500 milliGRAM(s) Oral every 12 hours    PRN Inpatient Medications  sodium chloride 0.9% lock flush 10 milliLiter(s) IV Push every 1 hour PRN      REVIEW OF SYSTEMS  unable to obtain     VITALS/PHYSICAL EXAM  --------------------------------------------------------------------------------  T(C): 38.2 (09-06-21 @ 12:00), Max: 38.2 (09-06-21 @ 12:00)  HR: 115 (09-06-21 @ 13:30) (94 - 122)  BP: 95/54 (09-06-21 @ 13:30) (71/40 - 142/63)  RR: 19 (09-06-21 @ 13:30) (12 - 27)  SpO2: 99% (09-06-21 @ 13:30) (96% - 100%)  Wt(kg): --        09-05-21 @ 07:01  -  09-06-21 @ 07:00  --------------------------------------------------------  IN: 3284.7 mL / OUT: 2058 mL / NET: 1226.7 mL    09-06-21 @ 07:01  -  09-06-21 @ 13:56  --------------------------------------------------------  IN: 722.7 mL / OUT: 0 mL / NET: 722.7 mL        Physical Exam:  	Gen: ill appearing  	HEENT: MMM  	Pulm: CTA B/L  	CV: S1S2  	Abd: Soft, +BS   	Ext: + LE edema B/L  	Neuro: Awake not alert  	Skin: Warm and dry  	Vascular access: R IJ      LABS/STUDIES  --------------------------------------------------------------------------------              6.8    25.78 >-----------<  118      [09-06-21 @ 11:37]              20.5     137  |  100  |  90  ----------------------------<  227      [09-06-21 @ 11:37]  5.7   |  14  |  2.52        Ca     9.5     [09-06-21 @ 11:37]      Mg     3.0     [09-06-21 @ 11:37]      Phos  5.7     [09-06-21 @ 11:37]    TPro  5.8  /  Alb  3.3  /  TBili  23.0  /  DBili  x   /  AST  87  /  ALT  63  /  AlkPhos  200  [09-05-21 @ 23:45]    PT/INR: PT 17.1 , INR 1.45       [09-05-21 @ 23:45]  PTT: 30.5       [09-05-21 @ 23:45]      Creatinine Trend:  SCr 2.52 [09-06 @ 11:37]  SCr 2.22 [09-06 @ 05:49]  SCr 1.76 [09-05 @ 23:45]  SCr 1.35 [09-05 @ 17:42]  SCr 1.39 [09-05 @ 11:43]    Urinalysis - [09-02-21 @ 22:57]      Color Dark Yellow / Appearance Turbid / SG 1.027 / pH 5.5      Gluc Trace / Ketone Small  / Bili Moderate / Urobili Negative       Blood Large / Protein 30 mg/dL / Leuk Est Small / Nitrite Negative      RBC 11 / WBC 2 / Hyaline 3 / Gran  / Sq Epi  / Non Sq Epi 1 / Bacteria Negative      Iron 119, TIBC 158, %sat 75      [08-29-21 @ 23:03]  Ferritin 1032      [09-01-21 @ 10:57]  TSH 1.38      [08-29-21 @ 23:03]    HBsAg Nonreact      [08-29-21 @ 23:26]  HCV 1.08, Weakly Reactive Hepatitis C AB  S/CO Ratio                        Interpretation  < 1.00                                   Non-Reactive  1.00 - 4.99                         Weakly-Reactive  >= 5.00                                Reactive  Non-Reactive: A person with a non-reactive HCV antibody result is  considered uninfected.  No further action is needed unless recent  infection is suspected.  In these cases, consider repeat testing later to  detect seroconversion..  Weakly-Reactive: HCV antibody test is abnormal, HCV RNA Qualitative test  will follow.  Reactive: HCV antibody test is abnormal, HCV RNA Qualitative test will  follow.  Note: HCV antibody testing is performed on the Abbott  system.      [08-29-21 @ 23:26]    FARHANA: titer Negative, pattern --      [09-01-21 @ 10:48]

## 2021-09-06 NOTE — PROGRESS NOTE ADULT - ATTENDING COMMENTS
Agree with above except as noted. 56F EtOH abuse, cirrhosis/portal HTN, IBS, and HLD who is transferred from [facility] for liver and renal dysfunction. She was admitted to MICU for concern of hepatorenal syndrome and initiation of CRRT.     - Shock state maintain MAP > 65,  now needing Levophed at slightly increasing doses  - cont Midodrine  - concern for septic shock despite ctx tx (for ecoli bacteremia), will broaden abx regimen  - f/u repeat cx, so far ngtd  - wbc increased on abx w/o clear source-skin intact, no tappable ascites, no resp sx  - central lines appear clean but will remove and palce new HD cath  -JULIANA w/ plan to cont CRRT given pressor requirements  - Acute Liver failure, in the setting of known EtOh use, s/p NAC x 5 days   - 3cm liver lesion, mri when stable out of ICU  -cont Lactulose and Rifaximin for HE              Prognosis guarded given severe liver and kidney failure.

## 2021-09-06 NOTE — PROGRESS NOTE ADULT - ASSESSMENT
Pt. with JULIANA and hyponatremia         JULIANA (acute kidney injury).    Pt with JULIANA n the setting of severe liver disease.   Upon admission to OSH Scr was 16 (8/28/21) with . .   Started on CRRT on 8/29/21; will continue today  Goal is to maintain net even balance   Will continue CRRT. Monitor labs and urine output. Avoid NSAIDs, ACEI/ARBS, RCA and nephrotoxins. Dose medications as per CRRT.    Anemia.   Pt. with anemia of unclear etiology. Iron studies done on 8/29/21 showed increased % sat is elevated to 75%, TIBC is low at 158.   Received 2 units PRBC (8/30/21 and then 9/2/21).    Hb was 6.8 this morning. Blood transfusion as primary team.    Metabolic acidosis.   Pt. with metabolic acidosis in the setting of uremia and JULIANA.   SCO is low at 14 today.   Continue CRRT.    If any questions, please feel free to contact me     Santy Singh  Nephrology Fellow  [facility] Pager: [phone]

## 2021-09-07 NOTE — PROGRESS NOTE ADULT - PROBLEM SELECTOR PLAN 1
Pt. with severe hyponatremia in setting of severe liver disease. Serum sodium was 114 on admission to OSH (8/28), most recent is 118 (8/29), stable correction in 24hrs. Pt. with most likely acute hyponatremia. UOsm is 340 and Sosm is 317 with Nicolas of 20. Elevated serum osmolarity in the setting of hyperbilirubinemia and uremia. Do not correct serum sodium >6-8 meq/day. SNa is 136 today.
Pt. with severe hyponatremia in setting of severe liver disease. Serum sodium was 114 on admission to OSH (8/28), most recent is 118 (8/29), stable correction in 24hrs. Pt. with most likely acute hyponatremia. UOsm is 340 and Sosm is 317 with Nicolas of 20. Elevated serum osmolarity in the setting of hyperbilirubinemia and uremia. Do not correct serum sodium >6-8 meq/day. SNa is 135 today.
Pt. with severe hyponatremia in setting of severe liver disease. Serum sodium was 114 on admission to OSH (8/28), most recent is 118 (8/29), stable correction in 24hrs. Pt. with most likely acute hyponatremia. UOsm is 340 and Sosm is 317 with Nicolas of 20. Elevated serum osmolarity in the setting of hyperbilirubinemia and uremia. Do not correct serum sodium >6-8 meq/day. SNa is 135 today.
Pt. with severe hyponatremia in setting of severe liver disease. Serum sodium was 114 on admission to OSH (8/28), most recent is 118 (8/29), stable correction in 24hrs. Pt. with most likely acute hyponatremia. UOsm is 340 and Sosm is 317 with Nicolas of 20. Elevated serum osmolarity in the setting of hyperbilirubinemia and uremia. Do not correct serum sodium >6-8 meq/day. SNa is 136 today.
Pt. with severe hyponatremia (resolving) in setting of severe liver disease. Serum sodium was 114 on admission to OSH (8/28), most recent is 118 (8/29), stable correction in 24hrs. Pt. with most likely acute hyponatremia. UOsm is 340 and Sosm is 317 with Nicolas of 20. Elevated serum osmolarity in the setting of hyperbilirubinemia and uremia. Do not correct serum sodium >6-8 meq/day. SNa is 142 today.
Pt. with severe hyponatremia in setting of severe liver disease. Serum sodium was 114 on admission to OSH (8/28), most recent is 118 (8/29), stable correction in 24hrs. Pt. with most likely acute hyponatremia. UOsm is 340 and Sosm is 317 with Nicolas of 20. Elevated serum osmolarity in the setting of hyperbilirubinemia and uremia. Do not correct serum sodium >6-8 meq/day. SNa is 134 today.

## 2021-09-07 NOTE — PROGRESS NOTE ADULT - PROBLEM SELECTOR PROBLEM 2
JULIANA (acute kidney injury)

## 2021-09-07 NOTE — CHART NOTE - NSCHARTNOTEFT_GEN_A_CORE
Case discussed extensively with patient's  and surrogate decision maker, Michele Maya. Reviewed details of patient's current medical condition, diagnoses and her overall prognosis. Explained that the patient has multi-organ dysfunction, all likely sequelae of her advanced liver disease. Informed Mr. Maya that the patient has been requiring vasopressors to maintain adequate blood pressures and that she has also required CRRT to help with acidosis and uremia. Also informed Mr. Maya that the patient is not currently a liver transplant candidate and that her overall prognosis is poor. Mr. Maya expressed good understanding of his wife's current condition and prognosis. He stated that he did discuss the matter of life support as well as resuscitation with his wife previously and he reports that she would not want to be on mechanical ventilation or have chest compressions if her heart were to stop. When asked about the use of vasopressors or continued use of CRRT for acidosis and correction of electrolyte abnormalities, Mr. Maya expressed that we may continue with those as he would like to have some time for her children to come visit. However, he expressed that he would wish for us to stop aggressive measures and switch to comfort measures once the patient's children have had a chance to see her. Case discussed extensively with patient's  and surrogate decision maker, Michele Maya. Reviewed details of patient's current medical condition, diagnoses and her overall prognosis. Explained that the patient has multi-organ dysfunction, all likely sequelae of her advanced liver disease. Informed Mr. Maya that the patient has been requiring vasopressors to maintain adequate blood pressures and that she has also required CRRT to help with acidosis and uremia. Also informed Mr. Maya that the patient is not currently a liver transplant candidate and that her overall prognosis is poor. Mr. Maya expressed good understanding of his wife's current condition and prognosis. He stated that he did discuss the matter of life support as well as resuscitation with his wife previously and he reports that she would not want to be on mechanical ventilation or have chest compressions if her heart were to stop. When asked about the use of vasopressors or continued use of CRRT for acidosis and correction of electrolyte abnormalities, he expressed that he would wish for us to stop aggressive measures and switch to comfort measures. Case discussed extensively with patient's  and surrogate decision maker, Michele Simraza. Reviewed details of patient's current medical condition, diagnoses and her overall prognosis. Explained that the patient has multi-organ dysfunction, all likely sequelae of her advanced liver disease. Informed Mr. Maya that the patient has been requiring vasopressors to maintain adequate blood pressures and that she has also required CRRT to help with acidosis and uremia. Also informed Mr. Maya that the patient is not currently a liver transplant candidate and that her overall prognosis is poor. Mr. Maya expressed good understanding of his wife's current condition and prognosis. He stated that he did discuss the matter of life support as well as resuscitation with his wife previously and he reports that she would not want to be on mechanical ventilation or have chest compressions if her heart were to stop. When asked about the use of vasopressors or continued use of CRRT for acidosis and correction of electrolyte abnormalities, he expressed that he would wish for us to stop aggressive measures and switch to comfort measures. Informed Mr. Maya that if pressors are stopped the patient may pass within minutes to hours and Mr. Maya expressed understanding of this fact. He stated that Ms. Maya would never have wanted such aggressive life support measures and that the focus should be on comfort for her.     At this time will discontinue all vasopressors as well as CRRT. Will order dilaudid, ativan and robinul PRNs for comfort. Will also discontinue lab draws and checking vitals. MOLST form completed and signed by MICU attending. DNR/DNI order placed in EMR.    Zeina Humphrey MD  PGY-3, MICU

## 2021-09-07 NOTE — PROGRESS NOTE ADULT - SUBJECTIVE AND OBJECTIVE BOX
[facility] DIVISION OF KIDNEY DISEASES AND HYPERTENSION -- FOLLOW UP NOTE  --------------------------------------------------------------------------------  HPI: 56-year-old Female with PMhx ETOH use disorder (last drink ~2 weeks ago), ETOH cirrhosis with ascites, splenomegaly, HLD, IBS, anxiety presented from [facility] to [facility] MICU on 8/29/21 for severe hyponatremia and JULIANA requiring CRRT. Nephrology consultation requested for JULIANA and hyponatremia. Pt. currently on CRRT since 8/29/21.      24 hour events/subjective: Pt. seen and examined at bedside. Appears calm and minimally conversant, moving extremities voluntarily Pt. started on vasopressor support for low BP. CRRT was discontinued yesterday 2/2 clotting.    PAST HISTORY  --------------------------------------------------------------------------------  No significant changes to PMH, PSH, FHx, SHx, unless otherwise noted    ALLERGIES & MEDICATIONS  --------------------------------------------------------------------------------  Allergies    No Known Allergies    Intolerances      Standing Inpatient Medications  chlorhexidine 4% Liquid 1 Application(s) Topical <User Schedule>  chlorhexidine 4% Liquid 1 Application(s) Topical <User Schedule>  CRRT Treatment    <Continuous>  CRRT Treatment    <Continuous>  dexMEDEtomidine Infusion 0.2 MICROgram(s)/kG/Hr IV Continuous <Continuous>  dextrose 40% Gel 15 Gram(s) Oral once  dextrose 50% Injectable 25 Gram(s) IV Push once  dextrose 50% Injectable 12.5 Gram(s) IV Push once  dextrose 50% Injectable 25 Gram(s) IV Push once  folic acid 1 milliGRAM(s) Oral daily  glucagon  Injectable 1 milliGRAM(s) IntraMuscular once  insulin lispro (ADMELOG) corrective regimen sliding scale   SubCutaneous every 6 hours  lactulose Syrup 20 Gram(s) Oral every 24 hours  meropenem  IVPB 500 milliGRAM(s) IV Intermittent every 8 hours  midodrine. 20 milliGRAM(s) Oral every 8 hours  norepinephrine Infusion 0.05 MICROgram(s)/kG/Min IV Continuous <Continuous>  pantoprazole Infusion 8 mG/Hr IV Continuous <Continuous>  phenylephrine    Infusion 1 MICROgram(s)/kG/Min IV Continuous <Continuous>  PrismaSOL Filtration BGK 0 / 2.5 5000 milliLiter(s) CRRT <Continuous>  PrismaSOL Filtration BGK 0 / 2.5 5000 milliLiter(s) CRRT <Continuous>  PrismaSOL Filtration BGK 4 / 2.5 5000 milliLiter(s) CRRT <Continuous>  PrismaSOL Filtration BGK 4 / 2.5 5000 milliLiter(s) CRRT <Continuous>  PrismaSOL Filtration BGK 4 / 2.5 5000 milliLiter(s) CRRT <Continuous>  PrismaSOL Filtration BGK 4 / 2.5 5000 milliLiter(s) CRRT <Continuous>  QUEtiapine 25 milliGRAM(s) Oral every 12 hours  rifAXIMin 550 milliGRAM(s) Oral two times a day  simethicone 80 milliGRAM(s) Chew two times a day  thiamine 100 milliGRAM(s) Oral daily  ursodiol Tablet 500 milliGRAM(s) Oral every 12 hours  vasopressin Infusion 0.04 Unit(s)/Min IV Continuous <Continuous>    PRN Inpatient Medications  sodium chloride 0.9% lock flush 10 milliLiter(s) IV Push every 1 hour PRN      REVIEW OF SYSTEMS  --------------------------------------------------------------------------------  Limited as pt. is somnolent.    VITALS/PHYSICAL EXAM  --------------------------------------------------------------------------------  T(C): 37.7 (09-07-21 @ 08:00), Max: 37.7 (09-07-21 @ 08:00)  HR: 114 (09-07-21 @ 11:45) (103 - 126)  BP: 86/47 (09-07-21 @ 02:30) (73/39 - 119/53)  RR: 28 (09-07-21 @ 11:45) (15 - 35)  SpO2: 100% (09-07-21 @ 11:45) (68% - 100%)  Wt(kg): --    09-06-21 @ 07:01  -  09-07-21 @ 07:00  --------------------------------------------------------  IN: 4017.1 mL / OUT: 311 mL / NET: 3706.1 mL    09-07-21 @ 07:01  -  09-07-21 @ 12:05  --------------------------------------------------------  IN: 768 mL / OUT: 0 mL / NET: 768 mL    Physical Exam:  	Gen: ill appearing  	HEENT: MMM  	Pulm: CTA B/L  	CV: S1S2  	Abd: Soft, +BS   	Ext: + LE edema B/L  	Neuro: Awake not alert  	Skin: Warm and dry  	Vascular access: R IJ HD catheter    LABS/STUDIES  --------------------------------------------------------------------------------              8.5    29.80 >-----------<  125      [09-07-21 @ 10:07]              25.1     142  |  102  |  97  ----------------------------<  259      [09-07-21 @ 07:36]  5.2   |  14  |  2.94        Ca     8.8     [09-07-21 @ 07:36]      Mg     2.7     [09-07-21 @ 07:36]      Phos  6.4     [09-07-21 @ 07:36]    TPro  5.4  /  Alb  3.0  /  TBili  21.4  /  DBili  x   /  AST  90  /  ALT  64  /  AlkPhos  185  [09-06-21 @ 23:43]    PT/INR: PT 17.1 , INR 1.45       [09-05-21 @ 23:45]  PTT: 30.5       [09-05-21 @ 23:45]    Creatinine Trend:  SCr 2.94 [09-07 @ 07:36]  SCr 2.80 [09-07 @ 05:37]  SCr 2.80 [09-06 @ 23:43]  SCr 2.95 [09-06 @ 18:28]  SCr 2.52 [09-06 @ 11:37]    Urinalysis - [09-02-21 @ 22:57]      Color Dark Yellow / Appearance Turbid / SG 1.027 / pH 5.5      Gluc Trace / Ketone Small  / Bili Moderate / Urobili Negative       Blood Large / Protein 30 mg/dL / Leuk Est Small / Nitrite Negative      RBC 11 / WBC 2 / Hyaline 3 / Gran  / Sq Epi  / Non Sq Epi 1 / Bacteria Negative    Iron 119, TIBC 158, %sat 75      [08-29-21 @ 23:03]  Ferritin 1032      [09-01-21 @ 10:57]  TSH 1.38      [08-29-21 @ 23:03]    HBsAg Nonreact      [08-29-21 @ 23:26]  HCV 1.08, Weakly Reactive Hepatitis C AB  S/CO Ratio                        Interpretation  < 1.00                                   Non-Reactive  1.00 - 4.99                         Weakly-Reactive  >= 5.00                                Reactive  Non-Reactive: A person with a non-reactive HCV antibody result is  considered uninfected.  No further action is needed unless recent  infection is suspected.  In these cases, consider repeat testing later to  detect seroconversion..  Weakly-Reactive: HCV antibody test is abnormal, HCV RNA Qualitative test  will follow.  Reactive: HCV antibody test is abnormal, HCV RNA Qualitative test will  follow.  Note: HCV antibody testing is performed on the Abbott  system.      [08-29-21 @ 23:26]    FARHANA: titer Negative, pattern --      [09-01-21 @ 10:48]

## 2021-09-07 NOTE — PROGRESS NOTE ADULT - ASSESSMENT
57 yo f pmhx ETOH abuse (last drink ~2 weeks ago), ETOH cirrhosis c/b ascites, splenomegaly, IBS who was transferred from [place] ICU 8/29. She initially presented to [place] ED from home after being sent in by her GI Dr. López for abnl labs.  Per patient over the last week she has noticed her sclera/skin to be bright orange, fatigued, lightheaded, nausea, poor appetite and abdominal bloating. In [place] ED, patient found to be grossly jaundiced, labs significant for Na 114, BUN/Cr 146/16.3, serum co2 14, tbili 27.1, ast/alt 205/113. While in [place] ICU, pt was treated for alcoholic hepatitis s/p solumedrol, hepatorenal syndrome s/p octreotide, albumin, and acute renal failure. Pt was seen by nephrology, who recommended transfer to tertiary care center for initiation of CVVH. Hyponatremia was treated with NS. Pt was also maintained on NaHCO3 drip for metabolic acidosis. Lactulose was titrated to 20mg TID. Spironolactone was not given due to ARF in s/o HRS. GI was consulted for alcoholic hepatitis as well as possible liver transplant eval, which also agreed to transfer to tertiary center for further evaluation. An ICU to ICU transfer was initiated and pt was transferred to [facility] 8/29 and was started on CVVHDF. Hepatology was consulted due to decompensated ETOH cirrhosis. OP GI Dr. Williams López.     Impression:  #Decompensated ETOH cirrhosis- MELDNa 36 on 9/5 (on CVVHDF); MDF 41.9 on 9/5 (poor prognosis); HAV/HBV neg  -Ascites: small ascites on US abd 9/1  -Varices: Last EGD 04/2019 w/o EV  -HCC: hypoattenuating lesion 3.1 x 2.5 cm seen on CT A/P 8/30 rec MRI  -HE: oriented to person on exam  #E coli UTI- UA with +WBC>150 and +LE: urine cx with >100K CFU E coli; s/p CTX (8/31-9/1), Zosyn (9/2-9/4), meropenem (9/6->)  #+HCV Ab- HCV RNA PCR negative; false positive Ab or pt may have cleared infection  #dark stools c/f melena with downtrending Hgb- s/p 2 u pRBC 9/6-9/7 with Hgb ~7    Recommendations:  - f/u Hep E IgM  - no plan for steroids despite high MDF due to c/f underlying infection  - will consider EGD  - rifaximin 550 mg BID, lactulose 20 mg q6h; titrate to goal 3-4 BM/day  - midodrine 20 mg TID  - ursodiol 500 mg BID  - will need MRI abd w wo cont to better evaluate hypoattenuating lesion 3.1 x 2.5 cm seen on CT A/P 8/30; can be done when pt is clinically stable   - s/p NAC (8/30-9/4)  - s/p albumin 25% 50 mL q6h (8/29-9/3)  - trend INR, platelet and CMP daily       We will continue to follow.    Berenice Rincon MD  GI Fellow, PGY-4  Available via [employer] Teams    NON-URGENT CONSULTS:  Please email [email or web address]  [email or web address]  AT NIGHT AND ON WEEKENDS:  Contact on-call GI fellow via answering service ([phone]) from 5pm-8am and on weekends/holidays  MONDAY-FRIDAY 8AM-5PM:  Pager# [phone] ([facility]) or [phone] ([facility])  GI Phone# [phone] ([facility])   57 yo f pmhx ETOH abuse (last drink ~2 weeks ago), ETOH cirrhosis c/b ascites, splenomegaly, IBS who was transferred from [place] ICU 8/29. She initially presented to [place] ED from home after being sent in by her GI Dr. López for abnl labs.  Per patient over the last week she has noticed her sclera/skin to be bright orange, fatigued, lightheaded, nausea, poor appetite and abdominal bloating. In [place] ED, patient found to be grossly jaundiced, labs significant for Na 114, BUN/Cr 146/16.3, serum co2 14, tbili 27.1, ast/alt 205/113. While in [place] ICU, pt was treated for alcoholic hepatitis s/p solumedrol, hepatorenal syndrome s/p octreotide, albumin, and acute renal failure. Pt was seen by nephrology, who recommended transfer to tertiary care center for initiation of CVVH. Hyponatremia was treated with NS. Pt was also maintained on NaHCO3 drip for metabolic acidosis. Lactulose was titrated to 20mg TID. Spironolactone was not given due to ARF in s/o HRS. GI was consulted for alcoholic hepatitis as well as possible liver transplant eval, which also agreed to transfer to tertiary center for further evaluation. An ICU to ICU transfer was initiated and pt was transferred to [facility] 8/29 and was started on CVVHDF. Hepatology was consulted due to decompensated ETOH cirrhosis. OP GI Dr. Williams López.     Impression:  #Decompensated ETOH cirrhosis- MELDNa 36 on 9/5 (on CVVHDF); MDF 41.9 on 9/5 (poor prognosis); HAV/HBV neg  -Ascites: small ascites on US abd 9/1  -Varices: Last EGD 04/2019 w/o EV  -HCC: hypoattenuating lesion 3.1 x 2.5 cm seen on CT A/P 8/30 rec MRI  -HE: oriented to person on exam  #E coli UTI- UA with +WBC>150 and +LE: urine cx with >100K CFU E coli; s/p CTX (8/31-9/1), Zosyn (9/2-9/4), meropenem (9/6->)  #+HCV Ab- HCV RNA PCR negative; false positive Ab or pt may have cleared infection  #dark stools c/f melena with downtrending Hgb- s/p 2 u pRBC 9/6-9/7 with Hgb ~7    Recommendations:  - f/u Hep E IgM  - no plan for steroids despite high MDF due to c/f underlying infection  - will plan for EGD in AM 9/8; NPO after MN, please order repeat COVID swab  - please restart octreotide gtt, continue protonix gtt  - transfuse to Hgb 7; 2 large bore PIVs  - please stop simethicone and ursodiol  - please order repeat bl cx x2  - rifaximin 550 mg BID, lactulose 20 mg q6h; titrate to goal 3-4 BM/day  - midodrine 20 mg TID  - will need MRI abd w wo cont to better evaluate hypoattenuating lesion 3.1 x 2.5 cm seen on CT A/P 8/30; can be done when pt is clinically stable   - s/p NAC (8/30-9/4)  - s/p albumin 25% 50 mL q6h (8/29-9/3)  - trend INR, platelet and CMP daily       We will continue to follow.    Berenice Rincon MD  GI Fellow, PGY-4  Available via [employer] Teams    NON-URGENT CONSULTS:  Please email [email or web address]  [email or web address]  AT NIGHT AND ON WEEKENDS:  Contact on-call GI fellow via answering service ([phone]) from 5pm-8am and on weekends/holidays  MONDAY-FRIDAY 8AM-5PM:  Pager# [phone] ([facility]) or [phone] ([facility])  GI Phone# [phone] ([facility])

## 2021-09-07 NOTE — PROCEDURE NOTE - NSPROCNAME_GEN_A_CORE
Central Line Insertion
Gastric Intubation/Gastric Lavage
Arterial Puncture/Cannulation
Central Line Insertion

## 2021-09-07 NOTE — PROGRESS NOTE ADULT - ASSESSMENT
56 y.o. F with PMH of ETOH misuse, ETOH cirrhosis, HLD, IBS, anxiety, presents with decompensated alcoholic cirrhosis c/b HRS, likely contributing to acute renal failure and hyponatremia s/p now on CRRT, transferred to [facility] MICU, course c/b hepatic encephalopathy.    Neuro  - aaox0, lethargic, but awakens (but encephalopathic) when off precedex    #Delirium  -seroquel 25 q12  -c/w precedex, has been needed to prevent pt from pulling at lines; wean     #Hepatic encephalopathy  - lactulose, rifaximin  BID  - titrate to 4-5BM daily  - stool count    # ETOH Misuse  - Continue to monitor for w/d symptoms; pt reported last drink was 2.5 wks ago  - SW consult & cessation counseling   - c/w thiamine and folate     # Anxiety  - C/w Xanax PRN   - See ISTOP   - c/w precedex, has been needed to prevent pt from pulling at lines    Cardiovascular  #Hypotension  - MAP goal to 65, put unlikely to have renal recovery  - Patient now with escalating pressor requirements, now requiring 3 pressors    Pulmonary  - Normal O2 Saturation on room air, monitor for volume overload in s/o ARF and hypervolemia   - monitor ability to protect airway    GI  # Hepatorenal syndrome   - See Renal     # ETOH Cirrhosis   - s/p solumedrol 40mg IV qD  -- discontinued as per hepatology  - Maddrey score: 52.4 indicative of poor prognosis  - MELD Score: 38 --?65-66% risk of 90 day mortality   - small ascites on U/S abd, no pockets available to tap    - Trend liver labs daily CMP   - s/p NAC - started 8/30 - 9/3 for 5 days    Endoscopy in 2019: no varices, stomach with congestion c/w gastritis vs portal gastropathy    Negative Hep B surface Ag, Hep B core Ab, Hep A IgM  Weakly reactive Hep C Ab  [x]Hep E IgM sent  [x]check serum FARHANA, smooth muscle Ab, anti-LKM-1 Ab, alpha-1 antitrypsin, ferritin, ceruloplasmin, copper, immunoglobulin panel (IgG, IgA, IgM)    # Hep C weakly reactive, but Hep C RNA viral load negative    #R/o portal vein thrombosis  triple phase CT A/P to r/o portal vein thrombosis --- negative --> Hep gtt discontinued    #abd distension  +BS, soft, sl tender to palpation  still with BMs  Abd XR  If leukocytosis continues to worsen or pressor requirements go up will consider CT A/P      #Anuria  See ID section    Renal  # Hepatorenal syndrome, unlikely to have renal recovery as pt remains anuric  - Suspect acute renal failure 2/2 HRS in s/o alcoholic hepatitis and cirrhosis   - s/p octreotride gtt  - s/p albumin   - On CRRT, continue through weekend, plan for iHD on Monday 9/6  - Baseline Cr 2.5  - Monitor Cr and avoid nephrotoxic agents  - Renally dose meds   - MAP goal 65  - Monitor I/Os   [ ] bmp q6h while on CRRT    #Hyperkalemia  - patient noted to be hyperkalemic to 5.5- 5.7 overnight  - s/p 1 dose of lokelma  - will c/w temporizing measures  - Shiley replaced overnight, c/w CRRT    # Hyponatremia - 114 on presentation, now resolved.  - Euvolemic on exam; possibly 2/2 ARF and or poor oral intake in s/o ETOH use   - Angela 20, suggestive of decreased PO intake    ID  CXR clear lungs  Leukocytosis rising while on ceftriaxone, unclear cause.    #E.coli bacteremia likely 2/2 E.coli UTI  -CTX 8/30 - 9/2  - zosyn 9/2 - 9/4  - CTX 9/4 - 9/6  - Vanc/Froilan: 9/6 - Current  E. coli in UTI -->100k E.coli CFU, sensitive to CTX  E. coli  in blood culture  sensitive to CTX  - F/u repeat BCx sent on 9/5    #SBP PPx   - Minimal ascites seen on POCUS 9/1 -- nothing to tap  - Discussed with GI attending, no need to consult IR right now as nothing to tap  - Previously on CTX, abx escalated to Vanc/Froilan    Heme  # Macrocytic anemia & Thrombocytopenia   - Likely in s/o poor nutritional status from ETOH use  - B12 normal, folate iron studies  - Folate thiamine supplement  - s/p total 4U PRBC transfusion this admission (8/30, 9/2, 9/5, 9/6)    # Coagulopathy   - In s/o decompensated alcoholic cirrhosis and ARF   - S/p DDAVP for likely plt dysfunction   - cont to trend daily PT INR PTT   - INR worsening overall    Ethics  # FULL code   -  is surrogate decision maker   -  Michele updated 9/6 at bedside  - will c/w GOC conversations

## 2021-09-07 NOTE — PROGRESS NOTE ADULT - ATTENDING COMMENTS
# JULIANA/ ATN - Etiology of JULIANA secondary to ATN from bilirubin cast nephropathy or hepatorenal syndrome.  No signs of renal recovery yet. Patient had a deterioration of condition overnight. Now back on 3 vasopressors. Not hemodynamically stable for intermittent HD. We will resume CRRT today.     # Hyponatremia - multifactorial (from volume overload). Now resolved.    # anemia - Tsat 75%. Blood transfusion per primary team.     # Metabolic acidosis - secondary to uremia and lactic acidemia. Continue CRRT.     The rest of the recommendations as per fellow's note.    D/w MICU team.     Gracie Ho MD  Attending Nephrologist  [phone]

## 2021-09-07 NOTE — PROGRESS NOTE ADULT - ATTENDING COMMENTS
- alc cirrhosis  - ascites  - encephalopathy  - UTI sepsis, shock in ICU on three pressors and midodrine 20 tid, not intubated  - severe JULIANA on CVVH  - sedated with dexmedetomidine  - melena, Hb drop - GI bleed    - poor prognosis  - can offer EGD tomorrow

## 2021-09-07 NOTE — PROGRESS NOTE ADULT - REASON FOR ADMISSION
CRRT

## 2021-09-07 NOTE — PROGRESS NOTE ADULT - ATTENDING SUPERVISION STATEMENT
Fellow
Resident
Fellow
Resident
Fellow

## 2021-09-07 NOTE — PROGRESS NOTE ADULT - PROBLEM SELECTOR PLAN 4
Pt. with metabolic acidosis in the setting of uremia and JULIANA. SCO is low at 15 today. Continue CRRT.            If you have any questions, please feel free to contact me  Angeline Perrin  Nephrology Fellow  Pager NS: [phone]/ [facility]: [phone]    (After 5 pm or on weekends please page the on-call fellow, can check [email or web address] for schedule. Login is vvii dye, schedule under [facility] medicine, psych, derm)
Pt. with metabolic acidosis in the setting of uremia and JULIANA. SCO is low at 16 today. Continue CRRT.    If you have any questions, please feel free to contact me  Beto Rosenthal  Nephrology Fellow  [phone]  (After 5pm or on weekends please page the on-call fellow)
Pt. with metabolic acidosis in the setting of uremia and JULIANA. SCO is low at 14 today. Continue CRRT.    If you have any questions, please feel free to contact me  Beto Rosenthal  Nephrology Fellow  [phone]  (After 5pm or on weekends please page the on-call fellow).
Pt. with metabolic acidosis in the setting of uremia and JULIANA. SCO is low at 17 today. Continue CRRT.            If you have any questions, please feel free to contact me  Angeline Perrin  Nephrology Fellow  Pager NS: [phone]/ [facility]: [phone]    (After 5 pm or on weekends please page the on-call fellow, can check [email or web address] for schedule. Login is vivi dye, schedule under [facility] medicine, psych, derm)
Pt. with metabolic acidosis in the setting of uremia and JULIANA. SCO is low at 19 today. Continue CRRT.    If you have any questions, please feel free to contact me  Beto Rosenthal  Nephrology Fellow  [phone]  (After 5pm or on weekends please page the on-call fellow).

## 2021-09-07 NOTE — PROCEDURE NOTE - NSPOSTPRCRAD_GEN_A_CORE
central line located in the superior vena cava
post-procedure radiography performed
central line located in the/central line located in the superior vena cava/depth of insertion/line adjusted to depth of insertion/no pneumothorax/post procedure radiography not performed
post-procedure radiography performed
post-procedure radiography performed

## 2021-09-07 NOTE — PROGRESS NOTE ADULT - PROBLEM SELECTOR PLAN 3
Pt. with anemia of unclear etiology. Iron studies done on 8/29/21 showed increased % sat is elevated to 75%, TIBC is low at 158. HB is 6.7 today. Received 2 units PRBC (8/30/21 and then 9/2/21). Consider doing GI work up to rule out any bleeding. Monitor H&H. Blood transfusion as primary team.
Pt. with anemia of unclear etiology. Iron studies done on 8/29/21 showed increased % sat is elevated to 75%, TIBC is low at 158. HB is 7.5 today. Received 2 units PRBC (8/30/21 and then 9/2/21). Consider doing GI work up to rule out any bleeding. Monitor H&H. Blood transfusion as primary team.
Pt. with anemia of unclear etiology. Iron studies done on 8/29/21 showed increased % sat is elevated to 75%, TIBC is low at 158. HB is 7.7 today. Received 2 units PRBC (8/30/21 and then 9/2/21). Consider doing GI work up to rule out any bleeding. Monitor H&H. Blood transfusion as primary team.
Pt. with anemia of unclear etiology. Iron studies done on 8/29/21 showed increased % sat is elevated to 75%, TIBC is low at 158. Received 2 units PRBC (8/30/21 and then 9/2/21).  Hb was 6.9 and required 1 unit. Hb is stable at 8.5 today. Monitor H&H. Blood transfusion as primary team.
Pt. with anemia of unclear etiology. Iron studies done on 8/29/21 showed increased % sat is elevated to 75%, TIBC is low at 158. Received 2 units PRBC (8/30/21 and then 9/2/21).  Hb was 6.9 and required 1 unit. Monitor H&H. Blood transfusion as primary team.

## 2021-09-07 NOTE — PROCEDURE NOTE - NSPROCDETAILS_GEN_ALL_CORE
guidewire recovered/lumen(s) aspirated and flushed/sterile dressing applied/sterile technique, catheter placed/ultrasound guidance with use of sterile gel and probe cove
location identified, draped/prepped, sterile technique used, needle inserted/introduced/positive blood return obtained via catheter/connected to a pressurized flush line/sutured in place/hemostasis with direct pressure, dressing applied/all materials/supplies accounted for at end of procedure
nasogastric
guidewire recovered/lumen(s) aspirated and flushed/sterile dressing applied/sterile technique, catheter placed/ultrasound guidance with use of sterile gel and probe cove

## 2021-09-07 NOTE — PROCEDURE NOTE - NSINFORMCONSENT_GEN_A_CORE
This was an emergent procedure.
Benefits, risks, and possible complications of procedure explained to patient/caregiver who verbalized understanding and gave verbal consent.
This was an emergent procedure.
This was an emergent procedure.

## 2021-09-07 NOTE — PROGRESS NOTE ADULT - PROBLEM SELECTOR PLAN 2
Pt with JULIANA n the setting of severe liver disease. Exact duration of JULIANA however unknown. Upon review of [facility] HIE/[employer]  Scr was 0.89 in 03/2019. Upon admission to OSH Scr was 16 (8/28/21) with . She was s/p Octreotide/ albumin. 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 Started on CRRT on 8/29/21. Goal is to maintain net even balance. Monitor labs and urine output. Avoid NSAIDs, ACEI/ARBS, RCA and nephrotoxins. Dose medications as per CRRT.    If you have any questions, please feel free to contact me  Beto Rosenthal  Nephrology Fellow  [phone]  (After 5pm or on weekends please page the on-call fellow).
Pt with JULIANA n the setting of severe liver disease. Exact duration of JULIANA however unknown. Upon review of [facility] HIE/[employer]  Scr was 0.89 in 03/2019. Upon admission to OSH Scr was 16 (8/28/21) with . She was s/p Octreotide/ albumin. 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 Started on CRRT on 8/29/21. Goal is to maintain net even balance. Labs reviewed this morning. Will continue CRRT. Monitor labs and urine output. Avoid NSAIDs, ACEI/ARBS, RCA and nephrotoxins. Dose medications as per CRRT.
Pt with JULIANA n the setting of severe liver disease. Exact duration of JULIANA however unknown. Upon review of [facility] HIE/[employer]  Scr was 0.89 in 03/2019. Upon admission to OSH Scr was 16 (8/28/21) with . She was s/p Octreotide/ albumin. 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 Started on CRRT on 8/29/21. Goal is to maintain net even balance. Labs reviewed this morning. CRRT filter clotted overnight.  Hb was 6.9 and required 1 unit. Unable to add heparin to circuit due to thrombocytopenia. Will continue CRRT. Monitor labs and urine output. Avoid NSAIDs, ACEI/ARBS, RCA and nephrotoxins. Dose medications as per CRRT.
Pt with JULIANA n the setting of severe liver disease. Exact duration of JULIANA however unknown. Upon review of [facility] HIE/[employer]  Scr was 0.89 in 03/2019. Upon admission to OSH Scr was 16 (8/28/21) with . She was s/p Octreotide/ albumin. 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 Started on CRRT on 8/29/21. Goal is to maintain net even balance. Labs reviewed layne SUE. SNa is low at 134 today. Will continue CRRT. Monitor labs and urine output. Avoid NSAIDs, ACEI/ARBS, RCA and nephrotoxins. Dose medications as per CRRT.
Pt with JULIANA n the setting of severe liver disease. Exact duration of JULIANA however unknown. Upon review of [facility] HIE/[employer]  Scr was 0.89 in 03/2019. Upon admission to OSH Scr was 16 (8/28/21) with . She was s/p Octreotide/ albumin. 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 Started on CRRT on 8/29/21. Goal is to maintain net even balance. Labs reviewed this morning. CRRT filter clotted overnight. Unable to add heparin to circuit due to thrombocytopenia. Will continue CRRT. Monitor labs and urine output. Avoid NSAIDs, ACEI/ARBS, RCA and nephrotoxins. Dose medications as per CRRT.
Pt with JULIANA n the setting of severe liver disease. Exact duration of JULIANA however unknown. Upon review of [facility] HIE/[employer]  Scr was 0.89 in 03/2019. Upon admission to OSH Scr was 16 (8/28/21) with . She was s/p Octreotide/ albumin. 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 Started on CRRT on 8/29/21. Goal is to maintain net even balance. Labs reviewed this morning. Will continue CRRT. Monitor labs and urine output. Avoid NSAIDs, ACEI/ARBS, RCA and nephrotoxins. Dose medications as per CRRT.

## 2021-09-07 NOTE — PROGRESS NOTE ADULT - SUBJECTIVE AND OBJECTIVE BOX
Authored By:  Zeina Humphrey MD  PGY-3, Internal Medicine    INTERVAL HPI/OVERNIGHT EVENTS: [facility] TLC and JOSH reid replaced. Patient noted with melena overnight, started on protonix gtt. Potassium remaining elevated to 6s, given additional lokelma, D50 and insulin and also placed back on CRRT.    SUBJECTIVE: Patient seen and examined at bedside this morning.     OBJECTIVE:    VITAL SIGNS:  ICU Vital Signs Last 24 Hrs  T(C): 37.6 (07 Sep 2021 04:00), Max: 38.2 (06 Sep 2021 12:00)  T(F): 99.7 (07 Sep 2021 04:00), Max: 100.8 (06 Sep 2021 12:00)  HR: 121 (07 Sep 2021 07:40) (103 - 126)  BP: 86/47 (07 Sep 2021 02:30) (73/39 - 119/53)  BP(mean): 61 (07 Sep 2021 02:30) (51 - 80)  ABP: 112/53 (07 Sep 2021 07:40) (75/50 - 129/61)  ABP(mean): 75 (07 Sep 2021 07:40) (61 - 84)  RR: 22 (07 Sep 2021 07:40) (15 - 35)  SpO2: 100% (07 Sep 2021 07:40) (68% - 100%)    09-06 @ 07:01  -  09-07 @ 07:00  --------------------------------------------------------  IN: 4017.1 mL / OUT: 311 mL / NET: 3706.1 mL    CAPILLARY BLOOD GLUCOSE    POCT Blood Glucose.: 257 mg/dL (06 Sep 2021 16:33)    PHYSICAL EXAM:  GENERAL: Lethargic, Jaundiced  HEENT: scleral icterus  CHEST/LUNG: Clear to auscultation bilaterally; No crackles, rhonchi, wheezing, or rubs  HEART: Regular rate and rhythm; No murmurs, rubs, or gallops  ABDOMEN: Soft, +diffusely tender to palpation, +distended; Bowel sounds present  EXTREMITIES:  no edema  SKIN: No rashes or lesions  NERVOUS SYSTEM: aaox0 lethargic, states "yes/no" to questions  LINES: PANTERA Barone central line        MEDICATIONS:  MEDICATIONS  (STANDING):  chlorhexidine 4% Liquid 1 Application(s) Topical <User Schedule>  chlorhexidine 4% Liquid 1 Application(s) Topical <User Schedule>  CRRT Treatment    <Continuous>  dexMEDEtomidine Infusion 0.2 MICROgram(s)/kG/Hr (4.05 mL/Hr) IV Continuous <Continuous>  dextrose 40% Gel 15 Gram(s) Oral once  dextrose 50% Injectable 25 Gram(s) IV Push once  dextrose 50% Injectable 12.5 Gram(s) IV Push once  dextrose 50% Injectable 25 Gram(s) IV Push once  folic acid 1 milliGRAM(s) Oral daily  glucagon  Injectable 1 milliGRAM(s) IntraMuscular once  insulin lispro (ADMELOG) corrective regimen sliding scale   SubCutaneous every 6 hours  lactulose Syrup 20 Gram(s) Oral every 24 hours  meropenem  IVPB 500 milliGRAM(s) IV Intermittent every 12 hours  meropenem  IVPB      midodrine. 20 milliGRAM(s) Oral every 8 hours  norepinephrine Infusion 0.05 MICROgram(s)/kG/Min (3.8 mL/Hr) IV Continuous <Continuous>  pantoprazole Infusion 8 mG/Hr (10 mL/Hr) IV Continuous <Continuous>  phenylephrine    Infusion 1 MICROgram(s)/kG/Min (15.2 mL/Hr) IV Continuous <Continuous>  PrismaSOL Filtration BGK 0 / 2.5 5000 milliLiter(s) (1000 mL/Hr) CRRT <Continuous>  PrismaSOL Filtration BGK 4 / 2.5 5000 milliLiter(s) (800 mL/Hr) CRRT <Continuous>  PrismaSOL Filtration BGK 4 / 2.5 5000 milliLiter(s) (200 mL/Hr) CRRT <Continuous>  QUEtiapine 25 milliGRAM(s) Oral every 12 hours  rifAXIMin 550 milliGRAM(s) Oral two times a day  simethicone 80 milliGRAM(s) Chew two times a day  thiamine 100 milliGRAM(s) Oral daily  ursodiol Tablet 500 milliGRAM(s) Oral every 12 hours  vasopressin Infusion 0.04 Unit(s)/Min (2.4 mL/Hr) IV Continuous <Continuous>    MEDICATIONS  (PRN):  sodium chloride 0.9% lock flush 10 milliLiter(s) IV Push every 1 hour PRN Pre/post blood products, medications, blood draw, and to maintain line patency    ALLERGIES:  Allergies    No Known Allergies    Intolerances    LABS:                        7.1    31.25 )-----------( 129      ( 07 Sep 2021 05:37 )             21.4     Hemoglobin: 7.1 g/dL (09-07 @ 05:37)  Hemoglobin: 7.5 g/dL (09-06 @ 23:43)  Hemoglobin: 7.3 g/dL (09-06 @ 18:28)  Hemoglobin: 6.8 g/dL (09-06 @ 11:37)  Hemoglobin: 7.3 g/dL (09-05 @ 23:45)    CBC Full  -  ( 07 Sep 2021 05:37 )  WBC Count : 31.25 K/uL  RBC Count : 2.05 M/uL  Hemoglobin : 7.1 g/dL  Hematocrit : 21.4 %  Platelet Count - Automated : 129 K/uL  Mean Cell Volume : 104.4 fl  Mean Cell Hemoglobin : 34.6 pg  Mean Cell Hemoglobin Concentration : 33.2 gm/dL  Auto Neutrophil # : x  Auto Lymphocyte # : x  Auto Monocyte # : x  Auto Eosinophil # : x  Auto Basophil # : x  Auto Neutrophil % : x  Auto Lymphocyte % : x  Auto Monocyte % : x  Auto Eosinophil % : x  Auto Basophil % : x    09-07    136  |  102  |  91<H>  ----------------------------<  235<H>  6.0<H>   |  <10<LL>  |  2.80<H>    Ca    9.1      07 Sep 2021 05:37  Phos  6.4     09-07  Mg     2.7     09-07    TPro  5.4<L>  /  Alb  3.0<L>  /  TBili  21.4<H>  /  DBili  x   /  AST  90<H>  /  ALT  64<H>  /  AlkPhos  185<H>  09-06    Creatinine Trend: 2.80<--, 2.80<--, 2.95<--, 2.52<--, 2.22<--, 1.76<--  LIVER FUNCTIONS - ( 06 Sep 2021 23:43 )  Alb: 3.0 g/dL / Pro: 5.4 g/dL / ALK PHOS: 185 U/L / ALT: 64 U/L / AST: 90 U/L / GGT: x           PT/INR - ( 05 Sep 2021 23:45 )   PT: 17.1 sec;   INR: 1.45 ratio      PTT - ( 05 Sep 2021 23:45 )  PTT:30.5 sec    hs Troponin:    ABG - ( 07 Sep 2021 07:32 )  pH, Arterial: 7.41  pH, Blood: x     /  pCO2: 25    /  pO2: 96    / HCO3: 16    / Base Excess: -8.0  /  SaO2: 99.5      07:32 - ABG - pH: 7.41  |  pCO2: 25    |  pO2: 96    | Lactate:       | BE: -8.0   05:36 - ABG - pH: 7.34  |  pCO2: 20    |  pO2: 88    | Lactate:       | BE: -13.5  23:45 - VBG - pH: 7.33  | pCO2: 31    | pO2: 44    | Lactate: 3.7    19:06 - VBG - pH: 7.29  | pCO2: 32    | pO2: 44    | Lactate:        17:21 - VBG - pH: 7.31  | pCO2: 31    | pO2: 45    | Lactate:        11:28 - VBG - pH: 7.36  | pCO2: 29    | pO2: 47    | Lactate: 4.4      CSF:    EKG:   MICROBIOLOGY:    Culture - Blood (collected 05 Sep 2021 14:12)  Source: .Blood Blood-Peripheral  Preliminary Report (06 Sep 2021 15:01):    No growth to date.    Culture - Blood (collected 05 Sep 2021 14:12)  Source: .Blood Blood-Venous  Preliminary Report (06 Sep 2021 15:01):    No growth to date.    IMAGING:    Labs, imaging, EKG personally reviewed    RADIOLOGY & ADDITIONAL TESTS: Reviewed. Authored By:  Zeina Humphrey MD  PGY-3, Internal Medicine    INTERVAL HPI/OVERNIGHT EVENTS: [facility] TLC and JOSH reid replaced. Patient noted with melena overnight, started on protonix gtt. Potassium remaining elevated to 6s, given additional lokelma, D50 and insulin and also placed back on CRRT.    SUBJECTIVE: Patient seen and examined at bedside this morning. Able to state her name, location and year. Answers "no" when asked if she has any pain. Otherwise unable to elaborate further secondary to confusion.    OBJECTIVE:    VITAL SIGNS:  ICU Vital Signs Last 24 Hrs  T(C): 37.6 (07 Sep 2021 04:00), Max: 38.2 (06 Sep 2021 12:00)  T(F): 99.7 (07 Sep 2021 04:00), Max: 100.8 (06 Sep 2021 12:00)  HR: 121 (07 Sep 2021 07:40) (103 - 126)  BP: 86/47 (07 Sep 2021 02:30) (73/39 - 119/53)  BP(mean): 61 (07 Sep 2021 02:30) (51 - 80)  ABP: 112/53 (07 Sep 2021 07:40) (75/50 - 129/61)  ABP(mean): 75 (07 Sep 2021 07:40) (61 - 84)  RR: 22 (07 Sep 2021 07:40) (15 - 35)  SpO2: 100% (07 Sep 2021 07:40) (68% - 100%)    09-06 @ 07:01  -  09-07 @ 07:00  --------------------------------------------------------  IN: 4017.1 mL / OUT: 311 mL / NET: 3706.1 mL    CAPILLARY BLOOD GLUCOSE    POCT Blood Glucose.: 257 mg/dL (06 Sep 2021 16:33)    PHYSICAL EXAM:  GENERAL: Lethargic, Jaundiced  HEENT: scleral icterus  CHEST/LUNG: Clear to auscultation bilaterally; No crackles, rhonchi, wheezing, or rubs  HEART: Regular rate and rhythm; No murmurs, rubs, or gallops  ABDOMEN: Soft, +diffusely tender to palpation, +distended; Bowel sounds present  EXTREMITIES:  no edema  SKIN: No rashes or lesions  NERVOUS SYSTEM: aaox0 lethargic, states "yes/no" to questions  LINES: PANTERA Barone central line        MEDICATIONS:  MEDICATIONS  (STANDING):  chlorhexidine 4% Liquid 1 Application(s) Topical <User Schedule>  chlorhexidine 4% Liquid 1 Application(s) Topical <User Schedule>  CRRT Treatment    <Continuous>  dexMEDEtomidine Infusion 0.2 MICROgram(s)/kG/Hr (4.05 mL/Hr) IV Continuous <Continuous>  dextrose 40% Gel 15 Gram(s) Oral once  dextrose 50% Injectable 25 Gram(s) IV Push once  dextrose 50% Injectable 12.5 Gram(s) IV Push once  dextrose 50% Injectable 25 Gram(s) IV Push once  folic acid 1 milliGRAM(s) Oral daily  glucagon  Injectable 1 milliGRAM(s) IntraMuscular once  insulin lispro (ADMELOG) corrective regimen sliding scale   SubCutaneous every 6 hours  lactulose Syrup 20 Gram(s) Oral every 24 hours  meropenem  IVPB 500 milliGRAM(s) IV Intermittent every 12 hours  meropenem  IVPB      midodrine. 20 milliGRAM(s) Oral every 8 hours  norepinephrine Infusion 0.05 MICROgram(s)/kG/Min (3.8 mL/Hr) IV Continuous <Continuous>  pantoprazole Infusion 8 mG/Hr (10 mL/Hr) IV Continuous <Continuous>  phenylephrine    Infusion 1 MICROgram(s)/kG/Min (15.2 mL/Hr) IV Continuous <Continuous>  PrismaSOL Filtration BGK 0 / 2.5 5000 milliLiter(s) (1000 mL/Hr) CRRT <Continuous>  PrismaSOL Filtration BGK 4 / 2.5 5000 milliLiter(s) (800 mL/Hr) CRRT <Continuous>  PrismaSOL Filtration BGK 4 / 2.5 5000 milliLiter(s) (200 mL/Hr) CRRT <Continuous>  QUEtiapine 25 milliGRAM(s) Oral every 12 hours  rifAXIMin 550 milliGRAM(s) Oral two times a day  simethicone 80 milliGRAM(s) Chew two times a day  thiamine 100 milliGRAM(s) Oral daily  ursodiol Tablet 500 milliGRAM(s) Oral every 12 hours  vasopressin Infusion 0.04 Unit(s)/Min (2.4 mL/Hr) IV Continuous <Continuous>    MEDICATIONS  (PRN):  sodium chloride 0.9% lock flush 10 milliLiter(s) IV Push every 1 hour PRN Pre/post blood products, medications, blood draw, and to maintain line patency    ALLERGIES:  Allergies    No Known Allergies    Intolerances    LABS:                        7.1    31.25 )-----------( 129      ( 07 Sep 2021 05:37 )             21.4     Hemoglobin: 7.1 g/dL (09-07 @ 05:37)  Hemoglobin: 7.5 g/dL (09-06 @ 23:43)  Hemoglobin: 7.3 g/dL (09-06 @ 18:28)  Hemoglobin: 6.8 g/dL (09-06 @ 11:37)  Hemoglobin: 7.3 g/dL (09-05 @ 23:45)    CBC Full  -  ( 07 Sep 2021 05:37 )  WBC Count : 31.25 K/uL  RBC Count : 2.05 M/uL  Hemoglobin : 7.1 g/dL  Hematocrit : 21.4 %  Platelet Count - Automated : 129 K/uL  Mean Cell Volume : 104.4 fl  Mean Cell Hemoglobin : 34.6 pg  Mean Cell Hemoglobin Concentration : 33.2 gm/dL  Auto Neutrophil # : x  Auto Lymphocyte # : x  Auto Monocyte # : x  Auto Eosinophil # : x  Auto Basophil # : x  Auto Neutrophil % : x  Auto Lymphocyte % : x  Auto Monocyte % : x  Auto Eosinophil % : x  Auto Basophil % : x    09-07    136  |  102  |  91<H>  ----------------------------<  235<H>  6.0<H>   |  <10<LL>  |  2.80<H>    Ca    9.1      07 Sep 2021 05:37  Phos  6.4     09-07  Mg     2.7     09-07    TPro  5.4<L>  /  Alb  3.0<L>  /  TBili  21.4<H>  /  DBili  x   /  AST  90<H>  /  ALT  64<H>  /  AlkPhos  185<H>  09-06    Creatinine Trend: 2.80<--, 2.80<--, 2.95<--, 2.52<--, 2.22<--, 1.76<--  LIVER FUNCTIONS - ( 06 Sep 2021 23:43 )  Alb: 3.0 g/dL / Pro: 5.4 g/dL / ALK PHOS: 185 U/L / ALT: 64 U/L / AST: 90 U/L / GGT: x           PT/INR - ( 05 Sep 2021 23:45 )   PT: 17.1 sec;   INR: 1.45 ratio      PTT - ( 05 Sep 2021 23:45 )  PTT:30.5 sec    hs Troponin:    ABG - ( 07 Sep 2021 07:32 )  pH, Arterial: 7.41  pH, Blood: x     /  pCO2: 25    /  pO2: 96    / HCO3: 16    / Base Excess: -8.0  /  SaO2: 99.5      07:32 - ABG - pH: 7.41  |  pCO2: 25    |  pO2: 96    | Lactate:       | BE: -8.0   05:36 - ABG - pH: 7.34  |  pCO2: 20    |  pO2: 88    | Lactate:       | BE: -13.5  23:45 - VBG - pH: 7.33  | pCO2: 31    | pO2: 44    | Lactate: 3.7    19:06 - VBG - pH: 7.29  | pCO2: 32    | pO2: 44    | Lactate:        17:21 - VBG - pH: 7.31  | pCO2: 31    | pO2: 45    | Lactate:        11:28 - VBG - pH: 7.36  | pCO2: 29    | pO2: 47    | Lactate: 4.4      CSF:    EKG:   MICROBIOLOGY:    Culture - Blood (collected 05 Sep 2021 14:12)  Source: .Blood Blood-Peripheral  Preliminary Report (06 Sep 2021 15:01):    No growth to date.    Culture - Blood (collected 05 Sep 2021 14:12)  Source: .Blood Blood-Venous  Preliminary Report (06 Sep 2021 15:01):    No growth to date.    IMAGING:    Labs, imaging, EKG personally reviewed    RADIOLOGY & ADDITIONAL TESTS: Reviewed.

## 2021-09-07 NOTE — PROCEDURE NOTE - NSINDICATIONS_GEN_A_CORE
dialysis/CRRT
critical illness/emergency venous access/venous access
critical patient/monitoring purposes
critical illness
dialysis/CRRT
feeds

## 2021-09-07 NOTE — PROCEDURE NOTE - NSPOSTCAREGUIDE_GEN_A_CORE
Future appt:     Your appointments     Date & Time Appointment Department [facility])    Apr 10, 2018  1:30 PM CDT Presurgical with Rony Ken MD [address] [facility]        [place] [address],
Verbal/written post procedure instructions were given to patient/caregiver/Instructed patient/caregiver to follow-up with primary care physician/Instructed patient/caregiver regarding signs and symptoms of infection/Keep the cast/splint/dressing clean and dry/Care for catheter as per unit/ICU protocols
Care for catheter as per unit/ICU protocols

## 2021-09-07 NOTE — PROGRESS NOTE ADULT - SUBJECTIVE AND OBJECTIVE BOX
Chief Complaint:  Patient is a 56y old  Female who presents with a chief complaint of CRRT (07 Sep 2021 07:44)      Interval Events: Febrile overnight with Tmax 38.2. Yesterday started on phenylephrine and vasopressin. Yesterday Hgb dropped to 6.8 s/p 1 u pRBC with Hgb 7.1 this AM. Pending another unit pRBC this AM. Patient awake but confused. Per MICU nurse, was having dark stools yesterday.      Hospital Medications:  chlorhexidine 4% Liquid 1 Application(s) Topical <User Schedule>  chlorhexidine 4% Liquid 1 Application(s) Topical <User Schedule>  CRRT Treatment    <Continuous>  dexMEDEtomidine Infusion 0.2 MICROgram(s)/kG/Hr IV Continuous <Continuous>  dextrose 40% Gel 15 Gram(s) Oral once  dextrose 50% Injectable 25 Gram(s) IV Push once  dextrose 50% Injectable 12.5 Gram(s) IV Push once  dextrose 50% Injectable 25 Gram(s) IV Push once  folic acid 1 milliGRAM(s) Oral daily  glucagon  Injectable 1 milliGRAM(s) IntraMuscular once  insulin lispro (ADMELOG) corrective regimen sliding scale   SubCutaneous every 6 hours  lactulose Syrup 20 Gram(s) Oral every 24 hours  meropenem  IVPB 500 milliGRAM(s) IV Intermittent every 12 hours  meropenem  IVPB      midodrine. 20 milliGRAM(s) Oral every 8 hours  norepinephrine Infusion 0.05 MICROgram(s)/kG/Min IV Continuous <Continuous>  pantoprazole Infusion 8 mG/Hr IV Continuous <Continuous>  phenylephrine    Infusion 1 MICROgram(s)/kG/Min IV Continuous <Continuous>  PrismaSOL Filtration BGK 0 / 2.5 5000 milliLiter(s) CRRT <Continuous>  PrismaSOL Filtration BGK 4 / 2.5 5000 milliLiter(s) CRRT <Continuous>  PrismaSOL Filtration BGK 4 / 2.5 5000 milliLiter(s) CRRT <Continuous>  QUEtiapine 25 milliGRAM(s) Oral every 12 hours  rifAXIMin 550 milliGRAM(s) Oral two times a day  simethicone 80 milliGRAM(s) Chew two times a day  sodium chloride 0.9% lock flush 10 milliLiter(s) IV Push every 1 hour PRN  thiamine 100 milliGRAM(s) Oral daily  ursodiol Tablet 500 milliGRAM(s) Oral every 12 hours  vasopressin Infusion 0.04 Unit(s)/Min IV Continuous <Continuous>      PMHX/PSHX:  Anxiety    GERD (gastroesophageal reflux disease)    Constipation    Fatty liver    IBS (irritable bowel syndrome)    Liver cirrhosis    HLD (hyperlipidemia)    Claustrophobia    ETOH abuse    Lipoma    H/O colonoscopy    H/O endoscopy    Lipoma of back            ROS: 14 point ROS negative unless otherwise stated in subjective      PHYSICAL EXAM:     GENERAL:  +chronically ill appearing in mild-moderate distress  HEENT:  NC/AT,  +scleral icterus  CHEST:  Full & symmetric excursion, no increased effort w/ respirations  HEART:  +tachycardic, regular rhythm  ABDOMEN:  +obese, soft, non-tender, mildly-distended  EXTREMITIES:  1+ BL LE edema  SKIN:  +jaundice  NEURO:  Awake, orientedx1 (not oriented to place or time); unable to assess asterixis as pt was in restraints    Vital Signs:  Vital Signs Last 24 Hrs  T(C): 37.7 (07 Sep 2021 08:00), Max: 38.2 (06 Sep 2021 12:00)  T(F): 99.9 (07 Sep 2021 08:00), Max: 100.8 (06 Sep 2021 12:00)  HR: 115 (07 Sep 2021 10:45) (103 - 126)  BP: 86/47 (07 Sep 2021 02:30) (73/39 - 119/53)  BP(mean): 61 (07 Sep 2021 02:30) (51 - 80)  RR: 16 (07 Sep 2021 10:45) (15 - 35)  SpO2: 100% (07 Sep 2021 10:45) (68% - 100%)  Daily     Daily Weight in k.3 (07 Sep 2021 07:00)    LABS:                        8.5    29.80 )-----------( 125      ( 07 Sep 2021 10:07 )             25.1     09-07    142  |  102  |  97<H>  ----------------------------<  259<H>  5.2   |  14<L>  |  2.94<H>    Ca    8.8      07 Sep 2021 07:36  Phos  6.4     09-  Mg     2.7     09-07    TPro  5.4<L>  /  Alb  3.0<L>  /  TBili  21.4<H>  /  DBili  x   /  AST  90<H>  /  ALT  64<H>  /  AlkPhos  185<H>      LIVER FUNCTIONS - ( 06 Sep 2021 23:43 )  Alb: 3.0 g/dL / Pro: 5.4 g/dL / ALK PHOS: 185 U/L / ALT: 64 U/L / AST: 90 U/L / GGT: x           PT/INR - ( 05 Sep 2021 23:45 )   PT: 17.1 sec;   INR: 1.45 ratio         PTT - ( 05 Sep 2021 23:45 )  PTT:30.5 sec        Imaging: No new abdominal imaging

## 2021-09-08 NOTE — CHART NOTE - NSCHARTNOTESELECT_GEN_ALL_CORE
ISTOP/Event Note
MICU POCUS
MICU US
Death Note/Event Note
GOC Discussion/Event Note
POCUS/Event Note

## 2021-09-08 NOTE — DISCHARGE NOTE FOR THE EXPIRED PATIENT - HOSPITAL COURSE
57 yo f pmhx ETOH abuse (last drink ~2 weeks ago), ETOH cirrhosis with ascites, splenomegaly, HLD, IBS, anxiety presented from home after being sent in by her GI Dr. López for abnl labs.  Per patient over the last week she has noticed her sclera/skin to be bright orange, fatigued, lightheaded, nausea, poor appetite and abdominal bloating.  Patient went for labs as an outpatient 8/27, which were significant for elevated BUN/Cr and bili.  In [facility] ED, patient found to be grossly jaundiced, labs significant for Na 114, BUN/Cr 146/16.3, serum co2 14, tbili 27.1, ast/alt 205/113.  Patient given solumedrol, pantoprazole, PO potassium. Patient seen by nephrology, started on NS for the hyponatremia. Patient admitted to critical care service for higher level of care.     While in [facility] ICU, pt was treated for alcoholic hepatitis, hepatorenal syndrome, and acute renal failure. Pt was seen by nephrology, who recommended initiation of CVVH, which will require transfer to tertiary center. Hyponatremia was treated with NS and patient also received octreotide and albumin for HRS. Pt was also maintained on NaHCO3 drip for metabolic acidosis. Given hypervolemic state, albumin and further IVF and drips were discontinued. Pt was started on 32mg IV solumedrol qD for alcoholic hepatitis. Lactulose was titrated to 20mg TID. Spironolactone was not given due to ARF in s/o HRS. GI was consulted for alcoholic hepatitis as well as possible liver transplant eval, which also agreed to transfer to tertiary center for further evaluation. An ICU to ICU transfer was initiated and pt was transferred to [facility] MICU for further management.     While in the MICU the patient was treated for hepatic encephalopathy as well as hepatorenal syndrome and received CRRT, placed on pressors and given PRBCs as platelets and hbg continued to downtrend. Her course was complicated by UTI for which she was treated for 7 days of Zosyn. Her pressor requirements continued to rise and she was increased to triple pressors requiring levophed, phenylephrine and vasopressin. Despite Lokelma, her K+ remained elevated and she was TTP L>R despite being nondistended and nonperitoneal. Meropenem was started. On 9/7 her  made decision to make her DNR/DNI with comfort measures and she was taken off of pressors. She was placed on ativan, glycopyrrolate, and dilaudid and was subsequently more comfortable. Aystole noted on monitor; patient seen and evaluated and was without spontaneous breathing; pupils fixed and dilated with no response to light, no heart/lung sounds, no corneal reflex. Patient declared dead based on cardiopulmonary criteria at 2:58AM.

## 2021-09-08 NOTE — CHART NOTE - NSCHARTNOTEFT_GEN_A_CORE
Asystole noted on monitor. Patient seen and examined. No spontaneous breathing observed, no carotid pulses present b/l, pupils fixed and dilated, unresponsive to light; absent corneal reflex, no response to painful stimuli, absent heart/lung sounds. Patient pronounced dead at 2:58AM.

## 2021-09-09 LAB
COPPER SERPL-MCNC: 108 UG/DL — SIGNIFICANT CHANGE UP (ref 80–158)
HEV IGM SER QL: SIGNIFICANT CHANGE UP

## 2021-09-10 LAB
CULTURE RESULTS: SIGNIFICANT CHANGE UP
CULTURE RESULTS: SIGNIFICANT CHANGE UP
SPECIMEN SOURCE: SIGNIFICANT CHANGE UP
SPECIMEN SOURCE: SIGNIFICANT CHANGE UP

## 2021-09-21 NOTE — PATIENT PROFILE ADULT - ARRIVAL FROM
Impression: Dry eye syndrome of bilateral lacrimal glands: H04.123. Plan: For dry eye syndrome, I have recommended patient use a good quality artificial tear 4-6 times per day. Recommend plugs BLL, patient would like to proceed, consent signed. Home

## 2024-06-21 NOTE — ED PROVIDER NOTE - CPE EDP CARDIAC NORM
Physical Therapy    Patient not seen in therapy.     Unavailable due to request no therapy.      Attempted to see patient for treatment session, re assessment transfers/gait.  Patient seen supine in bed. Patient c/o pain BLE and stated \"Oh no. I want to walk but I can't. Not with this pain. I am waiting to get something for pain that will work. Last night the thony horse on the arch of my right foot pop out and I screamed in pain. I am even afraid to sleep right now. It hurts to even try to move my legs and they are sensitive. I am sorry.\"  Patient rates BLE more RLE right foot as 8/10 at rest.    Nurse aware of PT attempt and patient's c/o and concerns.  Visit Type: treatment     OBJECTIVE                         Therapy procedure time and total treatment time can be found documented on the Time Entry flowsheet   normal...

## 2024-10-16 NOTE — ASU PATIENT PROFILE, ADULT - PROVIDER NOTIFICATION
Pt OK for d/c home per provider. All results and findings discussed with patient by provider. Home care and follow up instructions provided to patient. All questions answered. Pt verbalized understanding of all information. Pt A&Ox4, resp easy, skin warm dry and of appropriate color. Departs ED with steady gait.      Jaquelin Villegas RN  10/16/24 [id number]    
Declines